# Patient Record
Sex: MALE | Race: WHITE | NOT HISPANIC OR LATINO
[De-identification: names, ages, dates, MRNs, and addresses within clinical notes are randomized per-mention and may not be internally consistent; named-entity substitution may affect disease eponyms.]

---

## 2022-09-03 ENCOUNTER — NON-APPOINTMENT (OUTPATIENT)
Age: 62
End: 2022-09-03

## 2022-09-04 ENCOUNTER — INPATIENT (INPATIENT)
Facility: HOSPITAL | Age: 62
LOS: 4 days | Discharge: HOME | End: 2022-09-09
Attending: INTERNAL MEDICINE | Admitting: INTERNAL MEDICINE

## 2022-09-04 VITALS
SYSTOLIC BLOOD PRESSURE: 168 MMHG | DIASTOLIC BLOOD PRESSURE: 112 MMHG | RESPIRATION RATE: 18 BRPM | TEMPERATURE: 100 F | OXYGEN SATURATION: 96 % | HEART RATE: 84 BPM

## 2022-09-04 DIAGNOSIS — I16.0 HYPERTENSIVE URGENCY: ICD-10-CM

## 2022-09-04 DIAGNOSIS — N17.9 ACUTE KIDNEY FAILURE, UNSPECIFIED: ICD-10-CM

## 2022-09-04 DIAGNOSIS — I10 ESSENTIAL (PRIMARY) HYPERTENSION: ICD-10-CM

## 2022-09-04 DIAGNOSIS — F41.9 ANXIETY DISORDER, UNSPECIFIED: ICD-10-CM

## 2022-09-04 DIAGNOSIS — U07.1 COVID-19: ICD-10-CM

## 2022-09-04 DIAGNOSIS — Z29.9 ENCOUNTER FOR PROPHYLACTIC MEASURES, UNSPECIFIED: ICD-10-CM

## 2022-09-04 LAB
ALBUMIN SERPL ELPH-MCNC: 4.1 G/DL — SIGNIFICANT CHANGE UP (ref 3.3–5)
ALP SERPL-CCNC: 80 U/L — SIGNIFICANT CHANGE UP (ref 40–120)
ALT FLD-CCNC: 22 U/L — SIGNIFICANT CHANGE UP (ref 4–41)
ANION GAP SERPL CALC-SCNC: 12 MMOL/L — SIGNIFICANT CHANGE UP (ref 7–14)
APTT BLD: 31.8 SEC — SIGNIFICANT CHANGE UP (ref 27–36.3)
AST SERPL-CCNC: 25 U/L — SIGNIFICANT CHANGE UP (ref 4–40)
BASE EXCESS BLDV CALC-SCNC: 1 MMOL/L — SIGNIFICANT CHANGE UP (ref -2–3)
BASOPHILS # BLD AUTO: 0.02 K/UL — SIGNIFICANT CHANGE UP (ref 0–0.2)
BASOPHILS NFR BLD AUTO: 0.4 % — SIGNIFICANT CHANGE UP (ref 0–2)
BILIRUB SERPL-MCNC: 0.5 MG/DL — SIGNIFICANT CHANGE UP (ref 0.2–1.2)
BLOOD GAS VENOUS COMPREHENSIVE RESULT: SIGNIFICANT CHANGE UP
BUN SERPL-MCNC: 15 MG/DL — SIGNIFICANT CHANGE UP (ref 7–23)
CALCIUM SERPL-MCNC: 8.7 MG/DL — SIGNIFICANT CHANGE UP (ref 8.4–10.5)
CHLORIDE BLDV-SCNC: 102 MMOL/L — SIGNIFICANT CHANGE UP (ref 96–108)
CHLORIDE SERPL-SCNC: 104 MMOL/L — SIGNIFICANT CHANGE UP (ref 98–107)
CO2 BLDV-SCNC: 28 MMOL/L — HIGH (ref 22–26)
CO2 SERPL-SCNC: 23 MMOL/L — SIGNIFICANT CHANGE UP (ref 22–31)
CREAT SERPL-MCNC: 1.43 MG/DL — HIGH (ref 0.5–1.3)
EGFR: 56 ML/MIN/1.73M2 — LOW
EOSINOPHIL # BLD AUTO: 0.01 K/UL — SIGNIFICANT CHANGE UP (ref 0–0.5)
EOSINOPHIL NFR BLD AUTO: 0.2 % — SIGNIFICANT CHANGE UP (ref 0–6)
FLUAV AG NPH QL: SIGNIFICANT CHANGE UP
FLUBV AG NPH QL: SIGNIFICANT CHANGE UP
GAS PNL BLDV: 134 MMOL/L — LOW (ref 136–145)
GLUCOSE BLDV-MCNC: 95 MG/DL — SIGNIFICANT CHANGE UP (ref 70–99)
GLUCOSE SERPL-MCNC: 94 MG/DL — SIGNIFICANT CHANGE UP (ref 70–99)
HCO3 BLDV-SCNC: 27 MMOL/L — SIGNIFICANT CHANGE UP (ref 22–29)
HCT VFR BLD CALC: 40.2 % — SIGNIFICANT CHANGE UP (ref 39–50)
HCT VFR BLDA CALC: 40 % — SIGNIFICANT CHANGE UP (ref 39–51)
HGB BLD CALC-MCNC: 13.2 G/DL — SIGNIFICANT CHANGE UP (ref 13–17)
HGB BLD-MCNC: 13.7 G/DL — SIGNIFICANT CHANGE UP (ref 13–17)
IANC: 3.84 K/UL — SIGNIFICANT CHANGE UP (ref 1.8–7.4)
IMM GRANULOCYTES NFR BLD AUTO: 0.4 % — SIGNIFICANT CHANGE UP (ref 0–1.5)
INR BLD: 1.13 RATIO — SIGNIFICANT CHANGE UP (ref 0.88–1.16)
LACTATE BLDV-MCNC: 1.4 MMOL/L — SIGNIFICANT CHANGE UP (ref 0.5–2)
LYMPHOCYTES # BLD AUTO: 0.8 K/UL — LOW (ref 1–3.3)
LYMPHOCYTES # BLD AUTO: 14.5 % — SIGNIFICANT CHANGE UP (ref 13–44)
MAGNESIUM SERPL-MCNC: 2.1 MG/DL — SIGNIFICANT CHANGE UP (ref 1.6–2.6)
MCHC RBC-ENTMCNC: 28.7 PG — SIGNIFICANT CHANGE UP (ref 27–34)
MCHC RBC-ENTMCNC: 34.1 GM/DL — SIGNIFICANT CHANGE UP (ref 32–36)
MCV RBC AUTO: 84.1 FL — SIGNIFICANT CHANGE UP (ref 80–100)
MONOCYTES # BLD AUTO: 0.81 K/UL — SIGNIFICANT CHANGE UP (ref 0–0.9)
MONOCYTES NFR BLD AUTO: 14.7 % — HIGH (ref 2–14)
NEUTROPHILS # BLD AUTO: 3.84 K/UL — SIGNIFICANT CHANGE UP (ref 1.8–7.4)
NEUTROPHILS NFR BLD AUTO: 69.8 % — SIGNIFICANT CHANGE UP (ref 43–77)
NRBC # BLD: 0 /100 WBCS — SIGNIFICANT CHANGE UP (ref 0–0)
NRBC # FLD: 0 K/UL — SIGNIFICANT CHANGE UP (ref 0–0)
NT-PROBNP SERPL-SCNC: 2540 PG/ML — HIGH
PCO2 BLDV: 45 MMHG — SIGNIFICANT CHANGE UP (ref 42–55)
PH BLDV: 7.38 — SIGNIFICANT CHANGE UP (ref 7.32–7.43)
PLATELET # BLD AUTO: 167 K/UL — SIGNIFICANT CHANGE UP (ref 150–400)
PO2 BLDV: 46 MMHG — SIGNIFICANT CHANGE UP
POTASSIUM BLDV-SCNC: 3.7 MMOL/L — SIGNIFICANT CHANGE UP (ref 3.5–5.1)
POTASSIUM SERPL-MCNC: 3.6 MMOL/L — SIGNIFICANT CHANGE UP (ref 3.5–5.3)
POTASSIUM SERPL-SCNC: 3.6 MMOL/L — SIGNIFICANT CHANGE UP (ref 3.5–5.3)
PROT SERPL-MCNC: 6.8 G/DL — SIGNIFICANT CHANGE UP (ref 6–8.3)
PROTHROM AB SERPL-ACNC: 13.1 SEC — SIGNIFICANT CHANGE UP (ref 10.5–13.4)
RBC # BLD: 4.78 M/UL — SIGNIFICANT CHANGE UP (ref 4.2–5.8)
RBC # FLD: 13.7 % — SIGNIFICANT CHANGE UP (ref 10.3–14.5)
RSV RNA NPH QL NAA+NON-PROBE: SIGNIFICANT CHANGE UP
SAO2 % BLDV: 80 % — SIGNIFICANT CHANGE UP
SARS-COV-2 RNA SPEC QL NAA+PROBE: DETECTED
SODIUM SERPL-SCNC: 139 MMOL/L — SIGNIFICANT CHANGE UP (ref 135–145)
TROPONIN T, HIGH SENSITIVITY RESULT: 20 NG/L — SIGNIFICANT CHANGE UP
TROPONIN T, HIGH SENSITIVITY RESULT: 21 NG/L — SIGNIFICANT CHANGE UP
WBC # BLD: 5.5 K/UL — SIGNIFICANT CHANGE UP (ref 3.8–10.5)
WBC # FLD AUTO: 5.5 K/UL — SIGNIFICANT CHANGE UP (ref 3.8–10.5)

## 2022-09-04 PROCEDURE — 71046 X-RAY EXAM CHEST 2 VIEWS: CPT | Mod: 26

## 2022-09-04 PROCEDURE — 71275 CT ANGIOGRAPHY CHEST: CPT | Mod: 26,MA

## 2022-09-04 PROCEDURE — 99285 EMERGENCY DEPT VISIT HI MDM: CPT

## 2022-09-04 PROCEDURE — 99223 1ST HOSP IP/OBS HIGH 75: CPT

## 2022-09-04 PROCEDURE — 74174 CTA ABD&PLVS W/CONTRAST: CPT | Mod: 26,MD

## 2022-09-04 RX ORDER — HYDRALAZINE HCL 50 MG
25 TABLET ORAL ONCE
Refills: 0 | Status: COMPLETED | OUTPATIENT
Start: 2022-09-04 | End: 2022-09-04

## 2022-09-04 RX ORDER — ACETAMINOPHEN 500 MG
650 TABLET ORAL EVERY 6 HOURS
Refills: 0 | Status: DISCONTINUED | OUTPATIENT
Start: 2022-09-04 | End: 2022-09-09

## 2022-09-04 RX ORDER — AMLODIPINE BESYLATE 2.5 MG/1
5 TABLET ORAL DAILY
Refills: 0 | Status: DISCONTINUED | OUTPATIENT
Start: 2022-09-05 | End: 2022-09-07

## 2022-09-04 RX ORDER — ALBUTEROL 90 UG/1
2 AEROSOL, METERED ORAL EVERY 4 HOURS
Refills: 0 | Status: DISCONTINUED | OUTPATIENT
Start: 2022-09-04 | End: 2022-09-09

## 2022-09-04 RX ORDER — HEPARIN SODIUM 5000 [USP'U]/ML
5000 INJECTION INTRAVENOUS; SUBCUTANEOUS EVERY 8 HOURS
Refills: 0 | Status: DISCONTINUED | OUTPATIENT
Start: 2022-09-04 | End: 2022-09-09

## 2022-09-04 RX ORDER — HYDRALAZINE HCL 50 MG
25 TABLET ORAL EVERY 8 HOURS
Refills: 0 | Status: DISCONTINUED | OUTPATIENT
Start: 2022-09-05 | End: 2022-09-06

## 2022-09-04 RX ORDER — ASPIRIN/CALCIUM CARB/MAGNESIUM 324 MG
324 TABLET ORAL ONCE
Refills: 0 | Status: COMPLETED | OUTPATIENT
Start: 2022-09-04 | End: 2022-09-04

## 2022-09-04 RX ORDER — AMLODIPINE BESYLATE 2.5 MG/1
5 TABLET ORAL ONCE
Refills: 0 | Status: COMPLETED | OUTPATIENT
Start: 2022-09-04 | End: 2022-09-04

## 2022-09-04 RX ADMIN — Medication 25 MILLIGRAM(S): at 23:31

## 2022-09-04 RX ADMIN — Medication 25 MILLIGRAM(S): at 18:51

## 2022-09-04 RX ADMIN — AMLODIPINE BESYLATE 5 MILLIGRAM(S): 2.5 TABLET ORAL at 18:51

## 2022-09-04 RX ADMIN — Medication 324 MILLIGRAM(S): at 18:51

## 2022-09-04 RX ADMIN — Medication 1 MILLIGRAM(S): at 17:21

## 2022-09-04 NOTE — ED ADULT TRIAGE NOTE - NS ED NURSE AMBULANCES
[facility]

## 2022-09-04 NOTE — ED ADULT NURSE NOTE - OBJECTIVE STATEMENT
Receive pt. in ER room 26 alert and oriented x 4, presenting to the ER with complaints of right shoulder and back of neck pain, accompanied by high blood pressure. Pt. stated " for one week the back of my neck and my right shoulder has been hurting me on and off, I went to [facility] and they told me my blood pressure is high, I have not taken my blood pressure medications in two years". No c/o chest pain, dizziness or blurry vision. Pt. verbalized feeling anxious, lonely , and depressed. Stated " my ex- wife does not allow me to see my son and it hurt me very much'. No suicidal or homicidal ideations. Place on cardiac monitor, medicated as ordered, labs sent. Call bell place within reach. Receive pt. in ER room 26 alert and oriented x 4, presenting to the ER with complaints of right shoulder and back of neck pain, accompanied by high blood pressure. Pt. stated " for one week the back of my neck and my right shoulder has been hurting me on and off, I went to [facility] and they told me my blood pressure is high, I have not taken my blood pressure medications in two years". No c/o chest pain, dizziness or blurry vision. Pt. verbalized feeling anxious, lonely , and depressed. Stated " my ex- wife does not allow me to see my son and it hurt me very much'. No suicidal or homicidal ideations. Place on cardiac monitor, medicated as ordered, labs sent. Call bell place within reach. Receive pt. in ER room 26 alert and oriented x 4, presenting to the ER with complaints of right shoulder and back of neck pain, accompanied by high blood pressure. Pt. stated " for one week the back of my neck and my right shoulder has been hurting me on and off, I went to [facility] and they told me my blood pressure is high, I have not taken my blood pressure medications in two years". No c/o chest pain, dizziness or blurry vision. Pt. verbalized feeling anxious, lonely , and depressed. Stated " my ex- wife does not allow me to see my son and it hurt me very much'. No suicidal or homicidal ideations. Place on cardiac monitor, medicated as ordered, labs sent. Call bell place within reach.

## 2022-09-04 NOTE — H&P ADULT - PROBLEM SELECTOR PLAN 1
urgency vs emergency, pt presented 190s/110s. Neck pain w/ radiation to L arm has resolved at this time. No chest pain reported  -Pt does have vague ekg tw changes, no prior to compare to, likely from longstanding untreated htn  -cards consulted, rec po hydralazine 25mg tid and norvasc 5mg daily for now, aiming for around 160s sysotlic for the next day  -hydral and amlodipine ordered with hold parameters <150 for now, will need adjustment as days go on.  -trops flat, CTA w/ no dissection. watch on tele. check echo

## 2022-09-04 NOTE — ED ADULT NURSE REASSESSMENT NOTE - NS ED NURSE REASSESS COMMENT FT1
pt received A&ox4, denies chest pain and SOB. no complaints of pain. denies headache, dizziness ,lightheadedness, radiating chest pain. breathing is spontaneous and unlabored. continuos  pulse ox monitoring in place. bed in lowest position, call bell within reach, siderails up x2. will continue to monitor.
Pt a&ox4, NSR on cardiac monitor, denies CP, SOB, or dyspnea at this time. Respirations are even and unlabored, no signs of respiratory distress.

## 2022-09-04 NOTE — ED PROVIDER NOTE - PHYSICAL EXAMINATION
LOS:     VITALS:   T(C): 37.6 (09-04-22 @ 15:43), Max: 37.6 (09-04-22 @ 15:43)  HR: 84 (09-04-22 @ 15:43) (84 - 84)  BP: 168/112 (09-04-22 @ 15:43) (168/112 - 168/112)  RR: 18 (09-04-22 @ 15:43) (18 - 18)  SpO2: 96% (09-04-22 @ 15:43) (96% - 96%)    GENERAL: NAD, lying in bed comfortably  HEAD:  Atraumatic, Normocephalic  EYES: EOMI, PERRLA, conjunctiva and sclera clear  ENT: Moist mucous membranes  NECK: Supple, No JVD  CHEST/LUNG: Clear to auscultation bilaterally; No rales, rhonchi, wheezing, or rubs. Unlabored respirations  HEART: Regular rate and rhythm; No murmurs, rubs, or gallops  ABDOMEN: BSx4; Soft, nontender, nondistended  EXTREMITIES:  2+ Peripheral Pulses, brisk capillary refill. No clubbing, cyanosis, or edema  NERVOUS SYSTEM:  A&Ox3, no focal deficits   SKIN: No rashes or lesions  Psych: Anxious

## 2022-09-04 NOTE — ED PROVIDER NOTE - CLINICAL SUMMARY MEDICAL DECISION MAKING FREE TEXT BOX
61yo non-compliant w/ BP medication p/w neck pain radiating to the R.arm found to have elevated BP. Plan to get cardiac labs to r/o ACS 63yo non-compliant w/ BP medication p/w neck pain radiating to the R.arm found to have elevated BP. Plan to get cardiac labs to r/o ACS

## 2022-09-04 NOTE — ED ADULT NURSE NOTE - NSIMPLEMENTINTERV_GEN_ALL_ED
Implemented All Universal Safety Interventions:  [place] to call system. Call bell, personal items and telephone within reach. Instruct patient to call for assistance. Room bathroom lighting operational. Non-slip footwear when patient is off stretcher. Physically safe environment: no spills, clutter or unnecessary equipment. Stretcher in lowest position, wheels locked, appropriate side rails in place. Implemented All Universal Safety Interventions:  [place] to call system. Call bell, personal items and telephone within reach. Instruct patient to call for assistance. Room bathroom lighting operational. Non-slip footwear when patient is off stretcher. Physically safe environment: no spills, clutter or unnecessary equipment. Stretcher in lowest position, wheels locked, appropriate side rails in place. Implemented All Universal Safety Interventions:  [place] to call system. Call bell, personal items and telephone within reach. Instruct patient to call for assistance. Room bathroom lighting operational. Non-slip footwear when patient is off stretcher. Physically safe environment: no spills, clutter or unnecessary equipment. Stretcher in lowest position, wheels locked, appropriate side rails in place.

## 2022-09-04 NOTE — ED ADULT NURSE NOTE - CHIEF COMPLAINT QUOTE
[facility] elevated [facility].  r shoulder,r arm pain x past wk. denies at present. denies specific injury. denies ch pains.  denies headache. reports " feels nervous ,depressed lately, I have anxiety but don't see a psychiatrist anymore because I cannot afford it ,I feel alone ,I do not want to kill myself or anyone else"

## 2022-09-04 NOTE — H&P ADULT - ASSESSMENT
61 y/o male with pmhx of HTN and anxiety (on no medications) who presents to the ER for elevated blood pressure in the setting of 5 days of feeling fatigue and vague neck pain radiating down his back and R arm. Found in the ER COVID positive and hypertensive, admitted for further workup.  63 y/o male with pmhx of HTN and anxiety (on no medications) who presents to the ER for elevated blood pressure in the setting of 5 days of feeling fatigue and vague neck pain radiating down his back and R arm. Found in the ER COVID positive and hypertensive, admitted for further workup.

## 2022-09-04 NOTE — H&P ADULT - NSHPLABSRESULTS_GEN_ALL_CORE
09-04    139  |  104  |  15  ----------------------------<  94  3.6   |  23  |  1.43<H>    Ca    8.7      04 Sep 2022 17:31  Mg     2.10     09-04    TPro  6.8  /  Alb  4.1  /  TBili  0.5  /  DBili  x   /  AST  25  /  ALT  22  /  AlkPhos  80  09-04                            13.7   5.50  )-----------( 167      ( 04 Sep 2022 17:31 )             40.2             LIVER FUNCTIONS - ( 04 Sep 2022 17:31 )  Alb: 4.1 g/dL / Pro: 6.8 g/dL / ALK PHOS: 80 U/L / ALT: 22 U/L / AST: 25 U/L / GGT: x             PT/INR - ( 04 Sep 2022 17:31 )   PT: 13.1 sec;   INR: 1.13 ratio         PTT - ( 04 Sep 2022 17:31 )  PTT:31.8 sec    EKG: sinus rhythm w/ PACs, nonspecific tw inversions T4-T6

## 2022-09-04 NOTE — H&P ADULT - HISTORY OF PRESENT ILLNESS
Pt is a 63 y/o male with pmhx of HTN and anxiety (on no medications) who presents to the ER for elevated blood pressure in the setting of 5 days of feeling fatigue and vague neck pain radiating down his back and R arm. HE reports that it might have started after he lifted a heavy trashbag. No weak or tingling sensation and no worsening on exertion. He denies any chest pain nor shortness of breath and denies any cardiac history. He does endorse mild cough and post nasal drip, otherwise no headache nor fever. No abd pain, diarrhea or dysuria. He has been having a good appetite. He does endorse feeling more anxious and slightly depressed recently due to being alone but denies any suicidal ideation. He reports he was on meds in the past but not recently. In the Er pt was found hypertensive and given amlodipine and hydralazine. Also given a dose of ativan for anxiety.

## 2022-09-04 NOTE — ED PROVIDER NOTE - PROGRESS NOTE DETAILS
[place]aney PGY-2: Pt BP stable at 160s/100s. Will plan to get CTA C/A/P to r/o aortic dissection. Will start patient on BP medication. Dissection study negative, patient clinically stable. Tele doc contacted w update - will admit to Dr. German. Cornelia Dias, ED Attending

## 2022-09-04 NOTE — H&P ADULT - PROBLEM SELECTOR PLAN 3
no SI. Dealing with issues at home and loneliness per pt. Continue ativan po prn for now. will need outpatient psych follow up vs inpatient eval if pt still admitted

## 2022-09-04 NOTE — ED ADULT TRIAGE NOTE - CHIEF COMPLAINT QUOTE
from [facility] elevated bp.  r shoulder,r arm pain x past wk. denies specific injury. denies ch pains.  denies headache. reports " feels nervous ,depressed lately, I have anxiety but don't see a psychiatrist anymore because I cannot afford it ,i feel alone ,i do not want to kill myself or anyone else" from [facility] elevated bp.  r shoulder,r arm pain x past wk. denies at present. denies specific injury. denies ch pains.  denies headache. reports " feels nervous ,depressed lately, I have anxiety but don't see a psychiatrist anymore because I cannot afford it ,I feel alone ,I do not want to kill myself or anyone else"

## 2022-09-04 NOTE — H&P ADULT - PROBLEM SELECTOR PLAN 2
possibly 2/2 long standing htn. No prior value to compare to. Check urine studies for now. monitor with blood pressure improvement. check i/o

## 2022-09-04 NOTE — ED PROVIDER NOTE - OBJECTIVE STATEMENT
63yo M w/ PMH of HTN and anxiety presents from [facility] for elevated blood pressure. Pt states that he started having neck pain that was radiating to down to the spine and to the right arm. He states that the pain was worse when he was sitting down in the car. Pt currently under a lot of stress and feeling very depressed and alone. States that he is feeling very fatigue for the past week. Pt states that he has not taken any BP medication or anxiety medication for over 2 years. Denies any chest pain, SOB, N/V, abdominal pain, headache, blurry vision, weakness, SI/HI. 63yo M w/ PMH of HTN and anxiety presents from [facility] for elevated blood pressure. Pt states that he started having neck pain that was radiating to down to the spine and to the right arm. He states that the pain was worse when he was sitting down in the car. Pt currently under a lot of stress and feeling very depressed and alone. States that he is feeling very fatigue for the past week. Pt states that he has not taken any BP medication or anxiety medication for over 2 years. Denies any chest pain, SOB, N/V, abdominal pain, headache, blurry vision, weakness, SI/HI. 63yo M w/ PMH of HTN and anxiety presents from [facility] for elevated blood pressure. Pt states that he started having neck pain that was radiating to down to the spine and to the right arm. He states that the pain was worse when he was sitting down in the car. Pt currently under a lot of stress and feeling very depressed and alone. States that he is feeling very fatigue for the past week. Pt states that he has not taken any BP medication or anxiety medication for over 2 years. Denies any chest pain, SOB, N/V, abdominal pain, headache, blurry vision, weakness, SI/HI.

## 2022-09-04 NOTE — H&P ADULT - NSHPREVIEWOFSYSTEMS_GEN_ALL_CORE
REVIEW OF SYSTEMS:    CONSTITUTIONAL: +generalieed weakness, no fevers or chills  EYES/ENT: +post nasal gtt. No visual changes;  No dysphagia  NECK: +mild neck pain, no stiffness  RESPIRATORY: No cough, wheezing, hemoptysis; No shortness of breath  CARDIOVASCULAR: No chest pain or palpitations; No lower extremity edema  GASTROINTESTINAL: No abdominal or epigastric pain. No nausea, vomiting, or hematemesis; No diarrhea or constipation. No melena or hematochezia.  GENITOURINARY: No dysuria, frequency or hematuria  NEUROLOGICAL: No numbness or weakness  PSYCH: +anxiety and depression  SKIN: No itching, burning, rashes, or lesions

## 2022-09-04 NOTE — CHART NOTE - NSCHARTNOTEFT_GEN_A_CORE
On call for [facility]/  Joey Bagley MD    case discussed w/ ED for telemetry doc of the day admission.  CC chest pain, radiating into neck and arm.    history of noncompliance / non-action with antihypertensives for ~2 yrs.  referred from urgent care for elevated bp... 190s/110s.  labs returned with borderline troponin x 1.  EKG reported as T-wave inversions in multiple leads.    Prior to accepting an admission, I've requested CTA for rule-out aortic dissection, which would be dangerous on the floor.  Also, start oral antihypertensives. i.e. Hydralazine 25mg TID, and amlodipine 5mg daily.  Goal BP for the next day or so is ~160mmHg systolic.  I've requested avoiding IV bolus antihypertensives to get his #s good enough for floor admission... if he requires a titratable infusion for BP control, he needs to go to the intensive care setting for ~12-24hrs to get medications loaded.  On admission, would get an echocardiogram, and trend troponin levels.    Will discuss outcome of studies and oral antihypertensive Rx with the ED later tonmarian, and we can discuss how to best admit the patient then.    Jean Jo M.D.  Cardiac Electrophysiology  [phone] On call for [facility]/  Joey Bagley MD    case discussed w/ ED for telemetry doc of the day admission.  CC chest pain, radiating into neck and arm.    history of noncompliance / non-action with antihypertensives for ~2 yrs.  referred from urgent care for elevated bp... 190s/110s.  labs returned with borderline troponin x 1.  EKG reported as T-wave inversions in multiple leads.    Prior to accepting an admission, I've requested CTA for rule-out aortic dissection, which would be dangerous on the floor.  Also, start oral antihypertensives. i.e. Hydralazine 25mg TID, and amlodipine 5mg daily.  Goal BP for the next day or so is ~160mmHg systolic.  I've requested avoiding IV bolus antihypertensives to get his #s good enough for floor admission... if he requires a titratable infusion for BP control, he needs to go to the intensive care setting for ~12-24hrs to get medications loaded.  On admission, would get an echocardiogram, and trend troponin levels.    Will discuss outcome of studies and oral antihypertensive Rx with the ED later tonmarian, and we can discuss how to best admit the patient then.    Jean Jo M.D.  Cardiac Electrophysiology  [phone] On call for [facility]/  Joey Bagley MD    case discussed w/ ED for telemetry doc of the day admission.  CC chest pain, radiating into neck and arm.    history of noncompliance / non-action with antihypertensives for ~2 yrs.  referred from urgent care for elevated bp... 190s/110s.  labs returned with borderline troponin x 1.  EKG reported as T-wave inversions in multiple leads.    Prior to accepting an admission, I've requested CTA for rule-out aortic dissection, which would be dangerous on the floor.  Also, start oral antihypertensives. i.e. Hydralazine 25mg TID, and amlodipine 5mg daily.  Goal BP for the next day or so is ~160mmHg systolic.  I've requested avoiding IV bolus antihypertensives to get his #s good enough for floor admission... if he requires a titratable infusion for BP control, he needs to go to the intensive care setting for ~12-24hrs to get medications loaded.  On admission, would get an echocardiogram, and trend troponin levels.    Will discuss outcome of studies and oral antihypertensive Rx with the ED later tonmarian, and we can discuss how to best admit the patient then.    Jean Jo M.D.  Cardiac Electrophysiology  [phone]

## 2022-09-04 NOTE — ED PROVIDER NOTE - ATTENDING APP SHARED VISIT CONTRIBUTION OF CARE
I have personally performed a face to face medical and diagnostic evaluation of the patient. I have discussed with and reviewed the ACP's note and agree with the History, ROS, Physical Exam and MDM unless otherwise indicated. A brief summary of my personal evaluation and impression can be found below.     63yo M w/ PMH of HTN and anxiety presents from [facility] for elevated blood pressure. Pt states that he started having neck pain that was radiating to down to the spine and to the right arm - pain is worse when he is in a seated . He states that the pain was worse when he was sitting down in the car. Pt currently under a lot of stress and feeling very depressed and alone. States that he is feeling very fatigue for the past week. Pt states that he has not taken any BP medication or anxiety medication for over 2 years. Denies any chest pain, SOB, N/V, abdominal pain, headache, blurry vision, weakness, SI/HI. I have personally performed a face to face medical and diagnostic evaluation of the patient. I have discussed with and reviewed the ACP's note and agree with the History, ROS, Physical Exam and MDM unless otherwise indicated. A brief summary of my personal evaluation and impression can be found below.     61yo M w/ PMH of HTN and anxiety presents from [facility] for elevated blood pressure. Pt states that he started having neck pain that was radiating to down to the spine and to the right arm - pain is worse when he is in a seated . He states that the pain was worse when he was sitting down in the car. Pt currently under a lot of stress and feeling very depressed and alone. States that he is feeling very fatigue for the past week. Pt states that he has not taken any BP medication or anxiety medication for over 2 years. Denies any chest pain, SOB, N/V, abdominal pain, headache, blurry vision, weakness, SI/HI. I have personally performed a face to face medical and diagnostic evaluation of the patient. I have discussed with and reviewed the ACP's note and agree with the History, ROS, Physical Exam and MDM unless otherwise indicated. A brief summary of my personal evaluation and impression can be found below.     61yo M w/ PMH of HTN and anxiety presents from [facility] for elevated blood pressure. Pt states that he started having neck pain that was radiating to down to the spine and to the right arm - pain is worse when he is in a seated . He states that the pain was worse when he was sitting down in the car. Pt currently under a lot of stress and feeling very depressed and alone. States that he is feeling very fatigue for the past week. Pt states that he has not taken any BP medication or anxiety medication for over 2 years. Denies any chest pain, SOB, N/V, abdominal pain, headache, blurry vision, weakness, SI/HI. I have personally performed a face to face medical and diagnostic evaluation of the patient. I have discussed with and reviewed the ACP's note and agree with the History, ROS, Physical Exam and MDM unless otherwise indicated. A brief summary of my personal evaluation and impression can be found below.     61yo M w/ PMH of HTN and anxiety presents from [facility] for elevated blood pressure. Pt states that he started having neck pain that was radiating to down to the spine and to the right arm - pain is worse when he is in a seated. Pt currently under a lot of stress and feeling very depressed and alone - but denies SI/HI. States that he is feeling very fatigue for the past week. Pt states that he has not taken any BP medication or anxiety medication for over 2 years. Denies any chest pain, SOB, N/V, abdominal pain, headache, blurry vision, weakness.     On exam, VSS w clear lungs, no LE edema. EKG w non specific ekg changes - t wave inversions in the precordial leads. Given fatigue, uncontrolled long standing HTN, and ekg changes, will do ACS work up. Pt endorses anxiety - will trial PO ativan to help with symptoms. Will need tele admission for completion of work. Cornelia Dias, ED Attending I have personally performed a face to face medical and diagnostic evaluation of the patient. I have discussed with and reviewed the ACP's note and agree with the History, ROS, Physical Exam and MDM unless otherwise indicated. A brief summary of my personal evaluation and impression can be found below.     61yo M w/ PMH of HTN and anxiety presents from [facility] for elevated blood pressure. Pt states that he started having neck pain that was radiating to down to the spine and to the right arm - pain is worse when he is in a seated. Pt currently under a lot of stress and feeling very depressed and alone - but denies SI/HI. States that he is feeling very fatigue for the past week. Pt states that he has not taken any BP medication or anxiety medication for over 2 years. Denies any chest pain, SOB, N/V, abdominal pain, headache, blurry vision, weakness.     On exam, VSS w clear lungs, no LE edema. EKG w non specific ekg changes - t wave inversions in the precordial leads. Given fatigue, uncontrolled long standing HTN, and ekg changes, will do ACS work up. Pt endorses anxiety - will trial PO ativan to help with symptoms. Will need tele admission for completion of work. Cornelia Dias, ED Attending I have personally performed a face to face medical and diagnostic evaluation of the patient. I have discussed with and reviewed the ACP's note and agree with the History, ROS, Physical Exam and MDM unless otherwise indicated. A brief summary of my personal evaluation and impression can be found below.     61yo M w/ PMH of HTN and anxiety presents from [facility] for elevated blood pressure. Pt states that he started having neck pain that was radiating to down to the spine and to the right arm - pain is worse when he is in a seated. Pt currently under a lot of stress and feeling very depressed and alone - but denies SI/HI. States that he is feeling very fatigue for the past week. Pt states that he has not taken any BP medication or anxiety medication for over 2 years. Denies any chest pain, SOB, N/V, abdominal pain, headache, blurry vision, weakness.     On exam, VSS w clear lungs, no LE edema. EKG w non specific ekg changes - t wave inversions in the precordial leads. Given fatigue, uncontrolled long standing HTN, and ekg changes, will do ACS work up. Pt endorses anxiety - will trial PO ativan to help with symptoms. Will need tele admission for completion of work. Cornelia Dias, ED Attending

## 2022-09-04 NOTE — H&P ADULT - NSHPPHYSICALEXAM_GEN_ALL_CORE
PHYSICAL EXAM:  GENERAL: NAD, well-developed, well-nourished  HEAD:  Atraumatic, Normocephalic  EYES: EOMI, PERRLA, conjunctiva and sclera clear  NECK: Supple, nontender to palpation, +full ROM  CHEST/LUNG: Clear to auscultation bilaterally; No wheezes, rales or rhonchi  HEART: Regular rate and rhythm; No murmurs, rubs, or gallops, (+)S1, S2  ABDOMEN: Soft, Nontender, Nondistended; Normal Bowel sounds   EXTREMITIES:  2+ Peripheral Pulses, No clubbing, cyanosis, or edema  PSYCH: normal mood and affect  NEUROLOGY: AAOx3, non-focal  SKIN: No rashes or lesions

## 2022-09-05 PROCEDURE — 93306 TTE W/DOPPLER COMPLETE: CPT | Mod: 26

## 2022-09-05 RX ORDER — HYDRALAZINE HCL 50 MG
2.5 TABLET ORAL ONCE
Refills: 0 | Status: COMPLETED | OUTPATIENT
Start: 2022-09-05 | End: 2022-09-05

## 2022-09-05 RX ORDER — GUAIFENESIN/DEXTROMETHORPHAN 600MG-30MG
5 TABLET, EXTENDED RELEASE 12 HR ORAL ONCE
Refills: 0 | Status: COMPLETED | OUTPATIENT
Start: 2022-09-05 | End: 2022-09-05

## 2022-09-05 RX ADMIN — HEPARIN SODIUM 5000 UNIT(S): 5000 INJECTION INTRAVENOUS; SUBCUTANEOUS at 21:29

## 2022-09-05 RX ADMIN — Medication 25 MILLIGRAM(S): at 21:29

## 2022-09-05 RX ADMIN — Medication 25 MILLIGRAM(S): at 14:52

## 2022-09-05 RX ADMIN — HEPARIN SODIUM 5000 UNIT(S): 5000 INJECTION INTRAVENOUS; SUBCUTANEOUS at 14:52

## 2022-09-05 RX ADMIN — Medication 2.5 MILLIGRAM(S): at 01:27

## 2022-09-05 RX ADMIN — Medication 5 MILLILITER(S): at 01:55

## 2022-09-05 RX ADMIN — HEPARIN SODIUM 5000 UNIT(S): 5000 INJECTION INTRAVENOUS; SUBCUTANEOUS at 04:08

## 2022-09-05 RX ADMIN — Medication 0.5 MILLIGRAM(S): at 04:07

## 2022-09-05 RX ADMIN — AMLODIPINE BESYLATE 5 MILLIGRAM(S): 2.5 TABLET ORAL at 04:07

## 2022-09-05 RX ADMIN — Medication 25 MILLIGRAM(S): at 04:07

## 2022-09-05 NOTE — CONSULT NOTE ADULT - SUBJECTIVE AND OBJECTIVE BOX
date of consult 9/5/22    HISTORY OF PRESENT ILLNESS: HPI:  Pt is a 63 y/o male with pmhx of HTN and anxiety (on no medications) who presents to the ER for elevated blood pressure in the setting of 5 days of feeling fatigue and neck pain radiating down his back and R arm. HE reports that it might have started after he lifted a heavy trashbag. No weak or tingling sensation and no worsening on exertion. He denies any chest pain nor shortness of breath and denies any cardiac history. He does endorse mild cough and post nasal drip, otherwise no headache nor fever. No abd pain, diarrhea or dysuria. He has been having a good appetite. He does endorse feeling more anxious and slightly depressed recently due to being alone but denies any suicidal ideation. He reports he was on meds in the past but not recently. In the Er pt was found hypertensive and given amlodipine and hydralazine. Also given a dose of ativan for anxiety.  (04 Sep 2022 22:29)      PAST MEDICAL & SURGICAL HISTORY:  Hypertension  Anxiety  No significant past surgical history      MEDICATIONS  (STANDING):  amLODIPine   Tablet 5 milliGRAM(s) Oral daily  heparin   Injectable 5000 Unit(s) SubCutaneous every 8 hours  hydrALAZINE 25 milliGRAM(s) Oral every 8 hours      Allergies  No Known Allergies      FAMILY HISTORY:  FHx: coronary artery disease  Non-contributary for premature coronary disease or sudden cardiac death    SOCIAL HISTORY:    [x ] Non-smoker  [ ] Smoker  [ ] Alcohol    FLU VACCINE THIS YEAR STARTS IN AUGUST:  [ ] Yes    [ ] No    IF OVER 65 HAVE YOU EVER HAD A PNA VACCINE:  [ ] Yes    [ ] No       [ ] N/A      REVIEW OF SYSTEMS:  [x ]chest pain  [  ]shortness of breath  [  ]palpitations  [  ]syncope  [ ]near syncope [ ]upper extremity weakness   [ ] lower extremity weakness  [  ]diplopia  [  ]altered mental status   [  ]fevers  [ ]chills [ ]nausea  [ ]vomiting  [  ]dysphagia    [ ]abdominal pain  [ ]melena  [ ]BRBPR    [  ]epistaxis  [  ]rash    [ ]lower extremity edema        [x ] All others negative	  [ ] Unable to obtain      LABS:	 	    CARDIAC MARKERS:    TROP T 21, 20                          13.7   5.50  )-----------( 167      ( 04 Sep 2022 17:31 )             40.2     139  |  104  |  15  ----------------------------<  94  3.6   |  23  |  1.43<H>    Ca    8.7      04 Sep 2022 17:31  Mg     2.10     09-04    TPro  6.8  /  Alb  4.1  /  TBili  0.5  /  DBili  x   /  AST  25  /  ALT  22  /  AlkPhos  80  09-04    proBNP: Serum Pro-Brain Natriuretic Peptide: 2540 pg/mL (09-04 @ 17:31)    PHYSICAL EXAM:  T(C): 37.1 (09-05-22 @ 09:30), Max: 37.6 (09-04-22 @ 15:43)  HR: 88 (09-05-22 @ 09:30) (70 - 88)  BP: 161/92 (09-05-22 @ 09:30) (161/92 - 195/91)  RR: 17 (09-05-22 @ 09:30) (16 - 19)  SpO2: 99% (09-05-22 @ 09:30) (96% - 100%)  Wt(kg): --   BMI (kg/m2): 31.8 (09-05-22 @ 04:09)  I&O's Summary      Gen: Appears well in NAD  HEENT:  (-)icterus (-)pallor  CV: N S1 S2 1/6 DONALDO (+)2 Pulses B/l  Resp:  Clear to ausculatation B/L, normal effort  GI: (+) BS Soft, NT, ND  Lymph:  (-)Edema, (-)obvious lymphadenopathy  Skin: Warm to touch, Normal turgor  Psych: Appropriate mood and affect      TELEMETRY: SR	      ECG:  	NSR  NS anterolateral TWI PACs    < from: CT Angio Chest Aorta w/wo IV Cont (09.04.22 @ 20:02) >    IMPRESSION:    No aortic dissection.  Hepatomegaly. Hepatic steatosis.    < end of copied text >      ASSESSMENT/PLAN: 	Pt is a 63 y/o male with pmhx of HTN and anxiety (on no medications) who presents to the ER for elevated blood pressure in the setting of 5 days of feeling fatigued, fevers/chills and neck pain radiating down his back and R arm.    --Pt is COVID +  --CTA chest ruled out aortic dissection  --upon exam and interview, pain in R shoulder is reproducible and worse when laying on that side, likely musculoskeletal  --check TTE  --HTN slowly improving, slowly uptitrate hydralazine as tolerated  --medicine consult to manage COVID symptoms- Dr Piper called  --eventual ischemic eval in the office once covid resolved

## 2022-09-05 NOTE — CONSULT NOTE ADULT - SUBJECTIVE AND OBJECTIVE BOX
EP Attending    HISTORY OF PRESENT ILLNESS: HPI:  Pt is a 63 y/o male with pmhx of HTN and anxiety (on no medications) who presents to the ER for elevated blood pressure in the setting of 5 days of feeling fatigue and neck pain radiating down his back and R arm. HE reports that it might have started after he lifted a heavy trashbag. No weak or tingling sensation and no worsening on exertion. He denies any chest pain nor shortness of breath and denies any cardiac history. He does endorse mild cough and post nasal drip, otherwise no headache nor fever. No abd pain, diarrhea or dysuria. He has been having a good appetite. He does endorse feeling more anxious and slightly depressed recently due to being alone but denies any suicidal ideation. He reports he was on meds in the past but not recently. In the Er pt was found hypertensive and given amlodipine and hydralazine. Also given a dose of ativan for anxiety.  (04 Sep 2022 22:29)    Date of service 9/5-  patient has chronic anxiety and tends to avoid medical care. has palpitations on a near-daily basis, but no fainting.  has nonspecific chest discomfort radiating into his arms and neck, but is not sure if this is angina, since it can happen at rest.  no prior known MI or stroke.  no prior known vascular disease.  he presented in hypertensive urgency vs emergency, /110s with chest/neck discomfort.  No dissection on CTA.  sinus rhythm on telemetry. He is essentially a new start to oral antihypertensives after lapsed care for a few years.    A 10 pt ROS is otherwise negative.    PAST MEDICAL & SURGICAL HISTORY:  Hypertension  Anxiety  No significant past surgical history    MEDICATIONS  (STANDING):  amLODIPine   Tablet 5 milliGRAM(s) Oral daily  heparin   Injectable 5000 Unit(s) SubCutaneous every 8 hours  hydrALAZINE 25 milliGRAM(s) Oral every 8 hours    Allergies  No Known Allergies    FAMILY HISTORY:  FHx: coronary artery disease  Non-contributary for premature coronary disease or sudden cardiac death    SOCIAL HISTORY:    [x ] Non-smoker  [ ] Smoker  [ ] Alcohol    FLU VACCINE THIS YEAR STARTS IN AUGUST:  [ ] Yes    [ ] No    IF OVER 65 HAVE YOU EVER HAD A PNA VACCINE:  [ ] Yes    [ ] No       [ ] N/A    REVIEW OF SYSTEMS:  [x ]chest pain  [  ]shortness of breath  [  ]palpitations  [  ]syncope  [ ]near syncope [ ]upper extremity weakness   [ ] lower extremity weakness  [  ]diplopia  [  ]altered mental status   [  ]fevers  [ ]chills [ ]nausea  [ ]vomiting  [  ]dysphagia    [ ]abdominal pain  [ ]melena  [ ]BRBPR    [  ]epistaxis  [  ]rash    [ ]lower extremity edema        [x ] All others negative	  [ ] Unable to obtain      LABS:	 	    CARDIAC MARKERS:    TROP T 21, 20                          13.7   5.50  )-----------( 167      ( 04 Sep 2022 17:31 )             40.2     139  |  104  |  15  ----------------------------<  94  3.6   |  23  |  1.43<H>    Ca    8.7      04 Sep 2022 17:31  Mg     2.10     09-04    TPro  6.8  /  Alb  4.1  /  TBili  0.5  /  DBili  x   /  AST  25  /  ALT  22  /  AlkPhos  80  09-04    proBNP: Serum Pro-Brain Natriuretic Peptide: 2540 pg/mL (09-04 @ 17:31)    PHYSICAL EXAM:  T(C): 37.1 (09-05-22 @ 09:30), Max: 37.6 (09-04-22 @ 15:43)  HR: 88 (09-05-22 @ 09:30) (70 - 88)  BP: 161/92 (09-05-22 @ 09:30) (161/92 - 195/91)  RR: 17 (09-05-22 @ 09:30) (16 - 19)  SpO2: 99% (09-05-22 @ 09:30) (96% - 100%)  Wt(kg): --   BMI (kg/m2): 31.8 (09-05-22 @ 04:09)  I&O's Summary    General: Well nourished, no acute distress, alert and oriented x 3  Head: normocephalic, no trauma  Neck: no JVD, no bruit, supple, not enlarged  CV: S1S2, no S3, regular rate, rhythm is SINUS, no murmurs.    Lungs: clear BL, no rales or wheezes  Abdomen: bowel sounds +, soft, nontender, nondistended  Extremities: no clubbing, cyanosis or edema  Neuro: Moves all 4 extremities, sensation intact x 4 extremities  Skin: warm and moist, normal turgor  Psych: Mood and affect are appropriate for circumstances  MSK: normal range of motion and strength x4 extremities.      TELEMETRY: NSR, no AFib.    ECG:  	NSR , anterior and lateral T wave inversions, PAc's.    < from: CT Angio Chest Aorta w/wo IV Cont (09.04.22 @ 20:02) >    IMPRESSION:    No aortic dissection.  Hepatomegaly. Hepatic steatosis.    < end of copied text >      ASSESSMENT/PLAN: 	Pt is a 63 y/o male here with shortness of breath, fatigue, atypical chest pain.  Found to have COVID.  Lapsed care for hypertension.  Palpitations.  Anxiety disorder?    --Expected pulmonary and systemic symptoms from COVID  --Echo pending  --Started oral vasodilators yesterday.  Tomorrow, has room to add more medications, realistically to target a SBP of ~130-140 before discharge, as this is a chronic issue.  --Telemetry monitoring for AFib or SVT.  Would defer invasive management of any arrhythmia until after cleared from COVID isolation.  --Maintain K 4-4.5, Mg 2 via oral route.  --If no arrhythmia identified in hospital, will recommend ambulatory event monitoring.    Jean Jo M.D.  Cardiac Electrophysiology  [phone]     EP Attending    HISTORY OF PRESENT ILLNESS: HPI:  Pt is a 61 y/o male with pmhx of HTN and anxiety (on no medications) who presents to the ER for elevated blood pressure in the setting of 5 days of feeling fatigue and neck pain radiating down his back and R arm. HE reports that it might have started after he lifted a heavy trashbag. No weak or tingling sensation and no worsening on exertion. He denies any chest pain nor shortness of breath and denies any cardiac history. He does endorse mild cough and post nasal drip, otherwise no headache nor fever. No abd pain, diarrhea or dysuria. He has been having a good appetite. He does endorse feeling more anxious and slightly depressed recently due to being alone but denies any suicidal ideation. He reports he was on meds in the past but not recently. In the Er pt was found hypertensive and given amlodipine and hydralazine. Also given a dose of ativan for anxiety.  (04 Sep 2022 22:29)    Date of service 9/5-  patient has chronic anxiety and tends to avoid medical care. has palpitations on a near-daily basis, but no fainting.  has nonspecific chest discomfort radiating into his arms and neck, but is not sure if this is angina, since it can happen at rest.  no prior known MI or stroke.  no prior known vascular disease.  he presented in hypertensive urgency vs emergency, /110s with chest/neck discomfort.  No dissection on CTA.  sinus rhythm on telemetry. He is essentially a new start to oral antihypertensives after lapsed care for a few years.    A 10 pt ROS is otherwise negative.    PAST MEDICAL & SURGICAL HISTORY:  Hypertension  Anxiety  No significant past surgical history    MEDICATIONS  (STANDING):  amLODIPine   Tablet 5 milliGRAM(s) Oral daily  heparin   Injectable 5000 Unit(s) SubCutaneous every 8 hours  hydrALAZINE 25 milliGRAM(s) Oral every 8 hours    Allergies  No Known Allergies    FAMILY HISTORY:  FHx: coronary artery disease  Non-contributary for premature coronary disease or sudden cardiac death    SOCIAL HISTORY:    [x ] Non-smoker  [ ] Smoker  [ ] Alcohol    FLU VACCINE THIS YEAR STARTS IN AUGUST:  [ ] Yes    [ ] No    IF OVER 65 HAVE YOU EVER HAD A PNA VACCINE:  [ ] Yes    [ ] No       [ ] N/A    REVIEW OF SYSTEMS:  [x ]chest pain  [  ]shortness of breath  [  ]palpitations  [  ]syncope  [ ]near syncope [ ]upper extremity weakness   [ ] lower extremity weakness  [  ]diplopia  [  ]altered mental status   [  ]fevers  [ ]chills [ ]nausea  [ ]vomiting  [  ]dysphagia    [ ]abdominal pain  [ ]melena  [ ]BRBPR    [  ]epistaxis  [  ]rash    [ ]lower extremity edema        [x ] All others negative	  [ ] Unable to obtain      LABS:	 	    CARDIAC MARKERS:    TROP T 21, 20                          13.7   5.50  )-----------( 167      ( 04 Sep 2022 17:31 )             40.2     139  |  104  |  15  ----------------------------<  94  3.6   |  23  |  1.43<H>    Ca    8.7      04 Sep 2022 17:31  Mg     2.10     09-04    TPro  6.8  /  Alb  4.1  /  TBili  0.5  /  DBili  x   /  AST  25  /  ALT  22  /  AlkPhos  80  09-04    proBNP: Serum Pro-Brain Natriuretic Peptide: 2540 pg/mL (09-04 @ 17:31)    PHYSICAL EXAM:  T(C): 37.1 (09-05-22 @ 09:30), Max: 37.6 (09-04-22 @ 15:43)  HR: 88 (09-05-22 @ 09:30) (70 - 88)  BP: 161/92 (09-05-22 @ 09:30) (161/92 - 195/91)  RR: 17 (09-05-22 @ 09:30) (16 - 19)  SpO2: 99% (09-05-22 @ 09:30) (96% - 100%)  Wt(kg): --   BMI (kg/m2): 31.8 (09-05-22 @ 04:09)  I&O's Summary    General: Well nourished, no acute distress, alert and oriented x 3  Head: normocephalic, no trauma  Neck: no JVD, no bruit, supple, not enlarged  CV: S1S2, no S3, regular rate, rhythm is SINUS, no murmurs.    Lungs: clear BL, no rales or wheezes  Abdomen: bowel sounds +, soft, nontender, nondistended  Extremities: no clubbing, cyanosis or edema  Neuro: Moves all 4 extremities, sensation intact x 4 extremities  Skin: warm and moist, normal turgor  Psych: Mood and affect are appropriate for circumstances  MSK: normal range of motion and strength x4 extremities.      TELEMETRY: NSR, no AFib.    ECG:  	NSR , anterior and lateral T wave inversions, PAc's.    < from: CT Angio Chest Aorta w/wo IV Cont (09.04.22 @ 20:02) >    IMPRESSION:    No aortic dissection.  Hepatomegaly. Hepatic steatosis.    < end of copied text >      ASSESSMENT/PLAN: 	Pt is a 61 y/o male here with shortness of breath, fatigue, atypical chest pain.  Found to have COVID.  Lapsed care for hypertension.  Palpitations.  Anxiety disorder?    --Expected pulmonary and systemic symptoms from COVID  --Echo pending  --Started oral vasodilators yesterday.  Tomorrow, has room to add more medications, realistically to target a SBP of ~130-140 before discharge, as this is a chronic issue.  --Telemetry monitoring for AFib or SVT.  Would defer invasive management of any arrhythmia until after cleared from COVID isolation.  --Maintain K 4-4.5, Mg 2 via oral route.  --If no arrhythmia identified in hospital, will recommend ambulatory event monitoring.    Jean Jo M.D.  Cardiac Electrophysiology  [phone]     EP Attending    HISTORY OF PRESENT ILLNESS: HPI:  Pt is a 61 y/o male with pmhx of HTN and anxiety (on no medications) who presents to the ER for elevated blood pressure in the setting of 5 days of feeling fatigue and neck pain radiating down his back and R arm. HE reports that it might have started after he lifted a heavy trashbag. No weak or tingling sensation and no worsening on exertion. He denies any chest pain nor shortness of breath and denies any cardiac history. He does endorse mild cough and post nasal drip, otherwise no headache nor fever. No abd pain, diarrhea or dysuria. He has been having a good appetite. He does endorse feeling more anxious and slightly depressed recently due to being alone but denies any suicidal ideation. He reports he was on meds in the past but not recently. In the Er pt was found hypertensive and given amlodipine and hydralazine. Also given a dose of ativan for anxiety.  (04 Sep 2022 22:29)    Date of service 9/5-  patient has chronic anxiety and tends to avoid medical care. has palpitations on a near-daily basis, but no fainting.  has nonspecific chest discomfort radiating into his arms and neck, but is not sure if this is angina, since it can happen at rest.  no prior known MI or stroke.  no prior known vascular disease.  he presented in hypertensive urgency vs emergency, /110s with chest/neck discomfort.  No dissection on CTA.  sinus rhythm on telemetry. He is essentially a new start to oral antihypertensives after lapsed care for a few years.    A 10 pt ROS is otherwise negative.    PAST MEDICAL & SURGICAL HISTORY:  Hypertension  Anxiety  No significant past surgical history    MEDICATIONS  (STANDING):  amLODIPine   Tablet 5 milliGRAM(s) Oral daily  heparin   Injectable 5000 Unit(s) SubCutaneous every 8 hours  hydrALAZINE 25 milliGRAM(s) Oral every 8 hours    Allergies  No Known Allergies    FAMILY HISTORY:  FHx: coronary artery disease  Non-contributary for premature coronary disease or sudden cardiac death    SOCIAL HISTORY:    [x ] Non-smoker  [ ] Smoker  [ ] Alcohol    FLU VACCINE THIS YEAR STARTS IN AUGUST:  [ ] Yes    [ ] No    IF OVER 65 HAVE YOU EVER HAD A PNA VACCINE:  [ ] Yes    [ ] No       [ ] N/A    REVIEW OF SYSTEMS:  [x ]chest pain  [  ]shortness of breath  [  ]palpitations  [  ]syncope  [ ]near syncope [ ]upper extremity weakness   [ ] lower extremity weakness  [  ]diplopia  [  ]altered mental status   [  ]fevers  [ ]chills [ ]nausea  [ ]vomiting  [  ]dysphagia    [ ]abdominal pain  [ ]melena  [ ]BRBPR    [  ]epistaxis  [  ]rash    [ ]lower extremity edema        [x ] All others negative	  [ ] Unable to obtain      LABS:	 	    CARDIAC MARKERS:    TROP T 21, 20                          13.7   5.50  )-----------( 167      ( 04 Sep 2022 17:31 )             40.2     139  |  104  |  15  ----------------------------<  94  3.6   |  23  |  1.43<H>    Ca    8.7      04 Sep 2022 17:31  Mg     2.10     09-04    TPro  6.8  /  Alb  4.1  /  TBili  0.5  /  DBili  x   /  AST  25  /  ALT  22  /  AlkPhos  80  09-04    proBNP: Serum Pro-Brain Natriuretic Peptide: 2540 pg/mL (09-04 @ 17:31)    PHYSICAL EXAM:  T(C): 37.1 (09-05-22 @ 09:30), Max: 37.6 (09-04-22 @ 15:43)  HR: 88 (09-05-22 @ 09:30) (70 - 88)  BP: 161/92 (09-05-22 @ 09:30) (161/92 - 195/91)  RR: 17 (09-05-22 @ 09:30) (16 - 19)  SpO2: 99% (09-05-22 @ 09:30) (96% - 100%)  Wt(kg): --   BMI (kg/m2): 31.8 (09-05-22 @ 04:09)  I&O's Summary    General: Well nourished, no acute distress, alert and oriented x 3  Head: normocephalic, no trauma  Neck: no JVD, no bruit, supple, not enlarged  CV: S1S2, no S3, regular rate, rhythm is SINUS, no murmurs.    Lungs: clear BL, no rales or wheezes  Abdomen: bowel sounds +, soft, nontender, nondistended  Extremities: no clubbing, cyanosis or edema  Neuro: Moves all 4 extremities, sensation intact x 4 extremities  Skin: warm and moist, normal turgor  Psych: Mood and affect are appropriate for circumstances  MSK: normal range of motion and strength x4 extremities.      TELEMETRY: NSR, no AFib.    ECG:  	NSR , anterior and lateral T wave inversions, PAc's.    < from: CT Angio Chest Aorta w/wo IV Cont (09.04.22 @ 20:02) >    IMPRESSION:    No aortic dissection.  Hepatomegaly. Hepatic steatosis.    < end of copied text >      ASSESSMENT/PLAN: 	Pt is a 61 y/o male here with shortness of breath, fatigue, atypical chest pain.  Found to have COVID.  Lapsed care for hypertension.  Palpitations.  Anxiety disorder?    --Expected pulmonary and systemic symptoms from COVID  --Echo pending  --Started oral vasodilators yesterday.  Tomorrow, has room to add more medications, realistically to target a SBP of ~130-140 before discharge, as this is a chronic issue.  --Telemetry monitoring for AFib or SVT.  Would defer invasive management of any arrhythmia until after cleared from COVID isolation.  --Maintain K 4-4.5, Mg 2 via oral route.  --If no arrhythmia identified in hospital, will recommend ambulatory event monitoring.    Jean Jo M.D.  Cardiac Electrophysiology  [phone]

## 2022-09-05 NOTE — PATIENT PROFILE ADULT - FALL HARM RISK - HARM RISK INTERVENTIONS
Assistance with ambulation/Assistance OOB with selected safe patient handling equipment/Communicate Risk of Fall with Harm to all staff/Reinforce activity limits and safety measures with patient and family/Tailored Fall Risk Interventions/Visual Cue: Yellow wristband and red socks/Bed in lowest position, wheels locked, appropriate side rails in place/Call bell, personal items and telephone in reach/Instruct patient to call for assistance before getting out of bed or chair/Non-slip footwear when patient is out of bed/[place] to call system/Physically safe environment - no spills, clutter or unnecessary equipment/Purposeful Proactive Rounding/Room/bathroom lighting operational, light cord in reach Assistance with ambulation/Assistance OOB with selected safe patient handling equipment/Communicate Risk of Fall with Harm to all staff/Reinforce activity limits and safety measures with patient and family/Tailored Fall Risk Interventions/Visual Cue: Yellow wristband and red socks/Bed in lowest position, wheels locked, appropriate side rails in place/Call bell, personal items and telephone in reach/Instruct patient to call for assistance before getting out of bed or chair/Non-slip footwear when patient is out of bed/[place] to call system/Physically safe environment - no spills, clutter or unnecessary equipment/Purposeful Proactive Rounding/Room/bathroom lighting operational, light cord in reach Assistance with ambulation/Assistance OOB with selected safe patient handling equipment/Communicate Risk of Fall with Harm to all staff/Reinforce activity limits and safety measures with patient and family/Tailored Fall Risk Interventions/Visual Cue: Yellow wristband and red socks/Bed in lowest position, wheels locked, appropriate side rails in place/Call bell, personal items and telephone in reach/Instruct patient to call for assistance before getting out of bed or chair/Non-slip footwear when patient is out of bed/[place] to call system/Physically safe environment - no spills, clutter or unnecessary equipment/Purposeful Proactive Rounding/Room/bathroom lighting operational, light cord in reach

## 2022-09-06 ENCOUNTER — TRANSCRIPTION ENCOUNTER (OUTPATIENT)
Age: 62
End: 2022-09-06

## 2022-09-06 LAB
ANION GAP SERPL CALC-SCNC: 12 MMOL/L — SIGNIFICANT CHANGE UP (ref 7–14)
BUN SERPL-MCNC: 13 MG/DL — SIGNIFICANT CHANGE UP (ref 7–23)
CALCIUM SERPL-MCNC: 9.2 MG/DL — SIGNIFICANT CHANGE UP (ref 8.4–10.5)
CHLORIDE SERPL-SCNC: 104 MMOL/L — SIGNIFICANT CHANGE UP (ref 98–107)
CK SERPL-CCNC: 119 U/L — SIGNIFICANT CHANGE UP (ref 30–200)
CO2 SERPL-SCNC: 23 MMOL/L — SIGNIFICANT CHANGE UP (ref 22–31)
CREAT SERPL-MCNC: 1.15 MG/DL — SIGNIFICANT CHANGE UP (ref 0.5–1.3)
CRP SERPL-MCNC: 39.6 MG/L — HIGH
D DIMER BLD IA.RAPID-MCNC: <150 NG/ML DDU — SIGNIFICANT CHANGE UP
EGFR: 73 ML/MIN/1.73M2 — SIGNIFICANT CHANGE UP
FERRITIN SERPL-MCNC: 251 NG/ML — SIGNIFICANT CHANGE UP (ref 30–400)
GLUCOSE SERPL-MCNC: 96 MG/DL — SIGNIFICANT CHANGE UP (ref 70–99)
HCT VFR BLD CALC: 46.9 % — SIGNIFICANT CHANGE UP (ref 39–50)
HCV AB S/CO SERPL IA: 0.05 S/CO — SIGNIFICANT CHANGE UP (ref 0–0.99)
HCV AB SERPL-IMP: SIGNIFICANT CHANGE UP
HGB BLD-MCNC: 15.3 G/DL — SIGNIFICANT CHANGE UP (ref 13–17)
LDH SERPL L TO P-CCNC: 203 U/L — SIGNIFICANT CHANGE UP (ref 135–225)
MAGNESIUM SERPL-MCNC: 2.2 MG/DL — SIGNIFICANT CHANGE UP (ref 1.6–2.6)
MCHC RBC-ENTMCNC: 27.6 PG — SIGNIFICANT CHANGE UP (ref 27–34)
MCHC RBC-ENTMCNC: 32.6 GM/DL — SIGNIFICANT CHANGE UP (ref 32–36)
MCV RBC AUTO: 84.5 FL — SIGNIFICANT CHANGE UP (ref 80–100)
NRBC # BLD: 0 /100 WBCS — SIGNIFICANT CHANGE UP (ref 0–0)
NRBC # FLD: 0 K/UL — SIGNIFICANT CHANGE UP (ref 0–0)
PHOSPHATE SERPL-MCNC: 2.3 MG/DL — LOW (ref 2.5–4.5)
PLATELET # BLD AUTO: 204 K/UL — SIGNIFICANT CHANGE UP (ref 150–400)
POTASSIUM SERPL-MCNC: 3.6 MMOL/L — SIGNIFICANT CHANGE UP (ref 3.5–5.3)
POTASSIUM SERPL-SCNC: 3.6 MMOL/L — SIGNIFICANT CHANGE UP (ref 3.5–5.3)
PROCALCITONIN SERPL-MCNC: 0.2 NG/ML — HIGH (ref 0.02–0.1)
RBC # BLD: 5.55 M/UL — SIGNIFICANT CHANGE UP (ref 4.2–5.8)
RBC # FLD: 14.4 % — SIGNIFICANT CHANGE UP (ref 10.3–14.5)
SODIUM SERPL-SCNC: 139 MMOL/L — SIGNIFICANT CHANGE UP (ref 135–145)
WBC # BLD: 5.81 K/UL — SIGNIFICANT CHANGE UP (ref 3.8–10.5)
WBC # FLD AUTO: 5.81 K/UL — SIGNIFICANT CHANGE UP (ref 3.8–10.5)

## 2022-09-06 RX ORDER — HYDRALAZINE HCL 50 MG
25 TABLET ORAL EVERY 8 HOURS
Refills: 0 | Status: DISCONTINUED | OUTPATIENT
Start: 2022-09-06 | End: 2022-09-07

## 2022-09-06 RX ORDER — POTASSIUM CHLORIDE 20 MEQ
40 PACKET (EA) ORAL ONCE
Refills: 0 | Status: COMPLETED | OUTPATIENT
Start: 2022-09-06 | End: 2022-09-06

## 2022-09-06 RX ADMIN — AMLODIPINE BESYLATE 5 MILLIGRAM(S): 2.5 TABLET ORAL at 05:26

## 2022-09-06 RX ADMIN — Medication 0.5 MILLIGRAM(S): at 05:30

## 2022-09-06 RX ADMIN — HEPARIN SODIUM 5000 UNIT(S): 5000 INJECTION INTRAVENOUS; SUBCUTANEOUS at 13:51

## 2022-09-06 RX ADMIN — Medication 25 MILLIGRAM(S): at 21:30

## 2022-09-06 RX ADMIN — Medication 25 MILLIGRAM(S): at 05:27

## 2022-09-06 RX ADMIN — HEPARIN SODIUM 5000 UNIT(S): 5000 INJECTION INTRAVENOUS; SUBCUTANEOUS at 21:32

## 2022-09-06 RX ADMIN — Medication 40 MILLIEQUIVALENT(S): at 11:22

## 2022-09-06 RX ADMIN — HEPARIN SODIUM 5000 UNIT(S): 5000 INJECTION INTRAVENOUS; SUBCUTANEOUS at 05:26

## 2022-09-06 RX ADMIN — Medication 25 MILLIGRAM(S): at 13:51

## 2022-09-06 NOTE — DISCHARGE NOTE PROVIDER - NSDCMRMEDTOKEN_GEN_ALL_CORE_FT
amLODIPine 10 mg oral tablet: 1 tab(s) orally once a day  chlorthalidone 25 mg oral tablet: 1 tab(s) orally once a day  nortriptyline 10 mg oral capsule: 1 cap(s) orally once a day  tiZANidine 2 mg oral tablet: 1 tab(s) orally every 8 hours   amLODIPine 10 mg oral tablet: 1 tab(s) orally once a day  chlorthalidone 25 mg oral tablet: 1 tab(s) orally once a day    TEST SCRIPT  Pgr [id number]  chlorthalidone 25 mg oral tablet: 1 tab(s) orally once a day  nortriptyline 10 mg oral capsule: 1 cap(s) orally once a day  tiZANidine 2 mg oral tablet: 1 tab(s) orally every 8 hours   amLODIPine 10 mg oral tablet: 1 tab(s) orally once a day  chlorthalidone 25 mg oral tablet: 1 tab(s) orally once a day    TEST SCRIPT  Pgr [id number]  chlorthalidone 25 mg oral tablet: 1 tab(s) orally once a day  nortriptyline 10 mg oral capsule: 1 cap(s) orally once a day  tiZANidine 2 mg oral tablet: 1 tab(s) orally every 8 hours   amLODIPine 10 mg oral tablet: 1 tab(s) orally once a day  chlorthalidone 25 mg oral tablet: 1 tab(s) orally once a day    TEST SCRIPT  Pgr [id number]  chlorthalidone 25 mg oral tablet: 1 tab(s) orally once a day  nortriptyline 10 mg oral capsule: 1 cap(s) orally once a day  tiZANidine 2 mg oral tablet: 1 tab(s) orally every 8 hours

## 2022-09-06 NOTE — DISCHARGE NOTE PROVIDER - PROVIDER TOKENS
FREE:[LAST:[PCP],PHONE:[(   )    -],FAX:[(   )    -]] FREE:[LAST:[PCP],PHONE:[(   )    -],FAX:[(   )    -]],PROVIDER:[TOKEN:[[id number]]] FREE:[LAST:[PCP],PHONE:[(   )    -],FAX:[(   )    -]],PROVIDER:[TOKEN:[[id number]]] FREE:[LAST:[PCP],PHONE:[(   )    -],FAX:[(   )    -]],PROVIDER:[TOKEN:[[id number]]]

## 2022-09-06 NOTE — PROVIDER CONTACT NOTE (OTHER) - ACTION/TREATMENT ORDERED:
MOSES Kramer made aware.  RN will give PM dose of hydralazine and recheck pressure after administration. OMSES Kramer made aware.  RN will give PM dose of hydralazine and recheck pressure after administration.

## 2022-09-06 NOTE — DISCHARGE NOTE PROVIDER - CARE PROVIDER_API CALL
PCP,   Phone: (   )    -  Fax: (   )    -  Follow Up Time:    PCP,   Phone: (   )    -  Fax: (   )    -  Follow Up Time:     Diana Lopez)  Cardiovascular Disease; Internal Medicine  [address] [facility], [address]  [place] [address]  Phone: [phone]  Fax: [phone]  Follow Up Time:    PCP,   Phone: (   )    -  Fax: (   )    -  Follow Up Time:     Diana Lopez)  Cardiovascular Disease; Internal Medicine  [address] [facility], [address]  [place] [address]  Phone: [phone]  Fax: [phone]  Follow Up Time:    PCP,   Phone: (   )    -  Fax: (   )    -  Follow Up Time:     Diana Lopez)  Cardiovascular Disease; Internal Medicine  [address] [facility], [address]  [place] [address]  Phone: [phone]  Fax: [phone]  Follow Up Time:

## 2022-09-06 NOTE — DISCHARGE NOTE PROVIDER - NSDCCPCAREPLAN_GEN_ALL_CORE_FT
PRINCIPAL DISCHARGE DIAGNOSIS  Diagnosis: Hypertension  Assessment and Plan of Treatment: You came into the hospital with extreme high blood pressure       PRINCIPAL DISCHARGE DIAGNOSIS  Diagnosis: Hypertension  Assessment and Plan of Treatment: You came into the hospital with extreme high blood pressure, you were started on a medication call HYDRALAZINE which helped bring down your blood pressure, please follow up with your cardiologist within 1-2 weeks after D/C for further management of your blood pressure      SECONDARY DISCHARGE DIAGNOSES  Diagnosis: YOANDY (acute kidney injury)  Assessment and Plan of Treatment: When you were admitted to the hospital your kidney function was elevated, this was most likely due to your high blood pressure, this has since resolved when your blood pressure when down.  Please follow up with your PCP within 1-2 weeks after D/C    Diagnosis: Anxiety  Assessment and Plan of Treatment: Please continue to take your home medications and follow up with your PCP    Diagnosis: 2019 novel coronavirus disease (COVID-19)  Assessment and Plan of Treatment: You have Covid-19, your breathing is normal and you do not require and supplemental oxygen, please continue to follow up with your PCP     PRINCIPAL DISCHARGE DIAGNOSIS  Diagnosis: Hypertension  Assessment and Plan of Treatment: You came into the hospital with extreme high blood pressure, you were started on a medication call Amlodipine and Chlorthalidone which helped bring down your blood pressure, please follow up with your cardiologist within 1-2 weeks after D/C for further management of your blood pressure      SECONDARY DISCHARGE DIAGNOSES  Diagnosis: YOANDY (acute kidney injury)  Assessment and Plan of Treatment: When you were admitted to the hospital your kidney function was elevated, this was most likely due to your high blood pressure, this has since resolved when your blood pressure when down.  Please follow up with your PCP within 1-2 weeks after D/C    Diagnosis: Anxiety  Assessment and Plan of Treatment: Please continue to take your home medications and follow up with your PCP    Diagnosis: 2019 novel coronavirus disease (COVID-19)  Assessment and Plan of Treatment: You have Covid-19, your breathing is normal and you do not require and supplemental oxygen, please continue to follow up with your PCP     PRINCIPAL DISCHARGE DIAGNOSIS  Diagnosis: Hypertension  Assessment and Plan of Treatment: You came into the hospital with extreme high blood pressure, you were started on a medication call Amlodipine and Chlorthalidone which helped bring down your blood pressure, please follow up with your cardiologist within 1-2 weeks after D/C for further management of your blood pressure      SECONDARY DISCHARGE DIAGNOSES  Diagnosis: YOANDY (acute kidney injury)  Assessment and Plan of Treatment: When you were admitted to the hospital your kidney function was elevated, this was most likely due to your high blood pressure, this has since resolved when your blood pressure when down.  Please follow up with the nephrologist within 1-2 weeks after D/C    Diagnosis: Anxiety  Assessment and Plan of Treatment: Please continue to take your home medications and follow up with your PCP    Diagnosis: 2019 novel coronavirus disease (COVID-19)  Assessment and Plan of Treatment: You have Covid-19, your breathing is normal and you do not require and supplemental oxygen, please continue to follow up with your PCP     PRINCIPAL DISCHARGE DIAGNOSIS  Diagnosis: Hypertension  Assessment and Plan of Treatment: You came into the hospital with extreme high blood pressure, you were started on a medication call Amlodipine and Chlorthalidone which helped bring down your blood pressure, please follow up with your cardiologist within 1-2 weeks after D/C for further management of your blood pressure      SECONDARY DISCHARGE DIAGNOSES  Diagnosis: OYANDY (acute kidney injury)  Assessment and Plan of Treatment: When you were admitted to the hospital your kidney function was elevated, this was most likely due to your high blood pressure, this has since resolved when your blood pressure when down.  Please follow up with the nephrologist within 1-2 weeks after D/C    Diagnosis: Anxiety  Assessment and Plan of Treatment: Please continue to take your home medications and follow up with your PCP    Diagnosis: 2019 novel coronavirus disease (COVID-19)  Assessment and Plan of Treatment: You have Covid-19, your breathing is normal and you do not require and supplemental oxygen, please continue to follow up with your PCP

## 2022-09-06 NOTE — PROGRESS NOTE ADULT - SUBJECTIVE AND OBJECTIVE BOX
DATE OF SERVICE: 09-06-22    Patient denies chest pain or shortness of breath.   Review of symptoms otherwise negative.    MEDICATIONS:  acetaminophen     Tablet .. 650 milliGRAM(s) Oral every 6 hours PRN  ALBUTerol    90 MICROgram(s) HFA Inhaler 2 Puff(s) Inhalation every 4 hours PRN  amLODIPine   Tablet 5 milliGRAM(s) Oral daily  heparin   Injectable 5000 Unit(s) SubCutaneous every 8 hours  hydrALAZINE 25 milliGRAM(s) Oral every 8 hours  LORazepam     Tablet 0.5 milliGRAM(s) Oral three times a day PRN      LABS:                        15.3   5.81  )-----------( 204      ( 06 Sep 2022 08:34 )             46.9       Hemoglobin: 15.3 g/dL (09-06 @ 08:34)  Hemoglobin: 13.7 g/dL (09-04 @ 17:31)      09-06    139  |  104  |  13  ----------------------------<  96  3.6   |  23  |  1.15    Ca    9.2      06 Sep 2022 08:34  Phos  2.3     09-06  Mg     2.20     09-06      Creatinine Trend: 1.15<--, 1.43<--    COAGS:     CARDIAC MARKERS ( 06 Sep 2022 08:34 )  x     / x     / 119 U/L / x     / x            PHYSICAL EXAM:  T(C): 36.8 (09-06-22 @ 16:00), Max: 37.1 (09-05-22 @ 21:45)  HR: 72 (09-06-22 @ 16:00) (67 - 85)  BP: 146/84 (09-06-22 @ 16:00) (139/93 - 167/98)  RR: 18 (09-06-22 @ 16:00) (16 - 18)  SpO2: 100% (09-06-22 @ 16:00) (97% - 100%)  Wt(kg): --    I&O's Summary    05 Sep 2022 07:01  -  06 Sep 2022 07:00  --------------------------------------------------------  IN: 400 mL / OUT: 0 mL / NET: 400 mL    General: Well nourished in no acute distress. Alert and Oriented * 3.   Head: Normocephalic and atraumatic.   Neck: No JVD. No bruits. Supple. Does not appear to be enlarged.   Cardiovascular: + S1,S2 ; RRR Soft systolic murmur at the left lower sternal border. No rubs noted.    Lungs: CTA b/l. No rhonchi, rales or wheezes.   Abdomen: + BS, soft. Non tender. Non distended. No rebound. No guarding.   Extremities: No clubbing/cyanosis/edema.   Neurologic: Moves all four extremities. Full range of motion.   Skin: Warm and moist. The patient's skin has normal elasticity and good skin turgor.   Psychiatric: Appropriate mood and affect.  Musculoskeletal: Normal range of motion, normal strength       ELEMETRY: SR	      ECG:  	NSR  LVH, PVC    < from: CT Angio Chest Aorta w/wo IV Cont (09.04.22 @ 20:02) >    IMPRESSION:    No aortic dissection.  Hepatomegaly. Hepatic steatosis.    < end of copied text >      ASSESSMENT/PLAN: 	Pt is a 61 y/o male with pmhx of HTN and anxiety (on no medications) who presents to the ER for elevated blood pressure in the setting of 5 days of feeling fatigued, fevers/chills and neck pain radiating down his back and R arm.    --Pt is COVID +  --CTA chest ruled out aortic dissection  --upon exam and interview, pain in R shoulder is reproducible and worse when laying on that side, likely musculoskeletal  -- TTE with LVH, will intensify BP control, d/c hydralazine and start Chlorthalidone to ensure better compliance Might need a 3rd anti-hypertensive. Will start in clinic follow up.  - ischemic work-up as outpatient    José Miguel Piper MD  Pager: [phone]          DATE OF SERVICE: 09-06-22    Patient denies chest pain or shortness of breath.   Review of symptoms otherwise negative.    MEDICATIONS:  acetaminophen     Tablet .. 650 milliGRAM(s) Oral every 6 hours PRN  ALBUTerol    90 MICROgram(s) HFA Inhaler 2 Puff(s) Inhalation every 4 hours PRN  amLODIPine   Tablet 5 milliGRAM(s) Oral daily  heparin   Injectable 5000 Unit(s) SubCutaneous every 8 hours  hydrALAZINE 25 milliGRAM(s) Oral every 8 hours  LORazepam     Tablet 0.5 milliGRAM(s) Oral three times a day PRN      LABS:                        15.3   5.81  )-----------( 204      ( 06 Sep 2022 08:34 )             46.9       Hemoglobin: 15.3 g/dL (09-06 @ 08:34)  Hemoglobin: 13.7 g/dL (09-04 @ 17:31)      09-06    139  |  104  |  13  ----------------------------<  96  3.6   |  23  |  1.15    Ca    9.2      06 Sep 2022 08:34  Phos  2.3     09-06  Mg     2.20     09-06      Creatinine Trend: 1.15<--, 1.43<--    COAGS:     CARDIAC MARKERS ( 06 Sep 2022 08:34 )  x     / x     / 119 U/L / x     / x            PHYSICAL EXAM:  T(C): 36.8 (09-06-22 @ 16:00), Max: 37.1 (09-05-22 @ 21:45)  HR: 72 (09-06-22 @ 16:00) (67 - 85)  BP: 146/84 (09-06-22 @ 16:00) (139/93 - 167/98)  RR: 18 (09-06-22 @ 16:00) (16 - 18)  SpO2: 100% (09-06-22 @ 16:00) (97% - 100%)  Wt(kg): --    I&O's Summary    05 Sep 2022 07:01  -  06 Sep 2022 07:00  --------------------------------------------------------  IN: 400 mL / OUT: 0 mL / NET: 400 mL    General: Well nourished in no acute distress. Alert and Oriented * 3.   Head: Normocephalic and atraumatic.   Neck: No JVD. No bruits. Supple. Does not appear to be enlarged.   Cardiovascular: + S1,S2 ; RRR Soft systolic murmur at the left lower sternal border. No rubs noted.    Lungs: CTA b/l. No rhonchi, rales or wheezes.   Abdomen: + BS, soft. Non tender. Non distended. No rebound. No guarding.   Extremities: No clubbing/cyanosis/edema.   Neurologic: Moves all four extremities. Full range of motion.   Skin: Warm and moist. The patient's skin has normal elasticity and good skin turgor.   Psychiatric: Appropriate mood and affect.  Musculoskeletal: Normal range of motion, normal strength       ELEMETRY: SR	      ECG:  	NSR  LVH, PVC    < from: CT Angio Chest Aorta w/wo IV Cont (09.04.22 @ 20:02) >    IMPRESSION:    No aortic dissection.  Hepatomegaly. Hepatic steatosis.    < end of copied text >      ASSESSMENT/PLAN: 	Pt is a 63 y/o male with pmhx of HTN and anxiety (on no medications) who presents to the ER for elevated blood pressure in the setting of 5 days of feeling fatigued, fevers/chills and neck pain radiating down his back and R arm.    --Pt is COVID +  --CTA chest ruled out aortic dissection  --upon exam and interview, pain in R shoulder is reproducible and worse when laying on that side, likely musculoskeletal  -- TTE with LVH, will intensify BP control, d/c hydralazine and start Chlorthalidone to ensure better compliance Might need a 3rd anti-hypertensive. Will start in clinic follow up.  - ischemic work-up as outpatient    José Miguel Piper MD  Pager: [phone]          DATE OF SERVICE: 09-06-22    Patient denies chest pain or shortness of breath.   Review of symptoms otherwise negative.    MEDICATIONS:  acetaminophen     Tablet .. 650 milliGRAM(s) Oral every 6 hours PRN  ALBUTerol    90 MICROgram(s) HFA Inhaler 2 Puff(s) Inhalation every 4 hours PRN  amLODIPine   Tablet 5 milliGRAM(s) Oral daily  heparin   Injectable 5000 Unit(s) SubCutaneous every 8 hours  hydrALAZINE 25 milliGRAM(s) Oral every 8 hours  LORazepam     Tablet 0.5 milliGRAM(s) Oral three times a day PRN      LABS:                        15.3   5.81  )-----------( 204      ( 06 Sep 2022 08:34 )             46.9       Hemoglobin: 15.3 g/dL (09-06 @ 08:34)  Hemoglobin: 13.7 g/dL (09-04 @ 17:31)      09-06    139  |  104  |  13  ----------------------------<  96  3.6   |  23  |  1.15    Ca    9.2      06 Sep 2022 08:34  Phos  2.3     09-06  Mg     2.20     09-06      Creatinine Trend: 1.15<--, 1.43<--    COAGS:     CARDIAC MARKERS ( 06 Sep 2022 08:34 )  x     / x     / 119 U/L / x     / x            PHYSICAL EXAM:  T(C): 36.8 (09-06-22 @ 16:00), Max: 37.1 (09-05-22 @ 21:45)  HR: 72 (09-06-22 @ 16:00) (67 - 85)  BP: 146/84 (09-06-22 @ 16:00) (139/93 - 167/98)  RR: 18 (09-06-22 @ 16:00) (16 - 18)  SpO2: 100% (09-06-22 @ 16:00) (97% - 100%)  Wt(kg): --    I&O's Summary    05 Sep 2022 07:01  -  06 Sep 2022 07:00  --------------------------------------------------------  IN: 400 mL / OUT: 0 mL / NET: 400 mL    General: Well nourished in no acute distress. Alert and Oriented * 3.   Head: Normocephalic and atraumatic.   Neck: No JVD. No bruits. Supple. Does not appear to be enlarged.   Cardiovascular: + S1,S2 ; RRR Soft systolic murmur at the left lower sternal border. No rubs noted.    Lungs: CTA b/l. No rhonchi, rales or wheezes.   Abdomen: + BS, soft. Non tender. Non distended. No rebound. No guarding.   Extremities: No clubbing/cyanosis/edema.   Neurologic: Moves all four extremities. Full range of motion.   Skin: Warm and moist. The patient's skin has normal elasticity and good skin turgor.   Psychiatric: Appropriate mood and affect.  Musculoskeletal: Normal range of motion, normal strength       ELEMETRY: SR	      ECG:  	NSR  LVH, PVC    < from: CT Angio Chest Aorta w/wo IV Cont (09.04.22 @ 20:02) >    IMPRESSION:    No aortic dissection.  Hepatomegaly. Hepatic steatosis.    < end of copied text >      ASSESSMENT/PLAN: 	Pt is a 61 y/o male with pmhx of HTN and anxiety (on no medications) who presents to the ER for elevated blood pressure in the setting of 5 days of feeling fatigued, fevers/chills and neck pain radiating down his back and R arm.    --Pt is COVID +  --CTA chest ruled out aortic dissection  --upon exam and interview, pain in R shoulder is reproducible and worse when laying on that side, likely musculoskeletal  -- TTE with LVH, will intensify BP control, d/c hydralazine and start Chlorthalidone to ensure better compliance Might need a 3rd anti-hypertensive. Will start in clinic follow up.  - ischemic work-up as outpatient    José Miguel Piper MD  Pager: [phone]

## 2022-09-06 NOTE — PROGRESS NOTE ADULT - SUBJECTIVE AND OBJECTIVE BOX
EP Attending    HISTORY OF PRESENT ILLNESS: HPI:  Pt is a 63 y/o male with pmhx of HTN and anxiety (on no medications) who presents to the ER for elevated blood pressure in the setting of 5 days of feeling fatigue and neck pain radiating down his back and R arm. HE reports that it might have started after he lifted a heavy trashbag. No weak or tingling sensation and no worsening on exertion. He denies any chest pain nor shortness of breath and denies any cardiac history. He does endorse mild cough and post nasal drip, otherwise no headache nor fever. No abd pain, diarrhea or dysuria. He has been having a good appetite. He does endorse feeling more anxious and slightly depressed recently due to being alone but denies any suicidal ideation. He reports he was on meds in the past but not recently. In the Er pt was found hypertensive and given amlodipine and hydralazine. Also given a dose of ativan for anxiety.  (04 Sep 2022 22:29)    9/5-  patient has chronic anxiety and tends to avoid medical care. has palpitations on a near-daily basis, but no fainting.  has nonspecific chest discomfort radiating into his arms and neck, but is not sure if this is angina, since it can happen at rest.  no prior known MI or stroke.  no prior known vascular disease.  he presented in hypertensive urgency vs emergency, /110s with chest/neck discomfort.  No dissection on CTA.  sinus rhythm on telemetry. He is essentially a new start to oral antihypertensives after lapsed care for a few years.    A 10 pt ROS is otherwise negative.  Date of service 9/6- feeling well, able to lie flat, no significant shortness of breath.  no angina or palpitations today.    acetaminophen     Tablet .. 650 milliGRAM(s) Oral every 6 hours PRN  ALBUTerol    90 MICROgram(s) HFA Inhaler 2 Puff(s) Inhalation every 4 hours PRN  amLODIPine   Tablet 5 milliGRAM(s) Oral daily  heparin   Injectable 5000 Unit(s) SubCutaneous every 8 hours  hydrALAZINE 25 milliGRAM(s) Oral every 8 hours  LORazepam     Tablet 0.5 milliGRAM(s) Oral three times a day PRN                            15.3   5.81  )-----------( 204      ( 06 Sep 2022 08:34 )             46.9       09-06    139  |  104  |  13  ----------------------------<  96  3.6   |  23  |  1.15    Ca    9.2      06 Sep 2022 08:34  Phos  2.3     09-06  Mg     2.20     09-06    TPro  6.8  /  Alb  4.1  /  TBili  0.5  /  DBili  x   /  AST  25  /  ALT  22  /  AlkPhos  80  09-04      CARDIAC MARKERS ( 06 Sep 2022 08:34 )  x     / x     / 119 U/L / x     / x          T(C): 36.8 (09-06-22 @ 12:00), Max: 37.2 (09-05-22 @ 18:39)  HR: 78 (09-06-22 @ 13:45) (67 - 90)  BP: 153/98 (09-06-22 @ 13:45) (139/93 - 180/100)  RR: 18 (09-06-22 @ 12:00) (16 - 19)  SpO2: 100% (09-06-22 @ 12:00) (97% - 100%)  Wt(kg): --    I&O's Summary    05 Sep 2022 07:01  -  06 Sep 2022 07:00  --------------------------------------------------------  IN: 400 mL / OUT: 0 mL / NET: 400 mL      General: Well nourished, no acute distress, alert and oriented x 3  Head: normocephalic, no trauma  Neck: no JVD, no bruit, supple, not enlarged  CV: S1S2, no S3, regular rate, rhythm is SINUS, no murmurs.    Lungs: clear BL, no rales or wheezes  Abdomen: bowel sounds +, soft, nontender, nondistended  Extremities: no clubbing, cyanosis or edema  Neuro: Moves all 4 extremities, sensation intact x 4 extremities  Skin: warm and moist, normal turgor  Psych: Mood and affect are appropriate for circumstances  MSK: normal range of motion and strength x4 extremities.      TELEMETRY: NSR, no AFib.    ECG:  	NSR , anterior and lateral T wave inversions, PAc's.    < from: CT Angio Chest Aorta w/wo IV Cont (09.04.22 @ 20:02) >    IMPRESSION:    No aortic dissection.  Hepatomegaly. Hepatic steatosis.    < end of copied text >      ASSESSMENT/PLAN: 	Pt is a 63 y/o male here with shortness of breath, fatigue, atypical chest pain.  Found to have COVID.  Lapsed care for hypertension.  Palpitations.  Anxiety disorder?    --Expected pulmonary and systemic symptoms from COVID  --Echo reassuring with normal LVEF.  --Antihypertensives per general cardiology. Could go home on a 2 or 3 drug regimen and have doses adjusted in office when he is recovered.  --Telemetry monitoring for AFib or SVT.  Would defer invasive management of any arrhythmia until after cleared from COVID isolation.  --Maintain K 4-4.5, Mg 2 via oral route.  --If no arrhythmia identified in hospital, will recommend ambulatory event monitoring.    Jean Jo M.D.  Cardiac Electrophysiology  [phone]     EP Attending    HISTORY OF PRESENT ILLNESS: HPI:  Pt is a 61 y/o male with pmhx of HTN and anxiety (on no medications) who presents to the ER for elevated blood pressure in the setting of 5 days of feeling fatigue and neck pain radiating down his back and R arm. HE reports that it might have started after he lifted a heavy trashbag. No weak or tingling sensation and no worsening on exertion. He denies any chest pain nor shortness of breath and denies any cardiac history. He does endorse mild cough and post nasal drip, otherwise no headache nor fever. No abd pain, diarrhea or dysuria. He has been having a good appetite. He does endorse feeling more anxious and slightly depressed recently due to being alone but denies any suicidal ideation. He reports he was on meds in the past but not recently. In the Er pt was found hypertensive and given amlodipine and hydralazine. Also given a dose of ativan for anxiety.  (04 Sep 2022 22:29)    9/5-  patient has chronic anxiety and tends to avoid medical care. has palpitations on a near-daily basis, but no fainting.  has nonspecific chest discomfort radiating into his arms and neck, but is not sure if this is angina, since it can happen at rest.  no prior known MI or stroke.  no prior known vascular disease.  he presented in hypertensive urgency vs emergency, /110s with chest/neck discomfort.  No dissection on CTA.  sinus rhythm on telemetry. He is essentially a new start to oral antihypertensives after lapsed care for a few years.    A 10 pt ROS is otherwise negative.  Date of service 9/6- feeling well, able to lie flat, no significant shortness of breath.  no angina or palpitations today.    acetaminophen     Tablet .. 650 milliGRAM(s) Oral every 6 hours PRN  ALBUTerol    90 MICROgram(s) HFA Inhaler 2 Puff(s) Inhalation every 4 hours PRN  amLODIPine   Tablet 5 milliGRAM(s) Oral daily  heparin   Injectable 5000 Unit(s) SubCutaneous every 8 hours  hydrALAZINE 25 milliGRAM(s) Oral every 8 hours  LORazepam     Tablet 0.5 milliGRAM(s) Oral three times a day PRN                            15.3   5.81  )-----------( 204      ( 06 Sep 2022 08:34 )             46.9       09-06    139  |  104  |  13  ----------------------------<  96  3.6   |  23  |  1.15    Ca    9.2      06 Sep 2022 08:34  Phos  2.3     09-06  Mg     2.20     09-06    TPro  6.8  /  Alb  4.1  /  TBili  0.5  /  DBili  x   /  AST  25  /  ALT  22  /  AlkPhos  80  09-04      CARDIAC MARKERS ( 06 Sep 2022 08:34 )  x     / x     / 119 U/L / x     / x          T(C): 36.8 (09-06-22 @ 12:00), Max: 37.2 (09-05-22 @ 18:39)  HR: 78 (09-06-22 @ 13:45) (67 - 90)  BP: 153/98 (09-06-22 @ 13:45) (139/93 - 180/100)  RR: 18 (09-06-22 @ 12:00) (16 - 19)  SpO2: 100% (09-06-22 @ 12:00) (97% - 100%)  Wt(kg): --    I&O's Summary    05 Sep 2022 07:01  -  06 Sep 2022 07:00  --------------------------------------------------------  IN: 400 mL / OUT: 0 mL / NET: 400 mL      General: Well nourished, no acute distress, alert and oriented x 3  Head: normocephalic, no trauma  Neck: no JVD, no bruit, supple, not enlarged  CV: S1S2, no S3, regular rate, rhythm is SINUS, no murmurs.    Lungs: clear BL, no rales or wheezes  Abdomen: bowel sounds +, soft, nontender, nondistended  Extremities: no clubbing, cyanosis or edema  Neuro: Moves all 4 extremities, sensation intact x 4 extremities  Skin: warm and moist, normal turgor  Psych: Mood and affect are appropriate for circumstances  MSK: normal range of motion and strength x4 extremities.      TELEMETRY: NSR, no AFib.    ECG:  	NSR , anterior and lateral T wave inversions, PAc's.    < from: CT Angio Chest Aorta w/wo IV Cont (09.04.22 @ 20:02) >    IMPRESSION:    No aortic dissection.  Hepatomegaly. Hepatic steatosis.    < end of copied text >      ASSESSMENT/PLAN: 	Pt is a 61 y/o male here with shortness of breath, fatigue, atypical chest pain.  Found to have COVID.  Lapsed care for hypertension.  Palpitations.  Anxiety disorder?    --Expected pulmonary and systemic symptoms from COVID  --Echo reassuring with normal LVEF.  --Antihypertensives per general cardiology. Could go home on a 2 or 3 drug regimen and have doses adjusted in office when he is recovered.  --Telemetry monitoring for AFib or SVT.  Would defer invasive management of any arrhythmia until after cleared from COVID isolation.  --Maintain K 4-4.5, Mg 2 via oral route.  --If no arrhythmia identified in hospital, will recommend ambulatory event monitoring.    Jean Jo M.D.  Cardiac Electrophysiology  [phone]     EP Attending    HISTORY OF PRESENT ILLNESS: HPI:  Pt is a 61 y/o male with pmhx of HTN and anxiety (on no medications) who presents to the ER for elevated blood pressure in the setting of 5 days of feeling fatigue and neck pain radiating down his back and R arm. HE reports that it might have started after he lifted a heavy trashbag. No weak or tingling sensation and no worsening on exertion. He denies any chest pain nor shortness of breath and denies any cardiac history. He does endorse mild cough and post nasal drip, otherwise no headache nor fever. No abd pain, diarrhea or dysuria. He has been having a good appetite. He does endorse feeling more anxious and slightly depressed recently due to being alone but denies any suicidal ideation. He reports he was on meds in the past but not recently. In the Er pt was found hypertensive and given amlodipine and hydralazine. Also given a dose of ativan for anxiety.  (04 Sep 2022 22:29)    9/5-  patient has chronic anxiety and tends to avoid medical care. has palpitations on a near-daily basis, but no fainting.  has nonspecific chest discomfort radiating into his arms and neck, but is not sure if this is angina, since it can happen at rest.  no prior known MI or stroke.  no prior known vascular disease.  he presented in hypertensive urgency vs emergency, /110s with chest/neck discomfort.  No dissection on CTA.  sinus rhythm on telemetry. He is essentially a new start to oral antihypertensives after lapsed care for a few years.    A 10 pt ROS is otherwise negative.  Date of service 9/6- feeling well, able to lie flat, no significant shortness of breath.  no angina or palpitations today.    acetaminophen     Tablet .. 650 milliGRAM(s) Oral every 6 hours PRN  ALBUTerol    90 MICROgram(s) HFA Inhaler 2 Puff(s) Inhalation every 4 hours PRN  amLODIPine   Tablet 5 milliGRAM(s) Oral daily  heparin   Injectable 5000 Unit(s) SubCutaneous every 8 hours  hydrALAZINE 25 milliGRAM(s) Oral every 8 hours  LORazepam     Tablet 0.5 milliGRAM(s) Oral three times a day PRN                            15.3   5.81  )-----------( 204      ( 06 Sep 2022 08:34 )             46.9       09-06    139  |  104  |  13  ----------------------------<  96  3.6   |  23  |  1.15    Ca    9.2      06 Sep 2022 08:34  Phos  2.3     09-06  Mg     2.20     09-06    TPro  6.8  /  Alb  4.1  /  TBili  0.5  /  DBili  x   /  AST  25  /  ALT  22  /  AlkPhos  80  09-04      CARDIAC MARKERS ( 06 Sep 2022 08:34 )  x     / x     / 119 U/L / x     / x          T(C): 36.8 (09-06-22 @ 12:00), Max: 37.2 (09-05-22 @ 18:39)  HR: 78 (09-06-22 @ 13:45) (67 - 90)  BP: 153/98 (09-06-22 @ 13:45) (139/93 - 180/100)  RR: 18 (09-06-22 @ 12:00) (16 - 19)  SpO2: 100% (09-06-22 @ 12:00) (97% - 100%)  Wt(kg): --    I&O's Summary    05 Sep 2022 07:01  -  06 Sep 2022 07:00  --------------------------------------------------------  IN: 400 mL / OUT: 0 mL / NET: 400 mL      General: Well nourished, no acute distress, alert and oriented x 3  Head: normocephalic, no trauma  Neck: no JVD, no bruit, supple, not enlarged  CV: S1S2, no S3, regular rate, rhythm is SINUS, no murmurs.    Lungs: clear BL, no rales or wheezes  Abdomen: bowel sounds +, soft, nontender, nondistended  Extremities: no clubbing, cyanosis or edema  Neuro: Moves all 4 extremities, sensation intact x 4 extremities  Skin: warm and moist, normal turgor  Psych: Mood and affect are appropriate for circumstances  MSK: normal range of motion and strength x4 extremities.      TELEMETRY: NSR, no AFib.    ECG:  	NSR , anterior and lateral T wave inversions, PAc's.    < from: CT Angio Chest Aorta w/wo IV Cont (09.04.22 @ 20:02) >    IMPRESSION:    No aortic dissection.  Hepatomegaly. Hepatic steatosis.    < end of copied text >      ASSESSMENT/PLAN: 	Pt is a 61 y/o male here with shortness of breath, fatigue, atypical chest pain.  Found to have COVID.  Lapsed care for hypertension.  Palpitations.  Anxiety disorder?    --Expected pulmonary and systemic symptoms from COVID  --Echo reassuring with normal LVEF.  --Antihypertensives per general cardiology. Could go home on a 2 or 3 drug regimen and have doses adjusted in office when he is recovered.  --Telemetry monitoring for AFib or SVT.  Would defer invasive management of any arrhythmia until after cleared from COVID isolation.  --Maintain K 4-4.5, Mg 2 via oral route.  --If no arrhythmia identified in hospital, will recommend ambulatory event monitoring.    Jean Jo M.D.  Cardiac Electrophysiology  [phone]

## 2022-09-06 NOTE — DISCHARGE NOTE PROVIDER - NSFOLLOWUPCLINICS_GEN_ALL_ED_FT
[facility] - Primary Care  Primary Care  [address] [place] [address]  Phone: [phone]  Fax:      [facility] - Primary Care  Primary Care  [address] [place] [address]  Phone: [phone]  Fax:      [facility] - Primary Care  Primary Care  [address] [place] [address]  Phone: [phone]  Fax:

## 2022-09-06 NOTE — DISCHARGE NOTE PROVIDER - HOSPITAL COURSE
Pt is a 61 y/o male with pmhx of HTN and anxiety (on no medications) who presents to the ER for elevated blood pressure in the setting of 5 days of feeling fatigued, fevers/chills and neck pain radiating down his back and R arm.      HTN Urgency  - CTA chest ruled out aortic dissection  - upon exam and interview, pain in R shoulder is reproducible and worse when laying on that side, likely musculoskeletal  - HTN slowly improving Pt started on Hydralazine, titrated -------------------  - TTE: EF: 63%, Normal left ventricular systolic function. No segmental wall motion abnormalities.  -  eventual ischemic eval in the office once covid resolved    YOANDY  - resolved     Covid-19  - Pt on RA will hold off remdesivir and steroids  - trend IM  - Dimer NEG    Patient seen and evaluated. Reviewed discharge medications with patient and attending. All new medications requiring new prescriptions were sent to the pharmacy of patient's choice. Reviewed need for prescription for previous home medications and new prescriptions sent if requested. Medically cleared/stable for discharge as per   with appropriate follow up. Patient understands and agrees with plan of care.        Pt is a 63 y/o male with pmhx of HTN and anxiety (on no medications) who presents to the ER for elevated blood pressure in the setting of 5 days of feeling fatigued, fevers/chills and neck pain radiating down his back and R arm.      HTN Urgency  - CTA chest ruled out aortic dissection  - upon exam and interview, pain in R shoulder is reproducible and worse when laying on that side, likely musculoskeletal  - HTN slowly improving Pt started on Hydralazine, titrated -------------------  - TTE: EF: 63%, Normal left ventricular systolic function. No segmental wall motion abnormalities.  -  eventual ischemic eval in the office once covid resolved    YOANDY  - resolved     Covid-19  - Pt on RA will hold off remdesivir and steroids  - trend IM  - Dimer NEG    Patient seen and evaluated. Reviewed discharge medications with patient and attending. All new medications requiring new prescriptions were sent to the pharmacy of patient's choice. Reviewed need for prescription for previous home medications and new prescriptions sent if requested. Medically cleared/stable for discharge as per   with appropriate follow up. Patient understands and agrees with plan of care.        Pt is a 63 y/o male with pmhx of HTN and anxiety (on no medications) who presents to the ER for elevated blood pressure in the setting of 5 days of feeling fatigued, fevers/chills and neck pain radiating down his back and R arm.    HTN Urgency  - CTA chest ruled out aortic dissection  - upon exam and interview, pain in R shoulder is reproducible and worse when laying on that side, likely musculoskeletal  - HTN slowly improving Pt started on Hydralazine, titrated -------------------  - TTE: EF: 63%, Normal left ventricular systolic function. No segmental wall motion abnormalities.  -  eventual ischemic eval in the office once covid resolved    YOANDY  - resolved     Covid-19  - Pt on RA will hold off remdesivir and steroids  - trend IM  - Dimer NEG    Patient seen and evaluated. Reviewed discharge medications with patient and attending. All new medications requiring new prescriptions were sent to the pharmacy of patient's choice. Reviewed need for prescription for previous home medications and new prescriptions sent if requested. Medically cleared/stable for discharge as per   with appropriate follow up. Patient understands and agrees with plan of care.        Pt is a 63 y/o male with pmhx of HTN and anxiety (on no medications) who presents to the ER for elevated blood pressure in the setting of 5 days of feeling fatigued, fevers/chills and neck pain radiating down his back and R arm.    HTN Urgency  - CTA chest ruled out aortic dissection  - upon exam and interview, pain in R shoulder is reproducible and worse when laying on that side, likely musculoskeletal  - HTN slowly improving Pt started on Hydralazine, titrated -------------------  - TTE: EF: 63%, Normal left ventricular systolic function. No segmental wall motion abnormalities.  -  eventual ischemic eval in the office once covid resolved    YOANDY  - resolved     Covid-19  - Pt on RA will hold off remdesivir and steroids  - trend IM  - Dimer NEG    Patient seen and evaluated. Reviewed discharge medications with patient and attending. All new medications requiring new prescriptions were sent to the pharmacy of patient's choice. Reviewed need for prescription for previous home medications and new prescriptions sent if requested. Medically cleared/stable for discharge as per Dr. Piper on 9/9/22  with appropriate follow up. Patient understands and agrees with plan of care.        Pt is a 61 y/o male with pmhx of HTN and anxiety (on no medications) who presents to the ER for elevated blood pressure in the setting of 5 days of feeling fatigued, fevers/chills and neck pain radiating down his back and R arm.    HTN Urgency  - CTA chest ruled out aortic dissection  - upon exam and interview, pain in R shoulder is reproducible and worse when laying on that side, likely musculoskeletal  - HTN slowly improving Pt started on Hydralazine, titrated -------------------  - TTE: EF: 63%, Normal left ventricular systolic function. No segmental wall motion abnormalities.  -  eventual ischemic eval in the office once covid resolved    YOANDY  - resolved     Covid-19  - Pt on RA will hold off remdesivir and steroids  - trend IM  - Dimer NEG    Patient seen and evaluated. Reviewed discharge medications with patient and attending. All new medications requiring new prescriptions were sent to the pharmacy of patient's choice. Reviewed need for prescription for previous home medications and new prescriptions sent if requested. Medically cleared/stable for discharge as per Dr. Piper on 9/9/22  with appropriate follow up. Patient understands and agrees with plan of care.        Pt is a 61 y/o male with pmhx of HTN and anxiety (on no medications) who presents to the ER for elevated blood pressure in the setting of 5 days of feeling fatigued, fevers/chills and neck pain radiating down his back and R arm.    HTN Urgency  - CTA chest ruled out aortic dissection  - upon exam and interview, pain in R shoulder is reproducible and worse when laying on that side, likely musculoskeletal  - HTN slowly improving Pt started on Hydralazine, switched to Chlorthalidone and Amlodipine 10 mg  - TTE: EF: 63%, Normal left ventricular systolic function. No segmental wall motion abnormalities.  - eventual ischemic eval in the office once covid resolved    YOANDY  - resolved     Covid-19  - Pt on RA will hold off remdesivir and steroids  - trend IM  - Dimer NEG    Patient seen and evaluated. Reviewed discharge medications with patient and attending. All new medications requiring new prescriptions were sent to the pharmacy of patient's choice. Reviewed need for prescription for previous home medications and new prescriptions sent if requested. Medically cleared/stable for discharge as per Dr. Piper on 9/9/22  with appropriate follow up. Patient understands and agrees with plan of care.        Pt is a 63 y/o male with pmhx of HTN and anxiety (on no medications) who presents to the ER for elevated blood pressure in the setting of 5 days of feeling fatigued, fevers/chills and neck pain radiating down his back and R arm.    HTN Urgency  - CTA chest ruled out aortic dissection  - upon exam and interview, pain in R shoulder is reproducible and worse when laying on that side, likely musculoskeletal  - HTN slowly improving Pt started on Hydralazine, switched to Chlorthalidone and Amlodipine 10 mg  - TTE: EF: 63%, Normal left ventricular systolic function. No segmental wall motion abnormalities.  - eventual ischemic eval in the office once covid resolved    YOANDY  - resolved     Covid-19  - Pt on RA will hold off remdesivir and steroids  - trend IM  - Dimer NEG    Patient seen and evaluated. Reviewed discharge medications with patient and attending. All new medications requiring new prescriptions were sent to the pharmacy of patient's choice. Reviewed need for prescription for previous home medications and new prescriptions sent if requested. Medically cleared/stable for discharge as per Dr. Piper on 9/9/22  with appropriate follow up. Patient understands and agrees with plan of care.

## 2022-09-06 NOTE — DISCHARGE NOTE PROVIDER - NSDCFUADDAPPT_GEN_ALL_CORE_FT
Please follow up with your PCP within 1-2 weeks after D/C Please follow up with your PCP within 1-2 weeks after D/C  Please follow up with Dr Cadena 9/20 at 1:00PM,  [phone] Please follow up with your PCP within 1-2 weeks after D/C  Please follow up with Dr Cadena 9/20 at 1:00PM,  [phone] Please follow up with your PCP within 1-2 weeks after D/C  Please follow up with Dr Cadena 9/20 at 1:00PM,  [phone] Please follow up with your PCP within 1-2 weeks after D/C    Please follow up with Dr Cadena 9/20 at 1:00PM,  [phone]    Please follow up with the nephrologist Dr. Faisal Hernandez MD [facility]  Office: [phone], please call office for appointment regarding your kidney function  Please follow up with your PCP within 1-2 weeks after D/C    Please follow up with Dr Cadena 9/20 at 1:00PM,  [phone]    Please follow up with the nephrologist Dr. Faisal Hernandez MD [facility]  Office: [phone], please call office for appointment regarding your kidney function  Please follow up with your PCP within 1-2 weeks after D/C    Please follow up with Dr Cadena 9/20 at 1:00PM,  [phone]    Please follow up with the nephrologist Dr. Faisal Hernandez MD [facility]  Office: [phone], please call office for appointment regarding your kidney function  Please follow up with your PCP within 1-2 weeks after D/C, there is the number to the [facility]    Please follow up with Dr Cadena 9/20 at 1:00PM,  [phone]    Please follow up with the nephrologist Dr. Faisal Hernandez MD [facility]  Office: [phone], please call office for appointment regarding your kidney function  Please follow up with your PCP within 1-2 weeks after D/C, there is the number to the [facility]    Please follow up with Dr Cadena 9/20 at 1:00PM,  [phone]    Please follow up with the nephrologist Dr. Faisal Hernandez MD [facility]  Office: [phone], please call office for appointment regarding your kidney function  Please follow up with your PCP within 1-2 weeks after D/C, there is the number to the [facility]    Please follow up with Dr Cadena 9/20 at 1:00PM,  [phone]    Please follow up with the nephrologist Dr. Faisal Hernandez MD [facility]  Office: [phone], please call office for appointment regarding your kidney function

## 2022-09-07 LAB
ANION GAP SERPL CALC-SCNC: 15 MMOL/L — HIGH (ref 7–14)
BASOPHILS # BLD AUTO: 0.03 K/UL — SIGNIFICANT CHANGE UP (ref 0–0.2)
BASOPHILS NFR BLD AUTO: 0.6 % — SIGNIFICANT CHANGE UP (ref 0–2)
BUN SERPL-MCNC: 20 MG/DL — SIGNIFICANT CHANGE UP (ref 7–23)
CALCIUM SERPL-MCNC: 9.3 MG/DL — SIGNIFICANT CHANGE UP (ref 8.4–10.5)
CHLORIDE SERPL-SCNC: 106 MMOL/L — SIGNIFICANT CHANGE UP (ref 98–107)
CO2 SERPL-SCNC: 21 MMOL/L — LOW (ref 22–31)
CREAT ?TM UR-MCNC: 146 MG/DL — SIGNIFICANT CHANGE UP
CREAT SERPL-MCNC: 1.28 MG/DL — SIGNIFICANT CHANGE UP (ref 0.5–1.3)
EGFR: 64 ML/MIN/1.73M2 — SIGNIFICANT CHANGE UP
EOSINOPHIL # BLD AUTO: 0.1 K/UL — SIGNIFICANT CHANGE UP (ref 0–0.5)
EOSINOPHIL NFR BLD AUTO: 2 % — SIGNIFICANT CHANGE UP (ref 0–6)
GLUCOSE SERPL-MCNC: 97 MG/DL — SIGNIFICANT CHANGE UP (ref 70–99)
HCT VFR BLD CALC: 47.8 % — SIGNIFICANT CHANGE UP (ref 39–50)
HGB BLD-MCNC: 15.6 G/DL — SIGNIFICANT CHANGE UP (ref 13–17)
IANC: 2.9 K/UL — SIGNIFICANT CHANGE UP (ref 1.8–7.4)
IMM GRANULOCYTES NFR BLD AUTO: 0.4 % — SIGNIFICANT CHANGE UP (ref 0–1.5)
LYMPHOCYTES # BLD AUTO: 1.56 K/UL — SIGNIFICANT CHANGE UP (ref 1–3.3)
LYMPHOCYTES # BLD AUTO: 30.8 % — SIGNIFICANT CHANGE UP (ref 13–44)
MAGNESIUM SERPL-MCNC: 2.2 MG/DL — SIGNIFICANT CHANGE UP (ref 1.6–2.6)
MCHC RBC-ENTMCNC: 27.9 PG — SIGNIFICANT CHANGE UP (ref 27–34)
MCHC RBC-ENTMCNC: 32.6 GM/DL — SIGNIFICANT CHANGE UP (ref 32–36)
MCV RBC AUTO: 85.5 FL — SIGNIFICANT CHANGE UP (ref 80–100)
MONOCYTES # BLD AUTO: 0.46 K/UL — SIGNIFICANT CHANGE UP (ref 0–0.9)
MONOCYTES NFR BLD AUTO: 9.1 % — SIGNIFICANT CHANGE UP (ref 2–14)
NEUTROPHILS # BLD AUTO: 2.9 K/UL — SIGNIFICANT CHANGE UP (ref 1.8–7.4)
NEUTROPHILS NFR BLD AUTO: 57.1 % — SIGNIFICANT CHANGE UP (ref 43–77)
NRBC # BLD: 0 /100 WBCS — SIGNIFICANT CHANGE UP (ref 0–0)
NRBC # FLD: 0 K/UL — SIGNIFICANT CHANGE UP (ref 0–0)
PHOSPHATE SERPL-MCNC: 2.4 MG/DL — LOW (ref 2.5–4.5)
PLATELET # BLD AUTO: 221 K/UL — SIGNIFICANT CHANGE UP (ref 150–400)
POTASSIUM SERPL-MCNC: 3.8 MMOL/L — SIGNIFICANT CHANGE UP (ref 3.5–5.3)
POTASSIUM SERPL-SCNC: 3.8 MMOL/L — SIGNIFICANT CHANGE UP (ref 3.5–5.3)
PROT ?TM UR-MCNC: 18 MG/DL — SIGNIFICANT CHANGE UP
PROT/CREAT UR-RTO: 0.1 RATIO — SIGNIFICANT CHANGE UP (ref 0–0.2)
RBC # BLD: 5.59 M/UL — SIGNIFICANT CHANGE UP (ref 4.2–5.8)
RBC # FLD: 14.1 % — SIGNIFICANT CHANGE UP (ref 10.3–14.5)
SODIUM SERPL-SCNC: 142 MMOL/L — SIGNIFICANT CHANGE UP (ref 135–145)
WBC # BLD: 5.07 K/UL — SIGNIFICANT CHANGE UP (ref 3.8–10.5)
WBC # FLD AUTO: 5.07 K/UL — SIGNIFICANT CHANGE UP (ref 3.8–10.5)

## 2022-09-07 PROCEDURE — 99223 1ST HOSP IP/OBS HIGH 75: CPT

## 2022-09-07 RX ORDER — AMLODIPINE BESYLATE 2.5 MG/1
10 TABLET ORAL DAILY
Refills: 0 | Status: DISCONTINUED | OUTPATIENT
Start: 2022-09-07 | End: 2022-09-09

## 2022-09-07 RX ORDER — CHLORTHALIDONE 50 MG
25 TABLET ORAL DAILY
Refills: 0 | Status: DISCONTINUED | OUTPATIENT
Start: 2022-09-07 | End: 2022-09-09

## 2022-09-07 RX ORDER — GUAIFENESIN/DEXTROMETHORPHAN 600MG-30MG
5 TABLET, EXTENDED RELEASE 12 HR ORAL ONCE
Refills: 0 | Status: COMPLETED | OUTPATIENT
Start: 2022-09-07 | End: 2022-09-07

## 2022-09-07 RX ADMIN — Medication 25 MILLIGRAM(S): at 17:33

## 2022-09-07 RX ADMIN — AMLODIPINE BESYLATE 5 MILLIGRAM(S): 2.5 TABLET ORAL at 05:30

## 2022-09-07 RX ADMIN — HEPARIN SODIUM 5000 UNIT(S): 5000 INJECTION INTRAVENOUS; SUBCUTANEOUS at 21:27

## 2022-09-07 RX ADMIN — HEPARIN SODIUM 5000 UNIT(S): 5000 INJECTION INTRAVENOUS; SUBCUTANEOUS at 13:54

## 2022-09-07 RX ADMIN — HEPARIN SODIUM 5000 UNIT(S): 5000 INJECTION INTRAVENOUS; SUBCUTANEOUS at 05:31

## 2022-09-07 RX ADMIN — AMLODIPINE BESYLATE 10 MILLIGRAM(S): 2.5 TABLET ORAL at 13:53

## 2022-09-07 RX ADMIN — Medication 25 MILLIGRAM(S): at 05:30

## 2022-09-07 NOTE — PROVIDER CONTACT NOTE (OTHER) - DATE AND TIME:
06-Sep-2022 05:43
07-Sep-2022 00:00
04-Sep-2022 23:34
05-Sep-2022 01:10
06-Sep-2022 20:00
07-Sep-2022 04:00
06-Sep-2022 08:22

## 2022-09-07 NOTE — PROGRESS NOTE ADULT - SUBJECTIVE AND OBJECTIVE BOX
DATE OF SERVICE: 09-07-22    Patient denies chest pain or shortness of breath.   Review of symptoms otherwise negative.    MEDICATIONS:  acetaminophen     Tablet .. 650 milliGRAM(s) Oral every 6 hours PRN  ALBUTerol    90 MICROgram(s) HFA Inhaler 2 Puff(s) Inhalation every 4 hours PRN  amLODIPine   Tablet 10 milliGRAM(s) Oral daily  chlorthalidone 25 milliGRAM(s) Oral daily  heparin   Injectable 5000 Unit(s) SubCutaneous every 8 hours  LORazepam     Tablet 0.5 milliGRAM(s) Oral three times a day PRN      LABS:                        15.6   5.07  )-----------( 221      ( 07 Sep 2022 07:10 )             47.8       Hemoglobin: 15.6 g/dL (09-07 @ 07:10)  Hemoglobin: 15.3 g/dL (09-06 @ 08:34)  Hemoglobin: 13.7 g/dL (09-04 @ 17:31)      09-07    142  |  106  |  20  ----------------------------<  97  3.8   |  21<L>  |  1.28    Ca    9.3      07 Sep 2022 07:10  Phos  2.4     09-07  Mg     2.20     09-07      Creatinine Trend: 1.28<--, 1.15<--, 1.43<--    COAGS:     CARDIAC MARKERS ( 06 Sep 2022 08:34 )  x     / x     / 119 U/L / x     / x            PHYSICAL EXAM:  T(C): 37 (09-07-22 @ 09:23), Max: 37 (09-07-22 @ 09:23)  HR: 89 (09-07-22 @ 09:23) (67 - 92)  BP: 158/97 (09-07-22 @ 09:23) (139/93 - 164/92)  RR: 19 (09-07-22 @ 09:23) (17 - 19)  SpO2: 98% (09-07-22 @ 09:23) (98% - 100%)  Wt(kg): --    I&O's Summary    06 Sep 2022 07:01  -  07 Sep 2022 07:00  --------------------------------------------------------  IN: 300 mL / OUT: 575 mL / NET: -275 mL      General: Well nourished in no acute distress. Alert and Oriented * 3.   Head: Normocephalic and atraumatic.   Neck: No JVD. No bruits. Supple. Does not appear to be enlarged.   Cardiovascular: + S1,S2 ; RRR Soft systolic murmur at the left lower sternal border. No rubs noted.    Lungs: CTA b/l. No rhonchi, rales or wheezes.   Abdomen: + BS, soft. Non tender. Non distended. No rebound. No guarding.   Extremities: No clubbing/cyanosis/edema.   Neurologic: Moves all four extremities. Full range of motion.   Skin: Warm and moist. The patient's skin has normal elasticity and good skin turgor.   Psychiatric: Appropriate mood and affect.  Musculoskeletal: Normal range of motion, normal strength       ELEMETRY: SR	      ECG:  	NSR  LVH, PVC    < from: CT Angio Chest Aorta w/wo IV Cont (09.04.22 @ 20:02) >    IMPRESSION:    No aortic dissection.  Hepatomegaly. Hepatic steatosis.    < end of copied text >      ASSESSMENT/PLAN: 	Pt is a 61 y/o male with pmhx of HTN and anxiety (on no medications) who presents to the ER for elevated blood pressure in the setting of 5 days of feeling fatigued, fevers/chills and neck pain radiating down his back and R arm.    --Pt is COVID +  --CTA chest ruled out aortic dissection  -- consult neuro for numbness and tingling in right  - BP still elevated Chlorthalidone switched this AM, uptitrate Amlodipine to 10 mg, if blood pressure continues to be uncontrolled will check urine for proten and consider starting ACE  - ischemic work-up as outpatient as well as outpatient sleep study    José Miguel Piper MD  Pager: [phone]          DATE OF SERVICE: 09-07-22    Patient denies chest pain or shortness of breath.   Review of symptoms otherwise negative.    MEDICATIONS:  acetaminophen     Tablet .. 650 milliGRAM(s) Oral every 6 hours PRN  ALBUTerol    90 MICROgram(s) HFA Inhaler 2 Puff(s) Inhalation every 4 hours PRN  amLODIPine   Tablet 10 milliGRAM(s) Oral daily  chlorthalidone 25 milliGRAM(s) Oral daily  heparin   Injectable 5000 Unit(s) SubCutaneous every 8 hours  LORazepam     Tablet 0.5 milliGRAM(s) Oral three times a day PRN      LABS:                        15.6   5.07  )-----------( 221      ( 07 Sep 2022 07:10 )             47.8       Hemoglobin: 15.6 g/dL (09-07 @ 07:10)  Hemoglobin: 15.3 g/dL (09-06 @ 08:34)  Hemoglobin: 13.7 g/dL (09-04 @ 17:31)      09-07    142  |  106  |  20  ----------------------------<  97  3.8   |  21<L>  |  1.28    Ca    9.3      07 Sep 2022 07:10  Phos  2.4     09-07  Mg     2.20     09-07      Creatinine Trend: 1.28<--, 1.15<--, 1.43<--    COAGS:     CARDIAC MARKERS ( 06 Sep 2022 08:34 )  x     / x     / 119 U/L / x     / x            PHYSICAL EXAM:  T(C): 37 (09-07-22 @ 09:23), Max: 37 (09-07-22 @ 09:23)  HR: 89 (09-07-22 @ 09:23) (67 - 92)  BP: 158/97 (09-07-22 @ 09:23) (139/93 - 164/92)  RR: 19 (09-07-22 @ 09:23) (17 - 19)  SpO2: 98% (09-07-22 @ 09:23) (98% - 100%)  Wt(kg): --    I&O's Summary    06 Sep 2022 07:01  -  07 Sep 2022 07:00  --------------------------------------------------------  IN: 300 mL / OUT: 575 mL / NET: -275 mL      General: Well nourished in no acute distress. Alert and Oriented * 3.   Head: Normocephalic and atraumatic.   Neck: No JVD. No bruits. Supple. Does not appear to be enlarged.   Cardiovascular: + S1,S2 ; RRR Soft systolic murmur at the left lower sternal border. No rubs noted.    Lungs: CTA b/l. No rhonchi, rales or wheezes.   Abdomen: + BS, soft. Non tender. Non distended. No rebound. No guarding.   Extremities: No clubbing/cyanosis/edema.   Neurologic: Moves all four extremities. Full range of motion.   Skin: Warm and moist. The patient's skin has normal elasticity and good skin turgor.   Psychiatric: Appropriate mood and affect.  Musculoskeletal: Normal range of motion, normal strength       ELEMETRY: SR	      ECG:  	NSR  LVH, PVC    < from: CT Angio Chest Aorta w/wo IV Cont (09.04.22 @ 20:02) >    IMPRESSION:    No aortic dissection.  Hepatomegaly. Hepatic steatosis.    < end of copied text >      ASSESSMENT/PLAN: 	Pt is a 61 y/o male with pmhx of HTN and anxiety (on no medications) who presents to the ER for elevated blood pressure in the setting of 5 days of feeling fatigued, fevers/chills and neck pain radiating down his back and R arm.    --Pt is COVID +  --CTA chest ruled out aortic dissection  -- consult neuro for numbness and tingling in right  - BP still elevated Chlorthalidone switched this AM, uptitrate Amlodipine to 10 mg, if blood pressure continues to be uncontrolled will check urine for proten and consider starting ACE  - ischemic work-up as outpatient as well as outpatient sleep study    José Miguel Piper MD  Pager: [phone]          DATE OF SERVICE: 09-07-22    Patient denies chest pain or shortness of breath.   Review of symptoms otherwise negative.    MEDICATIONS:  acetaminophen     Tablet .. 650 milliGRAM(s) Oral every 6 hours PRN  ALBUTerol    90 MICROgram(s) HFA Inhaler 2 Puff(s) Inhalation every 4 hours PRN  amLODIPine   Tablet 10 milliGRAM(s) Oral daily  chlorthalidone 25 milliGRAM(s) Oral daily  heparin   Injectable 5000 Unit(s) SubCutaneous every 8 hours  LORazepam     Tablet 0.5 milliGRAM(s) Oral three times a day PRN      LABS:                        15.6   5.07  )-----------( 221      ( 07 Sep 2022 07:10 )             47.8       Hemoglobin: 15.6 g/dL (09-07 @ 07:10)  Hemoglobin: 15.3 g/dL (09-06 @ 08:34)  Hemoglobin: 13.7 g/dL (09-04 @ 17:31)      09-07    142  |  106  |  20  ----------------------------<  97  3.8   |  21<L>  |  1.28    Ca    9.3      07 Sep 2022 07:10  Phos  2.4     09-07  Mg     2.20     09-07      Creatinine Trend: 1.28<--, 1.15<--, 1.43<--    COAGS:     CARDIAC MARKERS ( 06 Sep 2022 08:34 )  x     / x     / 119 U/L / x     / x            PHYSICAL EXAM:  T(C): 37 (09-07-22 @ 09:23), Max: 37 (09-07-22 @ 09:23)  HR: 89 (09-07-22 @ 09:23) (67 - 92)  BP: 158/97 (09-07-22 @ 09:23) (139/93 - 164/92)  RR: 19 (09-07-22 @ 09:23) (17 - 19)  SpO2: 98% (09-07-22 @ 09:23) (98% - 100%)  Wt(kg): --    I&O's Summary    06 Sep 2022 07:01  -  07 Sep 2022 07:00  --------------------------------------------------------  IN: 300 mL / OUT: 575 mL / NET: -275 mL      General: Well nourished in no acute distress. Alert and Oriented * 3.   Head: Normocephalic and atraumatic.   Neck: No JVD. No bruits. Supple. Does not appear to be enlarged.   Cardiovascular: + S1,S2 ; RRR Soft systolic murmur at the left lower sternal border. No rubs noted.    Lungs: CTA b/l. No rhonchi, rales or wheezes.   Abdomen: + BS, soft. Non tender. Non distended. No rebound. No guarding.   Extremities: No clubbing/cyanosis/edema.   Neurologic: Moves all four extremities. Full range of motion.   Skin: Warm and moist. The patient's skin has normal elasticity and good skin turgor.   Psychiatric: Appropriate mood and affect.  Musculoskeletal: Normal range of motion, normal strength       ELEMETRY: SR	      ECG:  	NSR  LVH, PVC    < from: CT Angio Chest Aorta w/wo IV Cont (09.04.22 @ 20:02) >    IMPRESSION:    No aortic dissection.  Hepatomegaly. Hepatic steatosis.    < end of copied text >      ASSESSMENT/PLAN: 	Pt is a 61 y/o male with pmhx of HTN and anxiety (on no medications) who presents to the ER for elevated blood pressure in the setting of 5 days of feeling fatigued, fevers/chills and neck pain radiating down his back and R arm.    --Pt is COVID +  --CTA chest ruled out aortic dissection  -- consult neuro for numbness and tingling in right  - BP still elevated Chlorthalidone switched this AM, uptitrate Amlodipine to 10 mg, if blood pressure continues to be uncontrolled will check urine for proten and consider starting ACE  - ischemic work-up as outpatient as well as outpatient sleep study    José Miguel Piper MD  Pager: [phone]

## 2022-09-07 NOTE — PROVIDER CONTACT NOTE (OTHER) - ACTION/TREATMENT ORDERED:
ACP [facility] made aware.  No interventions ordered at this time.  Will continue to monitor. ACP [facility] made aware.  No interventions ordered at this time.  Will continue to monitor. ACP [facility] made aware.  No interventions ordered at this time.  Will continue to monitor.

## 2022-09-07 NOTE — PROVIDER CONTACT NOTE (OTHER) - NAME OF MD/NP/PA/DO NOTIFIED:
Theodore Urias
ACP Nia Meier
ACP Nia Meier
ACP Lisseth Walter
Ashish Chaney
Theodore Urias
ACP Nia Meier

## 2022-09-07 NOTE — PROVIDER CONTACT NOTE (OTHER) - ACTION/TREATMENT ORDERED:
[facility] made aware.  Will continue to monitor. [facility] made aware.  Will continue to monitor. [facility] made aware.  Will continue to monitor.

## 2022-09-07 NOTE — PROGRESS NOTE ADULT - SUBJECTIVE AND OBJECTIVE BOX
EP Attending    HISTORY OF PRESENT ILLNESS: HPI:  Pt is a 63 y/o male with pmhx of HTN and anxiety (on no medications) who presents to the ER for elevated blood pressure in the setting of 5 days of feeling fatigue and neck pain radiating down his back and R arm. HE reports that it might have started after he lifted a heavy trashbag. No weak or tingling sensation and no worsening on exertion. He denies any chest pain nor shortness of breath and denies any cardiac history. He does endorse mild cough and post nasal drip, otherwise no headache nor fever. No abd pain, diarrhea or dysuria. He has been having a good appetite. He does endorse feeling more anxious and slightly depressed recently due to being alone but denies any suicidal ideation. He reports he was on meds in the past but not recently. In the Er pt was found hypertensive and given amlodipine and hydralazine. Also given a dose of ativan for anxiety.  (04 Sep 2022 22:29)    9/5-  patient has chronic anxiety and tends to avoid medical care. has palpitations on a near-daily basis, but no fainting.  has nonspecific chest discomfort radiating into his arms and neck, but is not sure if this is angina, since it can happen at rest.  no prior known MI or stroke.  no prior known vascular disease.  he presented in hypertensive urgency vs emergency, /110s with chest/neck discomfort.  No dissection on CTA.  sinus rhythm on telemetry. He is essentially a new start to oral antihypertensives after lapsed care for a few years.    A 10 pt ROS is otherwise negative.  9/6- feeling well, able to lie flat, no significant shortness of breath.  no angina or palpitations today.  Date of service 9/7- resting comfortably. no new complaints.    acetaminophen     Tablet .. 650 milliGRAM(s) Oral every 6 hours PRN  ALBUTerol    90 MICROgram(s) HFA Inhaler 2 Puff(s) Inhalation every 4 hours PRN  amLODIPine   Tablet 10 milliGRAM(s) Oral daily  chlorthalidone 25 milliGRAM(s) Oral daily  heparin   Injectable 5000 Unit(s) SubCutaneous every 8 hours  LORazepam     Tablet 0.5 milliGRAM(s) Oral three times a day PRN                        15.6   5.07  )-----------( 221      ( 07 Sep 2022 07:10 )             47.8       09-07    142  |  106  |  20  ----------------------------<  97  3.8   |  21<L>  |  1.28    Ca    9.3      07 Sep 2022 07:10  Phos  2.4     09-07  Mg     2.20     09-07        CARDIAC MARKERS ( 06 Sep 2022 08:34 )  x     / x     / 119 U/L / x     / x        T(C): 36.6 (09-07-22 @ 13:50), Max: 37 (09-07-22 @ 09:23)  HR: 88 (09-07-22 @ 13:50) (72 - 92)  BP: 155/89 (09-07-22 @ 13:50) (146/84 - 164/92)  RR: 18 (09-07-22 @ 13:50) (17 - 19)  SpO2: 98% (09-07-22 @ 13:50) (98% - 100%)  Wt(kg): --    I&O's Summary    06 Sep 2022 07:01  -  07 Sep 2022 07:00  --------------------------------------------------------  IN: 300 mL / OUT: 575 mL / NET: -275 mL        General: Well nourished, no acute distress, alert and oriented x 3  Head: normocephalic, no trauma  Neck: no JVD, no bruit, supple, not enlarged  CV: S1S2, no S3, regular rate, rhythm is SINUS, no murmurs.    Lungs: clear BL, no rales or wheezes  Abdomen: bowel sounds +, soft, nontender, nondistended  Extremities: no clubbing, cyanosis or edema  Neuro: Moves all 4 extremities, sensation intact x 4 extremities  Skin: warm and moist, normal turgor  Psych: Mood and affect are appropriate for circumstances  MSK: normal range of motion and strength x4 extremities.      TELEMETRY: NSR, no AFib.    ECG:  	NSR , anterior and lateral T wave inversions, PAc's.    < from: CT Angio Chest Aorta w/wo IV Cont (09.04.22 @ 20:02) >    IMPRESSION:    No aortic dissection.  Hepatomegaly. Hepatic steatosis.    < end of copied text >      ASSESSMENT/PLAN: 	Pt is a 63 y/o male here with shortness of breath, fatigue, atypical chest pain.  Found to have COVID.  Lapsed care for hypertension.  Palpitations.  Anxiety disorder?    --Expected pulmonary and systemic symptoms from COVID  --Echo reassuring with normal LVEF.  --Antihypertensives per general cardiology.   --Telemetry monitoring for AFib or SVT.  Would defer invasive management of any arrhythmia until after cleared from COVID isolation.  --Maintain K 4-4.5, Mg 2 via oral route.  --If no arrhythmia identified in hospital, will recommend ambulatory event monitoring.    Jean Jo M.D.  Cardiac Electrophysiology  [phone]     EP Attending    HISTORY OF PRESENT ILLNESS: HPI:  Pt is a 63 y/o male with pmhx of HTN and anxiety (on no medications) who presents to the ER for elevated blood pressure in the setting of 5 days of feeling fatigue and neck pain radiating down his back and R arm. HE reports that it might have started after he lifted a heavy trashbag. No weak or tingling sensation and no worsening on exertion. He denies any chest pain nor shortness of breath and denies any cardiac history. He does endorse mild cough and post nasal drip, otherwise no headache nor fever. No abd pain, diarrhea or dysuria. He has been having a good appetite. He does endorse feeling more anxious and slightly depressed recently due to being alone but denies any suicidal ideation. He reports he was on meds in the past but not recently. In the Er pt was found hypertensive and given amlodipine and hydralazine. Also given a dose of ativan for anxiety.  (04 Sep 2022 22:29)    9/5-  patient has chronic anxiety and tends to avoid medical care. has palpitations on a near-daily basis, but no fainting.  has nonspecific chest discomfort radiating into his arms and neck, but is not sure if this is angina, since it can happen at rest.  no prior known MI or stroke.  no prior known vascular disease.  he presented in hypertensive urgency vs emergency, /110s with chest/neck discomfort.  No dissection on CTA.  sinus rhythm on telemetry. He is essentially a new start to oral antihypertensives after lapsed care for a few years.    A 10 pt ROS is otherwise negative.  9/6- feeling well, able to lie flat, no significant shortness of breath.  no angina or palpitations today.  Date of service 9/7- resting comfortably. no new complaints.    acetaminophen     Tablet .. 650 milliGRAM(s) Oral every 6 hours PRN  ALBUTerol    90 MICROgram(s) HFA Inhaler 2 Puff(s) Inhalation every 4 hours PRN  amLODIPine   Tablet 10 milliGRAM(s) Oral daily  chlorthalidone 25 milliGRAM(s) Oral daily  heparin   Injectable 5000 Unit(s) SubCutaneous every 8 hours  LORazepam     Tablet 0.5 milliGRAM(s) Oral three times a day PRN                        15.6   5.07  )-----------( 221      ( 07 Sep 2022 07:10 )             47.8       09-07    142  |  106  |  20  ----------------------------<  97  3.8   |  21<L>  |  1.28    Ca    9.3      07 Sep 2022 07:10  Phos  2.4     09-07  Mg     2.20     09-07        CARDIAC MARKERS ( 06 Sep 2022 08:34 )  x     / x     / 119 U/L / x     / x        T(C): 36.6 (09-07-22 @ 13:50), Max: 37 (09-07-22 @ 09:23)  HR: 88 (09-07-22 @ 13:50) (72 - 92)  BP: 155/89 (09-07-22 @ 13:50) (146/84 - 164/92)  RR: 18 (09-07-22 @ 13:50) (17 - 19)  SpO2: 98% (09-07-22 @ 13:50) (98% - 100%)  Wt(kg): --    I&O's Summary    06 Sep 2022 07:01  -  07 Sep 2022 07:00  --------------------------------------------------------  IN: 300 mL / OUT: 575 mL / NET: -275 mL        General: Well nourished, no acute distress, alert and oriented x 3  Head: normocephalic, no trauma  Neck: no JVD, no bruit, supple, not enlarged  CV: S1S2, no S3, regular rate, rhythm is SINUS, no murmurs.    Lungs: clear BL, no rales or wheezes  Abdomen: bowel sounds +, soft, nontender, nondistended  Extremities: no clubbing, cyanosis or edema  Neuro: Moves all 4 extremities, sensation intact x 4 extremities  Skin: warm and moist, normal turgor  Psych: Mood and affect are appropriate for circumstances  MSK: normal range of motion and strength x4 extremities.      TELEMETRY: NSR, no AFib.    ECG:  	NSR , anterior and lateral T wave inversions, PAc's.    < from: CT Angio Chest Aorta w/wo IV Cont (09.04.22 @ 20:02) >    IMPRESSION:    No aortic dissection.  Hepatomegaly. Hepatic steatosis.    < end of copied text >      ASSESSMENT/PLAN: 	Pt is a 63 y/o male here with shortness of breath, fatigue, atypical chest pain.  Found to have COVID.  Lapsed care for hypertension.  Palpitations.  Anxiety disorder?    --Expected pulmonary and systemic symptoms from COVID  --Echo reassuring with normal LVEF.  --Antihypertensives per general cardiology.   --Telemetry monitoring for AFib or SVT.  Would defer invasive management of any arrhythmia until after cleared from COVID isolation.  --Maintain K 4-4.5, Mg 2 via oral route.  --If no arrhythmia identified in hospital, will recommend ambulatory event monitoring.    Jean Jo M.D.  Cardiac Electrophysiology  [phone]     EP Attending    HISTORY OF PRESENT ILLNESS: HPI:  Pt is a 63 y/o male with pmhx of HTN and anxiety (on no medications) who presents to the ER for elevated blood pressure in the setting of 5 days of feeling fatigue and neck pain radiating down his back and R arm. HE reports that it might have started after he lifted a heavy trashbag. No weak or tingling sensation and no worsening on exertion. He denies any chest pain nor shortness of breath and denies any cardiac history. He does endorse mild cough and post nasal drip, otherwise no headache nor fever. No abd pain, diarrhea or dysuria. He has been having a good appetite. He does endorse feeling more anxious and slightly depressed recently due to being alone but denies any suicidal ideation. He reports he was on meds in the past but not recently. In the Er pt was found hypertensive and given amlodipine and hydralazine. Also given a dose of ativan for anxiety.  (04 Sep 2022 22:29)    9/5-  patient has chronic anxiety and tends to avoid medical care. has palpitations on a near-daily basis, but no fainting.  has nonspecific chest discomfort radiating into his arms and neck, but is not sure if this is angina, since it can happen at rest.  no prior known MI or stroke.  no prior known vascular disease.  he presented in hypertensive urgency vs emergency, /110s with chest/neck discomfort.  No dissection on CTA.  sinus rhythm on telemetry. He is essentially a new start to oral antihypertensives after lapsed care for a few years.    A 10 pt ROS is otherwise negative.  9/6- feeling well, able to lie flat, no significant shortness of breath.  no angina or palpitations today.  Date of service 9/7- resting comfortably. no new complaints.    acetaminophen     Tablet .. 650 milliGRAM(s) Oral every 6 hours PRN  ALBUTerol    90 MICROgram(s) HFA Inhaler 2 Puff(s) Inhalation every 4 hours PRN  amLODIPine   Tablet 10 milliGRAM(s) Oral daily  chlorthalidone 25 milliGRAM(s) Oral daily  heparin   Injectable 5000 Unit(s) SubCutaneous every 8 hours  LORazepam     Tablet 0.5 milliGRAM(s) Oral three times a day PRN                        15.6   5.07  )-----------( 221      ( 07 Sep 2022 07:10 )             47.8       09-07    142  |  106  |  20  ----------------------------<  97  3.8   |  21<L>  |  1.28    Ca    9.3      07 Sep 2022 07:10  Phos  2.4     09-07  Mg     2.20     09-07        CARDIAC MARKERS ( 06 Sep 2022 08:34 )  x     / x     / 119 U/L / x     / x        T(C): 36.6 (09-07-22 @ 13:50), Max: 37 (09-07-22 @ 09:23)  HR: 88 (09-07-22 @ 13:50) (72 - 92)  BP: 155/89 (09-07-22 @ 13:50) (146/84 - 164/92)  RR: 18 (09-07-22 @ 13:50) (17 - 19)  SpO2: 98% (09-07-22 @ 13:50) (98% - 100%)  Wt(kg): --    I&O's Summary    06 Sep 2022 07:01  -  07 Sep 2022 07:00  --------------------------------------------------------  IN: 300 mL / OUT: 575 mL / NET: -275 mL        General: Well nourished, no acute distress, alert and oriented x 3  Head: normocephalic, no trauma  Neck: no JVD, no bruit, supple, not enlarged  CV: S1S2, no S3, regular rate, rhythm is SINUS, no murmurs.    Lungs: clear BL, no rales or wheezes  Abdomen: bowel sounds +, soft, nontender, nondistended  Extremities: no clubbing, cyanosis or edema  Neuro: Moves all 4 extremities, sensation intact x 4 extremities  Skin: warm and moist, normal turgor  Psych: Mood and affect are appropriate for circumstances  MSK: normal range of motion and strength x4 extremities.      TELEMETRY: NSR, no AFib.    ECG:  	NSR , anterior and lateral T wave inversions, PAc's.    < from: CT Angio Chest Aorta w/wo IV Cont (09.04.22 @ 20:02) >    IMPRESSION:    No aortic dissection.  Hepatomegaly. Hepatic steatosis.    < end of copied text >      ASSESSMENT/PLAN: 	Pt is a 63 y/o male here with shortness of breath, fatigue, atypical chest pain.  Found to have COVID.  Lapsed care for hypertension.  Palpitations.  Anxiety disorder?    --Expected pulmonary and systemic symptoms from COVID  --Echo reassuring with normal LVEF.  --Antihypertensives per general cardiology.   --Telemetry monitoring for AFib or SVT.  Would defer invasive management of any arrhythmia until after cleared from COVID isolation.  --Maintain K 4-4.5, Mg 2 via oral route.  --If no arrhythmia identified in hospital, will recommend ambulatory event monitoring.    Jean Jo M.D.  Cardiac Electrophysiology  [phone]

## 2022-09-07 NOTE — PROVIDER CONTACT NOTE (OTHER) - BACKGROUND
Admit Diagnosis) Primary hypertension  (PMH) Hypertension  (PMH) Anxiety
pt admitted for high BP and positive COVID
Admit Diagnosis) Primary hypertension  (PMH) Hypertension  (PMH) Anxiety  (Principal DC/DX) Hypertension
Patient has a history of hypertension
pt admitted for high BP and positive COVID
Admit Diagnosis) Primary hypertension  (PMH) Hypertension  (PMH) Anxiety

## 2022-09-07 NOTE — CONSULT NOTE ADULT - SUBJECTIVE AND OBJECTIVE BOX
HPI:  62M HTN and anxiety.   Here 9/4/22 for elevated blood pressure and 5 days of fatigue and vague neck pain radiating down his back and R arm.   Onset after lifting a heavy trashbag.   No weak or tingling sensation and no worsening on exertion. He denies any chest pain nor shortness of breath and denies any cardiac history. He does endorse mild cough and post nasal drip, otherwise no headache nor fever. No abd pain, diarrhea or dysuria. He has been having a good appetite. He does endorse feeling more anxious and slightly depressed recently due to being alone but denies any suicidal ideation. He reports he was on meds in the past but not recently. In the Er pt was found hypertensive and given amlodipine and hydralazine. Also given a dose of ativan for anxiety.  (04 Sep 2022 22:29)    CTA without aortic dissection.   COVID positive but afebrile with clear lungs and no hypoxia.       PAST MEDICAL & SURGICAL HISTORY:  Hypertension      Anxiety      No significant past surgical history          Allergies    No Known Allergies    Intolerances        ANTIMICROBIALS:      OTHER MEDS:  acetaminophen     Tablet .. 650 milliGRAM(s) Oral every 6 hours PRN  ALBUTerol    90 MICROgram(s) HFA Inhaler 2 Puff(s) Inhalation every 4 hours PRN  amLODIPine   Tablet 10 milliGRAM(s) Oral daily  chlorthalidone 25 milliGRAM(s) Oral daily  heparin   Injectable 5000 Unit(s) SubCutaneous every 8 hours  LORazepam     Tablet 0.5 milliGRAM(s) Oral three times a day PRN      SOCIAL HISTORY:    FAMILY HISTORY:  FHx: coronary artery disease        ROS:  Unobtainable because:   All other systems negative   Constitutional: no fever, no chills  Eye: no vision changes  ENT: no sore throat, no rhinorrhea  Cardiovascular: no chest pain, no palpitation  Respiratory: no SOB, no cough  GI:  no abd pain, no vomiting, no diarrhea  urinary: no dysuria, no hematuria, no flank pain  musculoskeletal: no joint pain, no joint swelling  skin: no rash  neurology: no headache, no change in mental status  psych: no anxiety    Physical Exam:  General: awake, alert, non toxic  Head: atraumatic, normocephalic  Eyes: normal sclera and conjunctiva  ENT: no oropharyngeal lesions, no LAD, neck supple  Cardio: regular rate and rhythm   Respiratory: nonlabored on room air, clear bilaterally, no wheezing  abd: soft, bowel sounds present, not tender  : no suprapubic tenderness, no espinoza  Musculoskeletal: no joint swelling, no edema  Skin: no rash  vascular: no phlebitis  Neurologic: no focal deficits  psych: normal affect       Drug Dosing Weight  Height (cm): 167.6 (05 Sep 2022 04:09)  Weight (kg): 89.4 (05 Sep 2022 04:09)  BMI (kg/m2): 31.8 (05 Sep 2022 04:09)  BSA (m2): 1.99 (05 Sep 2022 04:09)    Vital Signs Last 24 Hrs  T(F): 98.6 (09-07-22 @ 09:23), Max: 99.6 (09-04-22 @ 15:43)    Vital Signs Last 24 Hrs  HR: 89 (09-07-22 @ 09:23) (67 - 92)  BP: 158/97 (09-07-22 @ 09:23) (139/93 - 164/92)  RR: 19 (09-07-22 @ 09:23)  SpO2: 98% (09-07-22 @ 09:23) (98% - 100%)  Wt(kg): --                          15.6   5.07  )-----------( 221      ( 07 Sep 2022 07:10 )             47.8       09-07    142  |  106  |  20  ----------------------------<  97  3.8   |  21<L>  |  1.28    Ca    9.3      07 Sep 2022 07:10  Phos  2.4     09-07  Mg     2.20     09-07            MICROBIOLOGY:  Flu With COVID-19 By RAÚL (09.04.22 @ 16:20)   SARS-CoV-2 Result: Detected  Influenza A Result: [facility]   Influenza B Result: [facility]   Resp Syn Virus Result: [facility]       RADIOLOGY:  Images below reviewed personally  CT Angio Chest Aorta w/wo IV Cont (09.04.22 @ 20:02)   No aortic dissection.  Hepatomegaly. Hepatic steatosis.   HPI:  62M HTN and anxiety.   Here 9/4/22 for elevated blood pressure and 5 days of fatigue and vague neck pain radiating down his back and R arm.   Onset after lifting a heavy trashbag.   No weak or tingling sensation and no worsening on exertion. He denies any chest pain nor shortness of breath and denies any cardiac history. He does endorse mild cough and post nasal drip, otherwise no headache nor fever. No abd pain, diarrhea or dysuria. He has been having a good appetite. He does endorse feeling more anxious and slightly depressed recently due to being alone but denies any suicidal ideation. He reports he was on meds in the past but not recently. In the Er pt was found hypertensive and given amlodipine and hydralazine. Also given a dose of ativan for anxiety.  (04 Sep 2022 22:29)    CTA without aortic dissection.   COVID positive but afebrile with clear lungs and no hypoxia.       PAST MEDICAL & SURGICAL HISTORY:  Hypertension      Anxiety      No significant past surgical history          Allergies    No Known Allergies    Intolerances        ANTIMICROBIALS:      OTHER MEDS:  acetaminophen     Tablet .. 650 milliGRAM(s) Oral every 6 hours PRN  ALBUTerol    90 MICROgram(s) HFA Inhaler 2 Puff(s) Inhalation every 4 hours PRN  amLODIPine   Tablet 10 milliGRAM(s) Oral daily  chlorthalidone 25 milliGRAM(s) Oral daily  heparin   Injectable 5000 Unit(s) SubCutaneous every 8 hours  LORazepam     Tablet 0.5 milliGRAM(s) Oral three times a day PRN      SOCIAL HISTORY:    FAMILY HISTORY:  FHx: coronary artery disease        ROS:  Unobtainable because:   All other systems negative   Constitutional: no fever, no chills  Eye: no vision changes  ENT: no sore throat, no rhinorrhea  Cardiovascular: no chest pain, no palpitation  Respiratory: no SOB, no cough  GI:  no abd pain, no vomiting, no diarrhea  urinary: no dysuria, no hematuria, no flank pain  musculoskeletal: no joint pain, no joint swelling  skin: no rash  neurology: no headache, no change in mental status  psych: no anxiety    Physical Exam:  General: awake, alert, non toxic  Head: atraumatic, normocephalic  Eyes: normal sclera and conjunctiva  ENT: no oropharyngeal lesions, no LAD, neck supple  Cardio: regular rate and rhythm   Respiratory: nonlabored on room air, clear bilaterally, no wheezing  abd: soft, bowel sounds present, not tender  : no suprapubic tenderness, no espinoza  Musculoskeletal: no joint swelling, no edema  Skin: no rash  vascular: no phlebitis  Neurologic: no focal deficits  psych: normal affect       Drug Dosing Weight  Height (cm): 167.6 (05 Sep 2022 04:09)  Weight (kg): 89.4 (05 Sep 2022 04:09)  BMI (kg/m2): 31.8 (05 Sep 2022 04:09)  BSA (m2): 1.99 (05 Sep 2022 04:09)    Vital Signs Last 24 Hrs  T(F): 98.6 (09-07-22 @ 09:23), Max: 99.6 (09-04-22 @ 15:43)    Vital Signs Last 24 Hrs  HR: 89 (09-07-22 @ 09:23) (67 - 92)  BP: 158/97 (09-07-22 @ 09:23) (139/93 - 164/92)  RR: 19 (09-07-22 @ 09:23)  SpO2: 98% (09-07-22 @ 09:23) (98% - 100%)  Wt(kg): --                          15.6   5.07  )-----------( 221      ( 07 Sep 2022 07:10 )             47.8       09-07    142  |  106  |  20  ----------------------------<  97  3.8   |  21<L>  |  1.28    Ca    9.3      07 Sep 2022 07:10  Phos  2.4     09-07  Mg     2.20     09-07            MICROBIOLOGY:  Flu With COVID-19 By RAÚL (09.04.22 @ 16:20)   SARS-CoV-2 Result: Detected  Influenza A Result: [facility]   Influenza B Result: [facility]   Resp Syn Virus Result: [facility]       RADIOLOGY:  Images below reviewed personally  CT Angio Chest Aorta w/wo IV Cont (09.04.22 @ 20:02)   No aortic dissection.  Hepatomegaly. Hepatic steatosis.   HPI:  62M HTN and anxiety.   Here 9/4/22 for elevated blood pressure and 5 days of fatigue and vague neck pain radiating down his back and R arm.   Onset after lifting a heavy trashbag.   No weak or tingling sensation and no worsening on exertion. He denies any chest pain nor shortness of breath and denies any cardiac history. He does endorse mild cough and post nasal drip, otherwise no headache nor fever. No abd pain, diarrhea or dysuria. He has been having a good appetite. He does endorse feeling more anxious and slightly depressed recently due to being alone but denies any suicidal ideation. He reports he was on meds in the past but not recently. In the Er pt was found hypertensive and given amlodipine and hydralazine. Also given a dose of ativan for anxiety.  (04 Sep 2022 22:29)    CTA without aortic dissection.   COVID positive but afebrile with clear lungs and no hypoxia.       PAST MEDICAL & SURGICAL HISTORY:  Hypertension      Anxiety      No significant past surgical history          Allergies    No Known Allergies    Intolerances        ANTIMICROBIALS:      OTHER MEDS:  acetaminophen     Tablet .. 650 milliGRAM(s) Oral every 6 hours PRN  ALBUTerol    90 MICROgram(s) HFA Inhaler 2 Puff(s) Inhalation every 4 hours PRN  amLODIPine   Tablet 10 milliGRAM(s) Oral daily  chlorthalidone 25 milliGRAM(s) Oral daily  heparin   Injectable 5000 Unit(s) SubCutaneous every 8 hours  LORazepam     Tablet 0.5 milliGRAM(s) Oral three times a day PRN      SOCIAL HISTORY:    FAMILY HISTORY:  FHx: coronary artery disease        ROS:  Unobtainable because:   All other systems negative   Constitutional: no fever, no chills  Eye: no vision changes  ENT: no sore throat, no rhinorrhea  Cardiovascular: no chest pain, no palpitation  Respiratory: no SOB, no cough  GI:  no abd pain, no vomiting, no diarrhea  urinary: no dysuria, no hematuria, no flank pain  musculoskeletal: no joint pain, no joint swelling  skin: no rash  neurology: no headache, no change in mental status  psych: no anxiety    Physical Exam:  General: awake, alert, non toxic  Head: atraumatic, normocephalic  Eyes: normal sclera and conjunctiva  ENT: no oropharyngeal lesions, no LAD, neck supple  Cardio: regular rate and rhythm   Respiratory: nonlabored on room air, clear bilaterally, no wheezing  abd: soft, bowel sounds present, not tender  : no suprapubic tenderness, no espinoza  Musculoskeletal: no joint swelling, no edema  Skin: no rash  vascular: no phlebitis  Neurologic: no focal deficits  psych: normal affect       Drug Dosing Weight  Height (cm): 167.6 (05 Sep 2022 04:09)  Weight (kg): 89.4 (05 Sep 2022 04:09)  BMI (kg/m2): 31.8 (05 Sep 2022 04:09)  BSA (m2): 1.99 (05 Sep 2022 04:09)    Vital Signs Last 24 Hrs  T(F): 98.6 (09-07-22 @ 09:23), Max: 99.6 (09-04-22 @ 15:43)    Vital Signs Last 24 Hrs  HR: 89 (09-07-22 @ 09:23) (67 - 92)  BP: 158/97 (09-07-22 @ 09:23) (139/93 - 164/92)  RR: 19 (09-07-22 @ 09:23)  SpO2: 98% (09-07-22 @ 09:23) (98% - 100%)  Wt(kg): --                          15.6   5.07  )-----------( 221      ( 07 Sep 2022 07:10 )             47.8       09-07    142  |  106  |  20  ----------------------------<  97  3.8   |  21<L>  |  1.28    Ca    9.3      07 Sep 2022 07:10  Phos  2.4     09-07  Mg     2.20     09-07            MICROBIOLOGY:  Flu With COVID-19 By RAÚL (09.04.22 @ 16:20)   SARS-CoV-2 Result: Detected  Influenza A Result: [facility]   Influenza B Result: [facility]   Resp Syn Virus Result: [facility]       RADIOLOGY:  Images below reviewed personally  CT Angio Chest Aorta w/wo IV Cont (09.04.22 @ 20:02)   No aortic dissection.  Hepatomegaly. Hepatic steatosis.   HPI:  62M HTN and anxiety.   Here 9/4/22 five days of neck pain radiating down his back and right arm.   Onset after lifting a heavy trashbag.   Worse with movement, especially turning his head to the right.   Noted elevated BP. CTA here negative for aortic dissection.   Also noted to be COVID positive. Had some cough but now it's minimal. No dyspnea, fevers or chills, sore throat or rhinorrhea.   Vaccinated - two doses in 2021.       PAST MEDICAL & SURGICAL HISTORY:  Hypertension      Anxiety      No significant past surgical history          Allergies    No Known Allergies    Intolerances        ANTIMICROBIALS:      OTHER MEDS:  acetaminophen     Tablet .. 650 milliGRAM(s) Oral every 6 hours PRN  ALBUTerol    90 MICROgram(s) HFA Inhaler 2 Puff(s) Inhalation every 4 hours PRN  amLODIPine   Tablet 10 milliGRAM(s) Oral daily  chlorthalidone 25 milliGRAM(s) Oral daily  heparin   Injectable 5000 Unit(s) SubCutaneous every 8 hours  LORazepam     Tablet 0.5 milliGRAM(s) Oral three times a day PRN      SOCIAL HISTORY: Nonsmoker     FAMILY HISTORY:  FHx: coronary artery disease    ROS:  All other systems negative   Constitutional: no fever, no chills  Eye: no vision changes  ENT: no sore throat, no rhinorrhea  Cardiovascular: no chest pain, no palpitation  Respiratory: per HPI   GI:  no abd pain, no vomiting, no diarrhea  urinary: no dysuria, no hematuria, no flank pain  musculoskeletal: per HPI   skin: no rash  neurology: no headache, no change in mental status  psych: no anxiety    Physical Exam:  General: awake, alert, non toxic  Head: atraumatic, normocephalic  Eyes: normal sclera and conjunctiva  ENT: no LAD, neck supple  Cardio: regular rate   Respiratory: nonlabored on room air  abd: soft, not tender  : no espinoza  Musculoskeletal: no joint swelling, no edema  Skin: no rash  vascular: no phlebitis  Neurologic: no focal deficits  psych: normal affect       Drug Dosing Weight  Height (cm): 167.6 (05 Sep 2022 04:09)  Weight (kg): 89.4 (05 Sep 2022 04:09)  BMI (kg/m2): 31.8 (05 Sep 2022 04:09)  BSA (m2): 1.99 (05 Sep 2022 04:09)    Vital Signs Last 24 Hrs  T(F): 98.6 (09-07-22 @ 09:23), Max: 99.6 (09-04-22 @ 15:43)    Vital Signs Last 24 Hrs  HR: 89 (09-07-22 @ 09:23) (67 - 92)  BP: 158/97 (09-07-22 @ 09:23) (139/93 - 164/92)  RR: 19 (09-07-22 @ 09:23)  SpO2: 98% (09-07-22 @ 09:23) (98% - 100%)  Wt(kg): --                          15.6   5.07  )-----------( 221      ( 07 Sep 2022 07:10 )             47.8       09-07    142  |  106  |  20  ----------------------------<  97  3.8   |  21<L>  |  1.28    Ca    9.3      07 Sep 2022 07:10  Phos  2.4     09-07  Mg     2.20     09-07            MICROBIOLOGY:  Flu With COVID-19 By RÚAL (09.04.22 @ 16:20)   SARS-CoV-2 Result: Detected  [facility]   [facility]   [facility]       RADIOLOGY:  Images below reviewed personally  CT Angio Chest Aorta w/wo IV Cont (09.04.22 @ 20:02)   No aortic dissection.  Hepatomegaly. Hepatic steatosis.   HPI:  62M HTN and anxiety.   Here 9/4/22 five days of neck pain radiating down his back and right arm.   Onset after lifting a heavy trashbag.   Worse with movement, especially turning his head to the right.   Noted elevated BP. CTA here negative for aortic dissection.   Also noted to be COVID positive. Had some cough but now it's minimal. No dyspnea, fevers or chills, sore throat or rhinorrhea.   Vaccinated - two doses in 2021.       PAST MEDICAL & SURGICAL HISTORY:  Hypertension      Anxiety      No significant past surgical history          Allergies    No Known Allergies    Intolerances        ANTIMICROBIALS:      OTHER MEDS:  acetaminophen     Tablet .. 650 milliGRAM(s) Oral every 6 hours PRN  ALBUTerol    90 MICROgram(s) HFA Inhaler 2 Puff(s) Inhalation every 4 hours PRN  amLODIPine   Tablet 10 milliGRAM(s) Oral daily  chlorthalidone 25 milliGRAM(s) Oral daily  heparin   Injectable 5000 Unit(s) SubCutaneous every 8 hours  LORazepam     Tablet 0.5 milliGRAM(s) Oral three times a day PRN      SOCIAL HISTORY: Nonsmoker     FAMILY HISTORY:  FHx: coronary artery disease    ROS:  All other systems negative   Constitutional: no fever, no chills  Eye: no vision changes  ENT: no sore throat, no rhinorrhea  Cardiovascular: no chest pain, no palpitation  Respiratory: per HPI   GI:  no abd pain, no vomiting, no diarrhea  urinary: no dysuria, no hematuria, no flank pain  musculoskeletal: per HPI   skin: no rash  neurology: no headache, no change in mental status  psych: no anxiety    Physical Exam:  General: awake, alert, non toxic  Head: atraumatic, normocephalic  Eyes: normal sclera and conjunctiva  ENT: no LAD, neck supple  Cardio: regular rate   Respiratory: nonlabored on room air  abd: soft, not tender  : no espinoza  Musculoskeletal: no joint swelling, no edema  Skin: no rash  vascular: no phlebitis  Neurologic: no focal deficits  psych: normal affect       Drug Dosing Weight  Height (cm): 167.6 (05 Sep 2022 04:09)  Weight (kg): 89.4 (05 Sep 2022 04:09)  BMI (kg/m2): 31.8 (05 Sep 2022 04:09)  BSA (m2): 1.99 (05 Sep 2022 04:09)    Vital Signs Last 24 Hrs  T(F): 98.6 (09-07-22 @ 09:23), Max: 99.6 (09-04-22 @ 15:43)    Vital Signs Last 24 Hrs  HR: 89 (09-07-22 @ 09:23) (67 - 92)  BP: 158/97 (09-07-22 @ 09:23) (139/93 - 164/92)  RR: 19 (09-07-22 @ 09:23)  SpO2: 98% (09-07-22 @ 09:23) (98% - 100%)  Wt(kg): --                          15.6   5.07  )-----------( 221      ( 07 Sep 2022 07:10 )             47.8       09-07    142  |  106  |  20  ----------------------------<  97  3.8   |  21<L>  |  1.28    Ca    9.3      07 Sep 2022 07:10  Phos  2.4     09-07  Mg     2.20     09-07            MICROBIOLOGY:  Flu With COVID-19 By RAÚL (09.04.22 @ 16:20)   SARS-CoV-2 Result: Detected  [facility]   [facility]   [facility]       RADIOLOGY:  Images below reviewed personally  CT Angio Chest Aorta w/wo IV Cont (09.04.22 @ 20:02)   No aortic dissection.  Hepatomegaly. Hepatic steatosis.   HPI:  62M HTN and anxiety.   Here 9/4/22 five days of neck pain radiating down his back and right arm.   Onset after lifting a heavy trashbag.   Worse with movement, especially turning his head to the right.   Noted elevated BP. CTA here negative for aortic dissection.   Also noted to be COVID positive. Had some cough but now it's minimal. No dyspnea, fevers or chills, sore throat or rhinorrhea.   Vaccinated - two doses in 2021.       PAST MEDICAL & SURGICAL HISTORY:  Hypertension      Anxiety      No significant past surgical history          Allergies    No Known Allergies    Intolerances        ANTIMICROBIALS:      OTHER MEDS:  acetaminophen     Tablet .. 650 milliGRAM(s) Oral every 6 hours PRN  ALBUTerol    90 MICROgram(s) HFA Inhaler 2 Puff(s) Inhalation every 4 hours PRN  amLODIPine   Tablet 10 milliGRAM(s) Oral daily  chlorthalidone 25 milliGRAM(s) Oral daily  heparin   Injectable 5000 Unit(s) SubCutaneous every 8 hours  LORazepam     Tablet 0.5 milliGRAM(s) Oral three times a day PRN      SOCIAL HISTORY: Nonsmoker     FAMILY HISTORY:  FHx: coronary artery disease    ROS:  All other systems negative   Constitutional: no fever, no chills  Eye: no vision changes  ENT: no sore throat, no rhinorrhea  Cardiovascular: no chest pain, no palpitation  Respiratory: per HPI   GI:  no abd pain, no vomiting, no diarrhea  urinary: no dysuria, no hematuria, no flank pain  musculoskeletal: per HPI   skin: no rash  neurology: no headache, no change in mental status  psych: no anxiety    Physical Exam:  General: awake, alert, non toxic  Head: atraumatic, normocephalic  Eyes: normal sclera and conjunctiva  ENT: no LAD, neck supple  Cardio: regular rate   Respiratory: nonlabored on room air  abd: soft, not tender  : no espinoza  Musculoskeletal: no joint swelling, no edema  Skin: no rash  vascular: no phlebitis  Neurologic: no focal deficits  psych: normal affect       Drug Dosing Weight  Height (cm): 167.6 (05 Sep 2022 04:09)  Weight (kg): 89.4 (05 Sep 2022 04:09)  BMI (kg/m2): 31.8 (05 Sep 2022 04:09)  BSA (m2): 1.99 (05 Sep 2022 04:09)    Vital Signs Last 24 Hrs  T(F): 98.6 (09-07-22 @ 09:23), Max: 99.6 (09-04-22 @ 15:43)    Vital Signs Last 24 Hrs  HR: 89 (09-07-22 @ 09:23) (67 - 92)  BP: 158/97 (09-07-22 @ 09:23) (139/93 - 164/92)  RR: 19 (09-07-22 @ 09:23)  SpO2: 98% (09-07-22 @ 09:23) (98% - 100%)  Wt(kg): --                          15.6   5.07  )-----------( 221      ( 07 Sep 2022 07:10 )             47.8       09-07    142  |  106  |  20  ----------------------------<  97  3.8   |  21<L>  |  1.28    Ca    9.3      07 Sep 2022 07:10  Phos  2.4     09-07  Mg     2.20     09-07            MICROBIOLOGY:  Flu With COVID-19 By RAÚL (09.04.22 @ 16:20)   SARS-CoV-2 Result: Detected  Influ[facility]: [facility]   [facility]   [facility]       RADIOLOGY:  Images below reviewed personally  CT Angio Chest Aorta w/wo IV Cont (09.04.22 @ 20:02)   No aortic dissection.  Hepatomegaly. Hepatic steatosis.

## 2022-09-07 NOTE — CONSULT NOTE ADULT - ASSESSMENT
62M here 9/4 for radiating neck pain after lifting a heavy trashbag.   Seems musculoskeletal.   CTA was checked due to elevated BP but no dissection noted.   COVID positive but aside from a cough that's now minimal, no symptoms.     Suggest  -no clear indication for antivirals   -workup of pain per primary team     Will sign off, call back if needed  Discussed with medicine     Gen Mcintyre MD   Infectious Disease   Available on TEAMS. After 5PM and on weekends please page fellow on call or call [phone] 62M here 9/4 for radiating neck pain after lifting a heavy trashbag.   Seems musculoskeletal.   CTA was checked due to elevated BP but no dissection noted.   COVID positive but aside from a cough that's now minimal, no symptoms.     Suggest  -no clear indication for antivirals   -workup of pain per primary team     Will sign off, call back if needed  Discussed with medicine     Gen Mcintyre MD   Infectious Disease   Available on TEAMS. After 5PM and on weekends please page fellow on call or call [phone] 62M here 9/4 for radiating neck pain after lifting a heavy trashbag.   Seems musculoskeletal.   CTA was checked due to elevated BP but no dissection noted.   COVID positive but aside from a cough that's now minimal, no symptoms.     Suggest  -no clear indication for antivirals   -workup of pain per primary team     Will sign off, call back if needed  Discussed with medicine     Gen Mcintyre MD   Infectious Disease   Available on TEAMS. After 5PM and on weekends please page fellow on call or call [phone]

## 2022-09-07 NOTE — PROVIDER CONTACT NOTE (OTHER) - RECOMMENDATIONS
Will continue to monitor.
Monitor
Will continue to monitor.
Give PM dose of hydralazine and recheck BP

## 2022-09-07 NOTE — CONSULT NOTE ADULT - SUBJECTIVE AND OBJECTIVE BOX
[facility]        Patient is a 62y old  Male who presents with a chief complaint of hypertensive urgency (07 Sep 2022 14:21)    Excerpt from H&P,"     Pt is a 61 y/o male with pmhx of HTN and anxiety (on no medications) who presents to the ER for elevated blood pressure in the setting of 5 days of feeling fatigue and vague neck pain radiating down his back and R arm. HE reports that it might have started after he lifted a heavy trashbag. No weak or tingling sensation and no worsening on exertion. He denies any chest pain nor shortness of breath and denies any cardiac history. He does endorse mild cough and post nasal drip, otherwise no headache nor fever. No abd pain, diarrhea or dysuria. He has been having a good appetite. He does endorse feeling more anxious and slightly depressed recently due to being alone but denies any suicidal ideation. He reports he was on meds in the past but not recently. In the Er pt was found hypertensive and given amlodipine and hydralazine. Also given a dose of ativan for anxiety.  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   MEDICATIONS  (STANDING):  amLODIPine   Tablet 10 milliGRAM(s) Oral daily  chlorthalidone 25 milliGRAM(s) Oral daily  heparin   Injectable 5000 Unit(s) SubCutaneous every 8 hours    MEDICATIONS  (PRN):  acetaminophen     Tablet .. 650 milliGRAM(s) Oral every 6 hours PRN Temp greater or equal to 38C (100.4F), Mild Pain (1 - 3)  ALBUTerol    90 MICROgram(s) HFA Inhaler 2 Puff(s) Inhalation every 4 hours PRN Shortness of Breath and/or Wheezing  LORazepam     Tablet 0.5 milliGRAM(s) Oral three times a day PRN Anxiety           *****REVIEW OF SYSTEM:  GEN: no fever, no chills, no pain  RESP: no SOB, no cough, no sputum  CVS: no chest pain, no palpitations, no edema  GI: no abdominal pain, no nausea, no vomiting, no constipation, no diarrhea  : no dysurea, no frequency, no hematurea  Neuro: no headache, no dizziness  PSYCH: no anxiety, no depression  Derm : no itching, no rash         *****VITAL SIGNS:  T(C): 36.9 (09-08-22 @ 05:25), Max: 37.1 (09-07-22 @ 17:30)  HR: 82 (09-08-22 @ 05:25) (82 - 96)  BP: 159/82 (09-08-22 @ 05:25) (152/88 - 162/92)  RR: 17 (09-08-22 @ 05:25) (17 - 19)  SpO2: 98% (09-08-22 @ 05:25) (98% - 99%)  Wt(kg): --    09-06 @ 07:01  -  09-07 @ 07:00  --------------------------------------------------------  IN: 300 mL / OUT: 575 mL / NET: -275 mL    09-07 @ 07:01  -  09-08 @ 06:51  --------------------------------------------------------  IN: 1700 mL / OUT: 600 mL / NET: 1100 mL             *****PHYSICAL EXAM:   Alert oriented x 3  affect is restricted   Attention comprehension are fair. Able to name, repeat  without any difficulty.   Able to follow1  step commands.     EOMI fundi not visualized,  blinks to threat     No facial asymmetry   Tongue is midline      Moving all 4 ext symmetrically    Reflexes are symmetric throughout   sensation is grossly symmetric  Gait : not assessed.  B/L down going toes      no moore            *****LAB AND IMAGING:                          15.6   5.07  )-----------( 221      ( 07 Sep 2022 07:10 )             47.8               09-07    142  |  106  |  20  ----------------------------<  97  3.8   |  21<L>  |  1.28    Ca    9.3      07 Sep 2022 07:10  Phos  2.4     09-07  Mg     2.20     09-07                  CARDIAC MARKERS ( 06 Sep 2022 08:34 )  x     / x     / 119 U/L / x     / x                      [All pertinent recent Imaging reports reviewed]         *****A S S E S S M E N T   A N D   P L A N :     Excerpt from H&P,"     Pt is a 61 y/o male with pmhx of HTN and anxiety (on no medications) who presents to the ER for elevated blood pressure in the setting of 5 days of feeling fatigue and vague neck pain radiating down his back and R arm. HE reports that it might have started after he lifted a heavy trashbag. No weak or tingling sensation and no worsening on exertion. He denies any chest pain nor shortness of breath and denies any cardiac history. He does endorse mild cough and post nasal drip, otherwise no headache nor fever. No abd pain, diarrhea or dysuria. He has been having a good appetite. He does endorse feeling more anxious and slightly depressed recently due to being alone but denies any suicidal ideation. He reports he was on meds in the past but not recently. In the Er pt was found hypertensive and given amlodipine and hydralazine. Also given a dose of ativan for anxiety.  (04 Sep 2022 22:29)         Problem/Recommendations 1:  neck pain   likely musculoskeletal   warm compress tizanidine  denies any incontinence or saddle anesthesia  exam without any weakness   justin obtain non emergent mri  cspine emg if symptoms persist             Problem/Recommendations 2:    htn   currently started on meds   adjust as necessary      pt at risk for developing delirium, therefore please institute the following preventative measures if possible          - initiating early mobilization          -minimizing "tethers" - IV, oxygen, catheters, etc          -avoiding   sedatives          -maintaining hydration/nutrition          -avoid anticholinergics - diphenhydramine, etc          -pain control          -sleep wake cycle regulation; avoid day time somnolence           -supportive environment    ___________________________  Will follow with you.  Thank you,  Teresa Castillo MD  Diplomate of the American Board of Neurology and Psychiatry.  Diplomate of the American Board of Vascular Neurology.   [facility] ([facility]), [facility]   Ph: [phone]    Differential diagnosis and plan of care discussed with patient after the evaluation.   Advanced care planning options discussed.   Pain assessed and judicious use of narcotics when appropriate was discussed.  Importance of Fall prevention discussed.  Counseling on Smoking and Alcohol cessation was offered when appropriate.  Counseling on Diet, exercise, and medication compliance was done.   83 minutes spent on the total encounter;  more than 50 % of the visit was spent on counseling  and or coordinating care by the attending physician.    Thank you for allowing me to participate in the care of this [facility] patient. Please do not hesitate to call me if you have any questions.     This and subsequent notes  will  inherently be subject to errors including those of syntax and substitutions which may escape proofreading. In such instances original meaning may be extrapolated by contextual derivation.    [facility]        Patient is a 62y old  Male who presents with a chief complaint of hypertensive urgency (07 Sep 2022 14:21)    Excerpt from H&P,"     Pt is a 61 y/o male with pmhx of HTN and anxiety (on no medications) who presents to the ER for elevated blood pressure in the setting of 5 days of feeling fatigue and vague neck pain radiating down his back and R arm. HE reports that it might have started after he lifted a heavy trashbag. No weak or tingling sensation and no worsening on exertion. He denies any chest pain nor shortness of breath and denies any cardiac history. He does endorse mild cough and post nasal drip, otherwise no headache nor fever. No abd pain, diarrhea or dysuria. He has been having a good appetite. He does endorse feeling more anxious and slightly depressed recently due to being alone but denies any suicidal ideation. He reports he was on meds in the past but not recently. In the Er pt was found hypertensive and given amlodipine and hydralazine. Also given a dose of ativan for anxiety.  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   MEDICATIONS  (STANDING):  amLODIPine   Tablet 10 milliGRAM(s) Oral daily  chlorthalidone 25 milliGRAM(s) Oral daily  heparin   Injectable 5000 Unit(s) SubCutaneous every 8 hours    MEDICATIONS  (PRN):  acetaminophen     Tablet .. 650 milliGRAM(s) Oral every 6 hours PRN Temp greater or equal to 38C (100.4F), Mild Pain (1 - 3)  ALBUTerol    90 MICROgram(s) HFA Inhaler 2 Puff(s) Inhalation every 4 hours PRN Shortness of Breath and/or Wheezing  LORazepam     Tablet 0.5 milliGRAM(s) Oral three times a day PRN Anxiety           *****REVIEW OF SYSTEM:  GEN: no fever, no chills, no pain  RESP: no SOB, no cough, no sputum  CVS: no chest pain, no palpitations, no edema  GI: no abdominal pain, no nausea, no vomiting, no constipation, no diarrhea  : no dysurea, no frequency, no hematurea  Neuro: no headache, no dizziness  PSYCH: no anxiety, no depression  Derm : no itching, no rash         *****VITAL SIGNS:  T(C): 36.9 (09-08-22 @ 05:25), Max: 37.1 (09-07-22 @ 17:30)  HR: 82 (09-08-22 @ 05:25) (82 - 96)  BP: 159/82 (09-08-22 @ 05:25) (152/88 - 162/92)  RR: 17 (09-08-22 @ 05:25) (17 - 19)  SpO2: 98% (09-08-22 @ 05:25) (98% - 99%)  Wt(kg): --    09-06 @ 07:01  -  09-07 @ 07:00  --------------------------------------------------------  IN: 300 mL / OUT: 575 mL / NET: -275 mL    09-07 @ 07:01  -  09-08 @ 06:51  --------------------------------------------------------  IN: 1700 mL / OUT: 600 mL / NET: 1100 mL             *****PHYSICAL EXAM:   Alert oriented x 3  affect is restricted   Attention comprehension are fair. Able to name, repeat  without any difficulty.   Able to follow1  step commands.     EOMI fundi not visualized,  blinks to threat     No facial asymmetry   Tongue is midline      Moving all 4 ext symmetrically    Reflexes are symmetric throughout   sensation is grossly symmetric  Gait : not assessed.  B/L down going toes      no moore            *****LAB AND IMAGING:                          15.6   5.07  )-----------( 221      ( 07 Sep 2022 07:10 )             47.8               09-07    142  |  106  |  20  ----------------------------<  97  3.8   |  21<L>  |  1.28    Ca    9.3      07 Sep 2022 07:10  Phos  2.4     09-07  Mg     2.20     09-07                  CARDIAC MARKERS ( 06 Sep 2022 08:34 )  x     / x     / 119 U/L / x     / x                      [All pertinent recent Imaging reports reviewed]         *****A S S E S S M E N T   A N D   P L A N :     Excerpt from H&P,"     Pt is a 61 y/o male with pmhx of HTN and anxiety (on no medications) who presents to the ER for elevated blood pressure in the setting of 5 days of feeling fatigue and vague neck pain radiating down his back and R arm. HE reports that it might have started after he lifted a heavy trashbag. No weak or tingling sensation and no worsening on exertion. He denies any chest pain nor shortness of breath and denies any cardiac history. He does endorse mild cough and post nasal drip, otherwise no headache nor fever. No abd pain, diarrhea or dysuria. He has been having a good appetite. He does endorse feeling more anxious and slightly depressed recently due to being alone but denies any suicidal ideation. He reports he was on meds in the past but not recently. In the Er pt was found hypertensive and given amlodipine and hydralazine. Also given a dose of ativan for anxiety.  (04 Sep 2022 22:29)         Problem/Recommendations 1:  neck pain   likely musculoskeletal   warm compress tizanidine  denies any incontinence or saddle anesthesia  exam without any weakness   justin obtain non emergent mri  cspine emg if symptoms persist             Problem/Recommendations 2:    htn   currently started on meds   adjust as necessary      pt at risk for developing delirium, therefore please institute the following preventative measures if possible          - initiating early mobilization          -minimizing "tethers" - IV, oxygen, catheters, etc          -avoiding   sedatives          -maintaining hydration/nutrition          -avoid anticholinergics - diphenhydramine, etc          -pain control          -sleep wake cycle regulation; avoid day time somnolence           -supportive environment    ___________________________  Will follow with you.  Thank you,  Teresa Castillo MD  Diplomate of the American Board of Neurology and Psychiatry.  Diplomate of the American Board of Vascular Neurology.   [facility] ([facility]), [facility]   Ph: [phone]    Differential diagnosis and plan of care discussed with patient after the evaluation.   Advanced care planning options discussed.   Pain assessed and judicious use of narcotics when appropriate was discussed.  Importance of Fall prevention discussed.  Counseling on Smoking and Alcohol cessation was offered when appropriate.  Counseling on Diet, exercise, and medication compliance was done.   83 minutes spent on the total encounter;  more than 50 % of the visit was spent on counseling  and or coordinating care by the attending physician.    Thank you for allowing me to participate in the care of this [facility] patient. Please do not hesitate to call me if you have any questions.     This and subsequent notes  will  inherently be subject to errors including those of syntax and substitutions which may escape proofreading. In such instances original meaning may be extrapolated by contextual derivation.    [facility]        Patient is a 62y old  Male who presents with a chief complaint of hypertensive urgency (07 Sep 2022 14:21)    Excerpt from H&P,"     Pt is a 61 y/o male with pmhx of HTN and anxiety (on no medications) who presents to the ER for elevated blood pressure in the setting of 5 days of feeling fatigue and vague neck pain radiating down his back and R arm. HE reports that it might have started after he lifted a heavy trashbag. No weak or tingling sensation and no worsening on exertion. He denies any chest pain nor shortness of breath and denies any cardiac history. He does endorse mild cough and post nasal drip, otherwise no headache nor fever. No abd pain, diarrhea or dysuria. He has been having a good appetite. He does endorse feeling more anxious and slightly depressed recently due to being alone but denies any suicidal ideation. He reports he was on meds in the past but not recently. In the Er pt was found hypertensive and given amlodipine and hydralazine. Also given a dose of ativan for anxiety.  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   MEDICATIONS  (STANDING):  amLODIPine   Tablet 10 milliGRAM(s) Oral daily  chlorthalidone 25 milliGRAM(s) Oral daily  heparin   Injectable 5000 Unit(s) SubCutaneous every 8 hours    MEDICATIONS  (PRN):  acetaminophen     Tablet .. 650 milliGRAM(s) Oral every 6 hours PRN Temp greater or equal to 38C (100.4F), Mild Pain (1 - 3)  ALBUTerol    90 MICROgram(s) HFA Inhaler 2 Puff(s) Inhalation every 4 hours PRN Shortness of Breath and/or Wheezing  LORazepam     Tablet 0.5 milliGRAM(s) Oral three times a day PRN Anxiety           *****REVIEW OF SYSTEM:  GEN: no fever, no chills, no pain  RESP: no SOB, no cough, no sputum  CVS: no chest pain, no palpitations, no edema  GI: no abdominal pain, no nausea, no vomiting, no constipation, no diarrhea  : no dysurea, no frequency, no hematurea  Neuro: no headache, no dizziness  PSYCH: no anxiety, no depression  Derm : no itching, no rash         *****VITAL SIGNS:  T(C): 36.9 (09-08-22 @ 05:25), Max: 37.1 (09-07-22 @ 17:30)  HR: 82 (09-08-22 @ 05:25) (82 - 96)  BP: 159/82 (09-08-22 @ 05:25) (152/88 - 162/92)  RR: 17 (09-08-22 @ 05:25) (17 - 19)  SpO2: 98% (09-08-22 @ 05:25) (98% - 99%)  Wt(kg): --    09-06 @ 07:01  -  09-07 @ 07:00  --------------------------------------------------------  IN: 300 mL / OUT: 575 mL / NET: -275 mL    09-07 @ 07:01  -  09-08 @ 06:51  --------------------------------------------------------  IN: 1700 mL / OUT: 600 mL / NET: 1100 mL             *****PHYSICAL EXAM:   Alert oriented x 3  affect is restricted   Attention comprehension are fair. Able to name, repeat  without any difficulty.   Able to follow1  step commands.     EOMI fundi not visualized,  blinks to threat     No facial asymmetry   Tongue is midline      Moving all 4 ext symmetrically    Reflexes are symmetric throughout   sensation is grossly symmetric  Gait : not assessed.  B/L down going toes      no moore            *****LAB AND IMAGING:                          15.6   5.07  )-----------( 221      ( 07 Sep 2022 07:10 )             47.8               09-07    142  |  106  |  20  ----------------------------<  97  3.8   |  21<L>  |  1.28    Ca    9.3      07 Sep 2022 07:10  Phos  2.4     09-07  Mg     2.20     09-07                  CARDIAC MARKERS ( 06 Sep 2022 08:34 )  x     / x     / 119 U/L / x     / x                      [All pertinent recent Imaging reports reviewed]         *****A S S E S S M E N T   A N D   P L A N :     Excerpt from H&P,"     Pt is a 61 y/o male with pmhx of HTN and anxiety (on no medications) who presents to the ER for elevated blood pressure in the setting of 5 days of feeling fatigue and vague neck pain radiating down his back and R arm. HE reports that it might have started after he lifted a heavy trashbag. No weak or tingling sensation and no worsening on exertion. He denies any chest pain nor shortness of breath and denies any cardiac history. He does endorse mild cough and post nasal drip, otherwise no headache nor fever. No abd pain, diarrhea or dysuria. He has been having a good appetite. He does endorse feeling more anxious and slightly depressed recently due to being alone but denies any suicidal ideation. He reports he was on meds in the past but not recently. In the Er pt was found hypertensive and given amlodipine and hydralazine. Also given a dose of ativan for anxiety.  (04 Sep 2022 22:29)         Problem/Recommendations 1:  neck pain   likely musculoskeletal   warm compress tizanidine  denies any incontinence or saddle anesthesia  exam without any weakness   justin obtain non emergent mri  cspine emg if symptoms persist             Problem/Recommendations 2:    htn   currently started on meds   adjust as necessary      pt at risk for developing delirium, therefore please institute the following preventative measures if possible          - initiating early mobilization          -minimizing "tethers" - IV, oxygen, catheters, etc          -avoiding   sedatives          -maintaining hydration/nutrition          -avoid anticholinergics - diphenhydramine, etc          -pain control          -sleep wake cycle regulation; avoid day time somnolence           -supportive environment    ___________________________  Will follow with you.  Thank you,  Teresa Castillo MD  Diplomate of the American Board of Neurology and Psychiatry.  Diplomate of the American Board of Vascular Neurology.   [facility] ([facility]), [facility]   Ph: [phone]    Differential diagnosis and plan of care discussed with patient after the evaluation.   Advanced care planning options discussed.   Pain assessed and judicious use of narcotics when appropriate was discussed.  Importance of Fall prevention discussed.  Counseling on Smoking and Alcohol cessation was offered when appropriate.  Counseling on Diet, exercise, and medication compliance was done.   83 minutes spent on the total encounter;  more than 50 % of the visit was spent on counseling  and or coordinating care by the attending physician.    Thank you for allowing me to participate in the care of this [facility] patient. Please do not hesitate to call me if you have any questions.     This and subsequent notes  will  inherently be subject to errors including those of syntax and substitutions which may escape proofreading. In such instances original meaning may be extrapolated by contextual derivation.

## 2022-09-07 NOTE — PROVIDER CONTACT NOTE (OTHER) - ASSESSMENT
pt denies headache, dizziness, chest pain, blurry vision, light headedness
pt denies chest pain, sob, palpitations, headache, dizziness, blurry vision
Patient is asymptomatic, resting comfortably in bed at this time.  Patient denies chest pain, shortness of breath, dizziness.
Patient asymptomatic, resting comfortably in bed at this time, in no acute distress.  /92.
Pt asymptomatic
Patient asymptomatic, resting comfortably in bed.  Patient denies chest pain, shortness of breath, discomfort at this time.
Patient asymptomatic laying comfortably in bed

## 2022-09-08 LAB
ANION GAP SERPL CALC-SCNC: 14 MMOL/L — SIGNIFICANT CHANGE UP (ref 7–14)
BASOPHILS # BLD AUTO: 0.03 K/UL — SIGNIFICANT CHANGE UP (ref 0–0.2)
BASOPHILS NFR BLD AUTO: 0.5 % — SIGNIFICANT CHANGE UP (ref 0–2)
BUN SERPL-MCNC: 22 MG/DL — SIGNIFICANT CHANGE UP (ref 7–23)
CALCIUM SERPL-MCNC: 9.5 MG/DL — SIGNIFICANT CHANGE UP (ref 8.4–10.5)
CHLORIDE SERPL-SCNC: 104 MMOL/L — SIGNIFICANT CHANGE UP (ref 98–107)
CO2 SERPL-SCNC: 22 MMOL/L — SIGNIFICANT CHANGE UP (ref 22–31)
CREAT SERPL-MCNC: 1.41 MG/DL — HIGH (ref 0.5–1.3)
EGFR: 57 ML/MIN/1.73M2 — LOW
EOSINOPHIL # BLD AUTO: 0.14 K/UL — SIGNIFICANT CHANGE UP (ref 0–0.5)
EOSINOPHIL NFR BLD AUTO: 2.3 % — SIGNIFICANT CHANGE UP (ref 0–6)
GLUCOSE SERPL-MCNC: 106 MG/DL — HIGH (ref 70–99)
HCT VFR BLD CALC: 48.8 % — SIGNIFICANT CHANGE UP (ref 39–50)
HGB BLD-MCNC: 15.8 G/DL — SIGNIFICANT CHANGE UP (ref 13–17)
IANC: 3.81 K/UL — SIGNIFICANT CHANGE UP (ref 1.8–7.4)
IMM GRANULOCYTES NFR BLD AUTO: 0.5 % — SIGNIFICANT CHANGE UP (ref 0–1.5)
LYMPHOCYTES # BLD AUTO: 1.67 K/UL — SIGNIFICANT CHANGE UP (ref 1–3.3)
LYMPHOCYTES # BLD AUTO: 26.9 % — SIGNIFICANT CHANGE UP (ref 13–44)
MAGNESIUM SERPL-MCNC: 2.3 MG/DL — SIGNIFICANT CHANGE UP (ref 1.6–2.6)
MCHC RBC-ENTMCNC: 27.7 PG — SIGNIFICANT CHANGE UP (ref 27–34)
MCHC RBC-ENTMCNC: 32.4 GM/DL — SIGNIFICANT CHANGE UP (ref 32–36)
MCV RBC AUTO: 85.6 FL — SIGNIFICANT CHANGE UP (ref 80–100)
MONOCYTES # BLD AUTO: 0.52 K/UL — SIGNIFICANT CHANGE UP (ref 0–0.9)
MONOCYTES NFR BLD AUTO: 8.4 % — SIGNIFICANT CHANGE UP (ref 2–14)
NEUTROPHILS # BLD AUTO: 3.81 K/UL — SIGNIFICANT CHANGE UP (ref 1.8–7.4)
NEUTROPHILS NFR BLD AUTO: 61.4 % — SIGNIFICANT CHANGE UP (ref 43–77)
NRBC # BLD: 0 /100 WBCS — SIGNIFICANT CHANGE UP (ref 0–0)
NRBC # FLD: 0 K/UL — SIGNIFICANT CHANGE UP (ref 0–0)
PHOSPHATE SERPL-MCNC: 3.1 MG/DL — SIGNIFICANT CHANGE UP (ref 2.5–4.5)
PLATELET # BLD AUTO: 240 K/UL — SIGNIFICANT CHANGE UP (ref 150–400)
POTASSIUM SERPL-MCNC: 4.2 MMOL/L — SIGNIFICANT CHANGE UP (ref 3.5–5.3)
POTASSIUM SERPL-SCNC: 4.2 MMOL/L — SIGNIFICANT CHANGE UP (ref 3.5–5.3)
RBC # BLD: 5.7 M/UL — SIGNIFICANT CHANGE UP (ref 4.2–5.8)
RBC # FLD: 13.9 % — SIGNIFICANT CHANGE UP (ref 10.3–14.5)
SODIUM SERPL-SCNC: 140 MMOL/L — SIGNIFICANT CHANGE UP (ref 135–145)
WBC # BLD: 6.2 K/UL — SIGNIFICANT CHANGE UP (ref 3.8–10.5)
WBC # FLD AUTO: 6.2 K/UL — SIGNIFICANT CHANGE UP (ref 3.8–10.5)

## 2022-09-08 RX ORDER — NORTRIPTYLINE HYDROCHLORIDE 10 MG/1
10 CAPSULE ORAL DAILY
Refills: 0 | Status: DISCONTINUED | OUTPATIENT
Start: 2022-09-08 | End: 2022-09-09

## 2022-09-08 RX ORDER — TIZANIDINE 4 MG/1
2 TABLET ORAL EVERY 8 HOURS
Refills: 0 | Status: DISCONTINUED | OUTPATIENT
Start: 2022-09-08 | End: 2022-09-09

## 2022-09-08 RX ADMIN — Medication 0.5 MILLIGRAM(S): at 09:42

## 2022-09-08 RX ADMIN — Medication 5 MILLILITER(S): at 00:48

## 2022-09-08 RX ADMIN — TIZANIDINE 2 MILLIGRAM(S): 4 TABLET ORAL at 17:26

## 2022-09-08 RX ADMIN — NORTRIPTYLINE HYDROCHLORIDE 10 MILLIGRAM(S): 10 CAPSULE ORAL at 12:42

## 2022-09-08 RX ADMIN — HEPARIN SODIUM 5000 UNIT(S): 5000 INJECTION INTRAVENOUS; SUBCUTANEOUS at 21:25

## 2022-09-08 RX ADMIN — AMLODIPINE BESYLATE 10 MILLIGRAM(S): 2.5 TABLET ORAL at 05:34

## 2022-09-08 RX ADMIN — HEPARIN SODIUM 5000 UNIT(S): 5000 INJECTION INTRAVENOUS; SUBCUTANEOUS at 12:42

## 2022-09-08 RX ADMIN — Medication 25 MILLIGRAM(S): at 05:36

## 2022-09-08 RX ADMIN — HEPARIN SODIUM 5000 UNIT(S): 5000 INJECTION INTRAVENOUS; SUBCUTANEOUS at 05:34

## 2022-09-08 NOTE — CONSULT NOTE ADULT - SUBJECTIVE AND OBJECTIVE BOX
HPI: Mr. Isaacs is a 62 year-old man with medical history prior to admission only significant for hypertension. He presented 9/4/22 to the [facility] ER with fatigue and vage neck pain radiating down his back and right arm for 5 days. He was noted to be severely hypertensive in the ER (170s-190s/80s-100s); he was given Amlodipine and Hydralazine, as well as Ativan for anxiety. He was noted to be COVID positive on admission. Antihypertensives have been getting adjusted since admission; now off Hydralazine and on Amlodipine 10qd and Chlorthalidone 25mg po daily; SBP down to 140s-150s systolic. Creatinine today noted to rise from 1.1 to 1.4mg/dL; therefore a renal consultation was requested.        PAST MEDICAL & SURGICAL HISTORY:  Hypertension  Anxiety  No significant past surgical history    Allergies  No Known Allergies    SOCIAL HISTORY:  Denies ETOh,Smoking,     FAMILY HISTORY:  FHx: coronary artery disease    REVIEW OF SYSTEMS:  CONSTITUTIONAL: (+)fatigue  EYES/ENT: No visual changes;  No vertigo or throat pain   NECK: No pain or stiffness  RESPIRATORY: No cough, wheezing, hemoptysis; No shortness of breath  CARDIOVASCULAR: No chest pain or palpitations  GASTROINTESTINAL: No abdominal or epigastric pain. No nausea, vomiting, or hematemesis; No diarrhea or constipation. No melena or hematochezia.  GENITOURINARY: No dysuria, frequency or hematuria  NEUROLOGICAL: (+)back pain, (+)RUE pain, (+)anxiety  SKIN: No itching, burning, rashes, or lesions   All other review of systems is negative unless indicated above.    VITAL:  T(C): , Max: 37.1 (09-07-22 @ 17:30)  T(F): , Max: 98.8 (09-07-22 @ 17:30)  HR: 90 (09-08-22 @ 13:25)  BP: 149/98 (09-08-22 @ 13:25)  RR: 18 (09-08-22 @ 13:25)  SpO2: 100% (09-08-22 @ 13:25)    PHYSICAL EXAM:  Constitutional: NAD, Alert  HEENT: NCAT, MMM  Neck: Supple, No JVD  Respiratory: CTA-b/l  Cardiovascular: RRR s1s2, no m/r/g  Gastrointestinal: BS+, soft, NT/ND  Extremities: No peripheral edema b/l  Neurological: no focal deficits; strength grossly intact  Back: no CVAT b/l  Skin: No rashes, no nevi    LABS:                        15.8   6.20  )-----------( 240      ( 08 Sep 2022 07:04 )             48.8     Na(140)/K(4.2)/Cl(104)/HCO3(22)/BUN(22)/Cr(1.41)Glu(106)/Ca(9.5)/Mg(2.30)/PO4(3.1)    09-08 @ 07:04  Na(142)/K(3.8)/Cl(106)/HCO3(21)/BUN(20)/Cr(1.28)Glu(97)/Ca(9.3)/Mg(2.20)/PO4(2.4)    09-07 @ 07:10  Na(139)/K(3.6)/Cl(104)/HCO3(23)/BUN(13)/Cr(1.15)Glu(96)/Ca(9.2)/Mg(2.20)/PO4(2.3)    09-06 @ 08:34    Creatinine, Random Urine: 146 mg/dL (09-07 @ 11:10)  Protein/Creatinine Ratio Calculation: 0.1 Ratio (09-07 @ 11:10)        IMAGING:  < from: CT Angio Chest Aorta w/wo IV Cont (09.04.22 @ 20:02) >  KIDNEYS/URETERS: Symmetric enhancement. No hydronephrosis. The left lower   pole cysts.  No aortic dissection.  Hepatomegaly. Hepatic steatosis.    < from: Transthoracic Echocardiogram (09.05.22 @ 09:25) >  1. Mitral annular calcification, otherwise normal mitral  valve. Minimal mitral regurgitation.  2. Mild concentric left ventricular hypertrophy.  3. Normal left ventricular systolic function. No segmental  wall motion abnormalities.  4. Mild diastolic dysfunction (Stage I).  5. Normal right ventricular size and function.      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  (2)CV - BP mildly high at present but significantly improved relative to admission and acceptable for now    RECOMMEND:  (1)Antihypertensives as ordered  (2)Would not object to discharge in a.m. provided creatinine relatively stable and provided no further complications ensue. Could f/u at my office in 1-2 months as outpatient    Thank you for involving [facility] in this patient's care.    With warm regards,    Faisal Hernandez MD   [facility]  Office: [phone]  Cell: [phone]             HPI: Mr. Isaacs is a 62 year-old man with medical history prior to admission only significant for hypertension. He presented 9/4/22 to the [facility] ER with fatigue and vage neck pain radiating down his back and right arm for 5 days. He was noted to be severely hypertensive in the ER (170s-190s/80s-100s); he was given Amlodipine and Hydralazine, as well as Ativan for anxiety. He was noted to be COVID positive on admission. Antihypertensives have been getting adjusted since admission; now off Hydralazine and on Amlodipine 10qd and Chlorthalidone 25mg po daily; SBP down to 140s-150s systolic. Creatinine today noted to rise from 1.1 to 1.4mg/dL; therefore a renal consultation was requested.        PAST MEDICAL & SURGICAL HISTORY:  Hypertension  Anxiety  No significant past surgical history    Allergies  No Known Allergies    SOCIAL HISTORY:  Denies ETOh,Smoking,     FAMILY HISTORY:  FHx: coronary artery disease    REVIEW OF SYSTEMS:  CONSTITUTIONAL: (+)fatigue  EYES/ENT: No visual changes;  No vertigo or throat pain   NECK: No pain or stiffness  RESPIRATORY: No cough, wheezing, hemoptysis; No shortness of breath  CARDIOVASCULAR: No chest pain or palpitations  GASTROINTESTINAL: No abdominal or epigastric pain. No nausea, vomiting, or hematemesis; No diarrhea or constipation. No melena or hematochezia.  GENITOURINARY: No dysuria, frequency or hematuria  NEUROLOGICAL: (+)back pain, (+)RUE pain, (+)anxiety  SKIN: No itching, burning, rashes, or lesions   All other review of systems is negative unless indicated above.    VITAL:  T(C): , Max: 37.1 (09-07-22 @ 17:30)  T(F): , Max: 98.8 (09-07-22 @ 17:30)  HR: 90 (09-08-22 @ 13:25)  BP: 149/98 (09-08-22 @ 13:25)  RR: 18 (09-08-22 @ 13:25)  SpO2: 100% (09-08-22 @ 13:25)    PHYSICAL EXAM:  Constitutional: NAD, Alert  HEENT: NCAT, MMM  Neck: Supple, No JVD  Respiratory: CTA-b/l  Cardiovascular: RRR s1s2, no m/r/g  Gastrointestinal: BS+, soft, NT/ND  Extremities: No peripheral edema b/l  Neurological: no focal deficits; strength grossly intact  Back: no CVAT b/l  Skin: No rashes, no nevi    LABS:                        15.8   6.20  )-----------( 240      ( 08 Sep 2022 07:04 )             48.8     Na(140)/K(4.2)/Cl(104)/HCO3(22)/BUN(22)/Cr(1.41)Glu(106)/Ca(9.5)/Mg(2.30)/PO4(3.1)    09-08 @ 07:04  Na(142)/K(3.8)/Cl(106)/HCO3(21)/BUN(20)/Cr(1.28)Glu(97)/Ca(9.3)/Mg(2.20)/PO4(2.4)    09-07 @ 07:10  Na(139)/K(3.6)/Cl(104)/HCO3(23)/BUN(13)/Cr(1.15)Glu(96)/Ca(9.2)/Mg(2.20)/PO4(2.3)    09-06 @ 08:34    Creatinine, Random Urine: 146 mg/dL (09-07 @ 11:10)  Protein/Creatinine Ratio Calculation: 0.1 Ratio (09-07 @ 11:10)        IMAGING:  < from: CT Angio Chest Aorta w/wo IV Cont (09.04.22 @ 20:02) >  KIDNEYS/URETERS: Symmetric enhancement. No hydronephrosis. The left lower   pole cysts.  No aortic dissection.  Hepatomegaly. Hepatic steatosis.    < from: Transthoracic Echocardiogram (09.05.22 @ 09:25) >  1. Mitral annular calcification, otherwise normal mitral  valve. Minimal mitral regurgitation.  2. Mild concentric left ventricular hypertrophy.  3. Normal left ventricular systolic function. No segmental  wall motion abnormalities.  4. Mild diastolic dysfunction (Stage I).  5. Normal right ventricular size and function.      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  (2)CV - BP mildly high at present but significantly improved relative to admission and acceptable for now    RECOMMEND:  (1)Antihypertensives as ordered  (2)Would not object to discharge in a.m. provided creatinine relatively stable and provided no further complications ensue. Could f/u at my office in 1-2 months as outpatient    Thank you for involving [facility] in this patient's care.    With warm regards,    Faisal Hernandez MD   [facility]  Office: [phone]  Cell: [phone]             HPI: Mr. Isaacs is a 62 year-old man with medical history prior to admission only significant for hypertension. He presented 9/4/22 to the [facility] ER with fatigue and vage neck pain radiating down his back and right arm for 5 days. He was noted to be severely hypertensive in the ER (170s-190s/80s-100s); he was given Amlodipine and Hydralazine, as well as Ativan for anxiety. He was noted to be COVID positive on admission. Antihypertensives have been getting adjusted since admission; now off Hydralazine and on Amlodipine 10qd and Chlorthalidone 25mg po daily; SBP down to 140s-150s systolic. Creatinine today noted to rise from 1.1 to 1.4mg/dL; therefore a renal consultation was requested.        PAST MEDICAL & SURGICAL HISTORY:  Hypertension  Anxiety  No significant past surgical history    Allergies  No Known Allergies    SOCIAL HISTORY:  Denies ETOh,Smoking,     FAMILY HISTORY:  FHx: coronary artery disease    REVIEW OF SYSTEMS:  CONSTITUTIONAL: (+)fatigue  EYES/ENT: No visual changes;  No vertigo or throat pain   NECK: No pain or stiffness  RESPIRATORY: No cough, wheezing, hemoptysis; No shortness of breath  CARDIOVASCULAR: No chest pain or palpitations  GASTROINTESTINAL: No abdominal or epigastric pain. No nausea, vomiting, or hematemesis; No diarrhea or constipation. No melena or hematochezia.  GENITOURINARY: No dysuria, frequency or hematuria  NEUROLOGICAL: (+)back pain, (+)RUE pain, (+)anxiety  SKIN: No itching, burning, rashes, or lesions   All other review of systems is negative unless indicated above.    VITAL:  T(C): , Max: 37.1 (09-07-22 @ 17:30)  T(F): , Max: 98.8 (09-07-22 @ 17:30)  HR: 90 (09-08-22 @ 13:25)  BP: 149/98 (09-08-22 @ 13:25)  RR: 18 (09-08-22 @ 13:25)  SpO2: 100% (09-08-22 @ 13:25)    PHYSICAL EXAM:  Constitutional: NAD, Alert  HEENT: NCAT, MMM  Neck: Supple, No JVD  Respiratory: CTA-b/l  Cardiovascular: RRR s1s2, no m/r/g  Gastrointestinal: BS+, soft, NT/ND  Extremities: No peripheral edema b/l  Neurological: no focal deficits; strength grossly intact  Back: no CVAT b/l  Skin: No rashes, no nevi    LABS:                        15.8   6.20  )-----------( 240      ( 08 Sep 2022 07:04 )             48.8     Na(140)/K(4.2)/Cl(104)/HCO3(22)/BUN(22)/Cr(1.41)Glu(106)/Ca(9.5)/Mg(2.30)/PO4(3.1)    09-08 @ 07:04  Na(142)/K(3.8)/Cl(106)/HCO3(21)/BUN(20)/Cr(1.28)Glu(97)/Ca(9.3)/Mg(2.20)/PO4(2.4)    09-07 @ 07:10  Na(139)/K(3.6)/Cl(104)/HCO3(23)/BUN(13)/Cr(1.15)Glu(96)/Ca(9.2)/Mg(2.20)/PO4(2.3)    09-06 @ 08:34    Creatinine, Random Urine: 146 mg/dL (09-07 @ 11:10)  Protein/Creatinine Ratio Calculation: 0.1 Ratio (09-07 @ 11:10)        IMAGING:  < from: CT Angio Chest Aorta w/wo IV Cont (09.04.22 @ 20:02) >  KIDNEYS/URETERS: Symmetric enhancement. No hydronephrosis. The left lower   pole cysts.  No aortic dissection.  Hepatomegaly. Hepatic steatosis.    < from: Transthoracic Echocardiogram (09.05.22 @ 09:25) >  1. Mitral annular calcification, otherwise normal mitral  valve. Minimal mitral regurgitation.  2. Mild concentric left ventricular hypertrophy.  3. Normal left ventricular systolic function. No segmental  wall motion abnormalities.  4. Mild diastolic dysfunction (Stage I).  5. Normal right ventricular size and function.      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  (2)CV - BP mildly high at present but significantly improved relative to admission and acceptable for now    RECOMMEND:  (1)Antihypertensives as ordered  (2)Would not object to discharge in a.m. provided creatinine relatively stable and provided no further complications ensue. Could f/u at my office in 1-2 months as outpatient    Thank you for involving [facility] in this patient's care.    With warm regards,    Faisal Hernandez MD   [facility]  Office: [phone]  Cell: [phone]             HPI: Mr. Isaacs is a 62 year-old man with medical history prior to admission only significant for hypertension. He presented 9/4/22 to the [facility] ER with fatigue and vage neck pain radiating down his back and right arm for 5 days. He was noted to be severely hypertensive in the ER (170s-190s/80s-100s); he was given Amlodipine and Hydralazine, as well as Ativan for anxiety. He was noted to be COVID positive on admission. Antihypertensives have been getting adjusted since admission; now off Hydralazine and on Amlodipine 10qd and Chlorthalidone 25mg po daily; SBP down to 140s-150s systolic. Creatinine today noted to rise from 1.1 to 1.4mg/dL; therefore a renal consultation was requested.    Mr. Isaacs denies known prior history of CKD or YOANDY. He attests to urinary frequency, and he denies any other urinary issues. He occasionally uses NSAIDs. He does not follow closely with physicians as outpatient    PAST MEDICAL & SURGICAL HISTORY:  Hypertension  Anxiety  No significant past surgical history    Allergies  No Known Allergies    SOCIAL HISTORY:  Denies ETOh,Smoking,     FAMILY HISTORY:  FHx: coronary artery disease    REVIEW OF SYSTEMS:  CONSTITUTIONAL: (+)fatigue  EYES/ENT: No visual changes;  No vertigo or throat pain   NECK: No pain or stiffness  RESPIRATORY: No cough, wheezing, hemoptysis; No shortness of breath  CARDIOVASCULAR: No chest pain or palpitations  GASTROINTESTINAL: No abdominal or epigastric pain. No nausea, vomiting, or hematemesis; No diarrhea or constipation. No melena or hematochezia.  GENITOURINARY: No dysuria, frequency or hematuria  NEUROLOGICAL: (+)back pain, (+)RUE pain, (+)anxiety  SKIN: No itching, burning, rashes, or lesions   All other review of systems is negative unless indicated above.    VITAL:  T(C): , Max: 37.1 (09-07-22 @ 17:30)  T(F): , Max: 98.8 (09-07-22 @ 17:30)  HR: 90 (09-08-22 @ 13:25)  BP: 149/98 (09-08-22 @ 13:25)  RR: 18 (09-08-22 @ 13:25)  SpO2: 100% (09-08-22 @ 13:25)    PHYSICAL EXAM:  Constitutional: NAD, Alert  HEENT: NCAT, MMM  Neck: Supple, No JVD  Respiratory: CTA-b/l  Cardiovascular: RRR s1s2, no m/r/g  Gastrointestinal: BS+, soft, NT/ND  Extremities: No peripheral edema b/l  Neurological: no focal deficits; strength grossly intact  Back: no CVAT b/l  Skin: No rashes, no nevi    LABS:                        15.8   6.20  )-----------( 240      ( 08 Sep 2022 07:04 )             48.8     Na(140)/K(4.2)/Cl(104)/HCO3(22)/BUN(22)/Cr(1.41)Glu(106)/Ca(9.5)/Mg(2.30)/PO4(3.1)    09-08 @ 07:04  Na(142)/K(3.8)/Cl(106)/HCO3(21)/BUN(20)/Cr(1.28)Glu(97)/Ca(9.3)/Mg(2.20)/PO4(2.4)    09-07 @ 07:10  Na(139)/K(3.6)/Cl(104)/HCO3(23)/BUN(13)/Cr(1.15)Glu(96)/Ca(9.2)/Mg(2.20)/PO4(2.3)    09-06 @ 08:34    Creatinine, Random Urine: 146 mg/dL (09-07 @ 11:10)  Protein/Creatinine Ratio Calculation: 0.1 Ratio (09-07 @ 11:10)        IMAGING:  < from: CT Angio Chest Aorta w/wo IV Cont (09.04.22 @ 20:02) >  KIDNEYS/URETERS: Symmetric enhancement. No hydronephrosis. The left lower   pole cysts.  No aortic dissection.  Hepatomegaly. Hepatic steatosis.    < from: Transthoracic Echocardiogram (09.05.22 @ 09:25) >  1. Mitral annular calcification, otherwise normal mitral  valve. Minimal mitral regurgitation.  2. Mild concentric left ventricular hypertrophy.  3. Normal left ventricular systolic function. No segmental  wall motion abnormalities.  4. Mild diastolic dysfunction (Stage I).  5. Normal right ventricular size and function.      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  (2)CV - BP mildly high at present but significantly improved relative to admission and acceptable for now    RECOMMEND:  (1)Antihypertensives as ordered  (2)Would not object to discharge in a.m. provided creatinine relatively stable and provided no further complications ensue. Could f/u at my office in 1-2 months as outpatient    Thank you for involving [facility] in this patient's care.    With warm regards,    Faisal Hernandez MD   [facility]  Office: [phone]  Cell: [phone]             HPI: Mr. Isaacs is a 62 year-old man with medical history prior to admission only significant for hypertension. He presented 9/4/22 to the [facility] ER with fatigue and vage neck pain radiating down his back and right arm for 5 days. He was noted to be severely hypertensive in the ER (170s-190s/80s-100s); he was given Amlodipine and Hydralazine, as well as Ativan for anxiety. He was noted to be COVID positive on admission. Antihypertensives have been getting adjusted since admission; now off Hydralazine and on Amlodipine 10qd and Chlorthalidone 25mg po daily; SBP down to 140s-150s systolic. Creatinine today noted to rise from 1.1 to 1.4mg/dL; therefore a renal consultation was requested.    Mr. Isaacs denies known prior history of CKD or YOANDY. He attests to urinary frequency, and he denies any other urinary issues. He occasionally uses NSAIDs. He does not follow closely with physicians as outpatient    PAST MEDICAL & SURGICAL HISTORY:  Hypertension  Anxiety  No significant past surgical history    Allergies  No Known Allergies    SOCIAL HISTORY:  Denies ETOh,Smoking,     FAMILY HISTORY:  FHx: coronary artery disease    REVIEW OF SYSTEMS:  CONSTITUTIONAL: (+)fatigue  EYES/ENT: No visual changes;  No vertigo or throat pain   NECK: No pain or stiffness  RESPIRATORY: No cough, wheezing, hemoptysis; No shortness of breath  CARDIOVASCULAR: No chest pain or palpitations  GASTROINTESTINAL: No abdominal or epigastric pain. No nausea, vomiting, or hematemesis; No diarrhea or constipation. No melena or hematochezia.  GENITOURINARY: No dysuria, frequency or hematuria  NEUROLOGICAL: (+)back pain, (+)RUE pain, (+)anxiety  SKIN: No itching, burning, rashes, or lesions   All other review of systems is negative unless indicated above.    VITAL:  T(C): , Max: 37.1 (09-07-22 @ 17:30)  T(F): , Max: 98.8 (09-07-22 @ 17:30)  HR: 90 (09-08-22 @ 13:25)  BP: 149/98 (09-08-22 @ 13:25)  RR: 18 (09-08-22 @ 13:25)  SpO2: 100% (09-08-22 @ 13:25)    PHYSICAL EXAM:  Constitutional: NAD, Alert  HEENT: NCAT, MMM  Neck: Supple, No JVD  Respiratory: CTA-b/l  Cardiovascular: RRR s1s2, no m/r/g  Gastrointestinal: BS+, soft, NT/ND  Extremities: No peripheral edema b/l  Neurological: no focal deficits; strength grossly intact  Back: no CVAT b/l  Skin: No rashes, no nevi    LABS:                        15.8   6.20  )-----------( 240      ( 08 Sep 2022 07:04 )             48.8     Na(140)/K(4.2)/Cl(104)/HCO3(22)/BUN(22)/Cr(1.41)Glu(106)/Ca(9.5)/Mg(2.30)/PO4(3.1)    09-08 @ 07:04  Na(142)/K(3.8)/Cl(106)/HCO3(21)/BUN(20)/Cr(1.28)Glu(97)/Ca(9.3)/Mg(2.20)/PO4(2.4)    09-07 @ 07:10  Na(139)/K(3.6)/Cl(104)/HCO3(23)/BUN(13)/Cr(1.15)Glu(96)/Ca(9.2)/Mg(2.20)/PO4(2.3)    09-06 @ 08:34    Creatinine, Random Urine: 146 mg/dL (09-07 @ 11:10)  Protein/Creatinine Ratio Calculation: 0.1 Ratio (09-07 @ 11:10)        IMAGING:  < from: CT Angio Chest Aorta w/wo IV Cont (09.04.22 @ 20:02) >  KIDNEYS/URETERS: Symmetric enhancement. No hydronephrosis. The left lower   pole cysts.  No aortic dissection.  Hepatomegaly. Hepatic steatosis.    < from: Transthoracic Echocardiogram (09.05.22 @ 09:25) >  1. Mitral annular calcification, otherwise normal mitral  valve. Minimal mitral regurgitation.  2. Mild concentric left ventricular hypertrophy.  3. Normal left ventricular systolic function. No segmental  wall motion abnormalities.  4. Mild diastolic dysfunction (Stage I).  5. Normal right ventricular size and function.      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  (2)CV - BP mildly high at present but significantly improved relative to admission and acceptable for now    RECOMMEND:  (1)Antihypertensives as ordered  (2)Would not object to discharge in a.m. provided creatinine relatively stable and provided no further complications ensue. Could f/u at my office in 1-2 months as outpatient    Thank you for involving [facility] in this patient's care.    With warm regards,    Faisal Hernandez MD   [facility]  Office: [phone]  Cell: [phone]             HPI: Mr. Isaacs is a 62 year-old man with medical history prior to admission only significant for hypertension. He presented 9/4/22 to the [facility] ER with fatigue and vage neck pain radiating down his back and right arm for 5 days. He was noted to be severely hypertensive in the ER (170s-190s/80s-100s); he was given Amlodipine and Hydralazine, as well as Ativan for anxiety. He was noted to be COVID positive on admission. Antihypertensives have been getting adjusted since admission; now off Hydralazine and on Amlodipine 10qd and Chlorthalidone 25mg po daily; SBP down to 140s-150s systolic. Creatinine today noted to rise from 1.1 to 1.4mg/dL; therefore a renal consultation was requested.    Mr. Isaacs denies known prior history of CKD or YOANDY. He attests to urinary frequency, and he denies any other urinary issues. He occasionally uses NSAIDs. He does not follow closely with physicians as outpatient    PAST MEDICAL & SURGICAL HISTORY:  Hypertension  Anxiety  No significant past surgical history    Allergies  No Known Allergies    SOCIAL HISTORY:  Denies ETOh,Smoking,     FAMILY HISTORY:  FHx: coronary artery disease    REVIEW OF SYSTEMS:  CONSTITUTIONAL: (+)fatigue  EYES/ENT: No visual changes;  No vertigo or throat pain   NECK: No pain or stiffness  RESPIRATORY: No cough, wheezing, hemoptysis; No shortness of breath  CARDIOVASCULAR: No chest pain or palpitations  GASTROINTESTINAL: No abdominal or epigastric pain. No nausea, vomiting, or hematemesis; No diarrhea or constipation. No melena or hematochezia.  GENITOURINARY: No dysuria, frequency or hematuria  NEUROLOGICAL: (+)back pain, (+)RUE pain, (+)anxiety  SKIN: No itching, burning, rashes, or lesions   All other review of systems is negative unless indicated above.    VITAL:  T(C): , Max: 37.1 (09-07-22 @ 17:30)  T(F): , Max: 98.8 (09-07-22 @ 17:30)  HR: 90 (09-08-22 @ 13:25)  BP: 149/98 (09-08-22 @ 13:25)  RR: 18 (09-08-22 @ 13:25)  SpO2: 100% (09-08-22 @ 13:25)    PHYSICAL EXAM:  Constitutional: NAD, Alert  HEENT: NCAT, MMM  Neck: Supple, No JVD  Respiratory: CTA-b/l  Cardiovascular: RRR s1s2, no m/r/g  Gastrointestinal: BS+, soft, NT/ND  Extremities: No peripheral edema b/l  Neurological: no focal deficits; strength grossly intact  Back: no CVAT b/l  Skin: No rashes, no nevi    LABS:                        15.8   6.20  )-----------( 240      ( 08 Sep 2022 07:04 )             48.8     Na(140)/K(4.2)/Cl(104)/HCO3(22)/BUN(22)/Cr(1.41)Glu(106)/Ca(9.5)/Mg(2.30)/PO4(3.1)    09-08 @ 07:04  Na(142)/K(3.8)/Cl(106)/HCO3(21)/BUN(20)/Cr(1.28)Glu(97)/Ca(9.3)/Mg(2.20)/PO4(2.4)    09-07 @ 07:10  Na(139)/K(3.6)/Cl(104)/HCO3(23)/BUN(13)/Cr(1.15)Glu(96)/Ca(9.2)/Mg(2.20)/PO4(2.3)    09-06 @ 08:34    Creatinine, Random Urine: 146 mg/dL (09-07 @ 11:10)  Protein/Creatinine Ratio Calculation: 0.1 Ratio (09-07 @ 11:10)        IMAGING:  < from: CT Angio Chest Aorta w/wo IV Cont (09.04.22 @ 20:02) >  KIDNEYS/URETERS: Symmetric enhancement. No hydronephrosis. The left lower   pole cysts.  No aortic dissection.  Hepatomegaly. Hepatic steatosis.    < from: Transthoracic Echocardiogram (09.05.22 @ 09:25) >  1. Mitral annular calcification, otherwise normal mitral  valve. Minimal mitral regurgitation.  2. Mild concentric left ventricular hypertrophy.  3. Normal left ventricular systolic function. No segmental  wall motion abnormalities.  4. Mild diastolic dysfunction (Stage I).  5. Normal right ventricular size and function.      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  (2)CV - BP mildly high at present but significantly improved relative to admission and acceptable for now    RECOMMEND:  (1)Antihypertensives as ordered  (2)Would not object to discharge in a.m. provided creatinine relatively stable and provided no further complications ensue. Could f/u at my office in 1-2 months as outpatient    Thank you for involving [facility] in this patient's care.    With warm regards,    Faisal Hernandez MD   [facility]  Office: [phone]  Cell: [phone]

## 2022-09-08 NOTE — PROGRESS NOTE ADULT - SUBJECTIVE AND OBJECTIVE BOX
EP Attending    HISTORY OF PRESENT ILLNESS: HPI:  Pt is a 63 y/o male with pmhx of HTN and anxiety (on no medications) who presents to the ER for elevated blood pressure in the setting of 5 days of feeling fatigue and neck pain radiating down his back and R arm. HE reports that it might have started after he lifted a heavy trashbag. No weak or tingling sensation and no worsening on exertion. He denies any chest pain nor shortness of breath and denies any cardiac history. He does endorse mild cough and post nasal drip, otherwise no headache nor fever. No abd pain, diarrhea or dysuria. He has been having a good appetite. He does endorse feeling more anxious and slightly depressed recently due to being alone but denies any suicidal ideation. He reports he was on meds in the past but not recently. In the Er pt was found hypertensive and given amlodipine and hydralazine. Also given a dose of ativan for anxiety.  (04 Sep 2022 22:29)    9/5-  patient has chronic anxiety and tends to avoid medical care. has palpitations on a near-daily basis, but no fainting.  has nonspecific chest discomfort radiating into his arms and neck, but is not sure if this is angina, since it can happen at rest.  no prior known MI or stroke.  no prior known vascular disease.  he presented in hypertensive urgency vs emergency, /110s with chest/neck discomfort.  No dissection on CTA.  sinus rhythm on telemetry. He is essentially a new start to oral antihypertensives after lapsed care for a few years.    A 10 pt ROS is otherwise negative.  9/6- feeling well, able to lie flat, no significant shortness of breath.  no angina or palpitations today.  9/7- resting comfortably. no new complaints.  Date of service 9/8- no new complaints.  resting comfortably, ready to go home.    acetaminophen     Tablet .. 650 milliGRAM(s) Oral every 6 hours PRN  ALBUTerol    90 MICROgram(s) HFA Inhaler 2 Puff(s) Inhalation every 4 hours PRN  amLODIPine   Tablet 10 milliGRAM(s) Oral daily  chlorthalidone 25 milliGRAM(s) Oral daily  heparin   Injectable 5000 Unit(s) SubCutaneous every 8 hours  LORazepam     Tablet 0.5 milliGRAM(s) Oral three times a day PRN  nortriptyline 10 milliGRAM(s) Oral daily  tiZANidine 2 milliGRAM(s) Oral every 8 hours                            15.8   6.20  )-----------( 240      ( 08 Sep 2022 07:04 )             48.8       09-08    140  |  104  |  22  ----------------------------<  106<H>  4.2   |  22  |  1.41<H>    Ca    9.5      08 Sep 2022 07:04  Phos  3.1     09-08  Mg     2.30     09-08    CARDIAC MARKERS ( 06 Sep 2022 08:34 )  x     / x     / 119 U/L / x     / x          T(C): 36.7 (09-08-22 @ 13:25), Max: 37.1 (09-07-22 @ 17:30)  HR: 90 (09-08-22 @ 13:25) (82 - 96)  BP: 149/98 (09-08-22 @ 13:25) (149/98 - 162/92)  RR: 18 (09-08-22 @ 13:25) (17 - 19)  SpO2: 100% (09-08-22 @ 13:25) (98% - 100%)  Wt(kg): --    I&O's Summary    07 Sep 2022 07:01  -  08 Sep 2022 07:00  --------------------------------------------------------  IN: 1700 mL / OUT: 600 mL / NET: 1100 mL    08 Sep 2022 07:01  -  08 Sep 2022 15:21  --------------------------------------------------------  IN: 200 mL / OUT: 0 mL / NET: 200 mL      General: Well nourished, no acute distress, alert and oriented x 3  Head: normocephalic, no trauma  Neck: no JVD, no bruit, supple, not enlarged  CV: S1S2, no S3, regular rate, rhythm is SINUS, no murmurs.    Lungs: clear BL, no rales or wheezes  Abdomen: bowel sounds +, soft, nontender, nondistended  Extremities: no clubbing, cyanosis or edema  Neuro: Moves all 4 extremities, sensation intact x 4 extremities  Skin: warm and moist, normal turgor  Psych: Mood and affect are appropriate for circumstances  MSK: normal range of motion and strength x4 extremities.    TELEMETRY: NSR, no AFib.    ECG:  	NSR , anterior and lateral T wave inversions, PAc's.    < from: CT Angio Chest Aorta w/wo IV Cont (09.04.22 @ 20:02) >    IMPRESSION:    No aortic dissection.  Hepatomegaly. Hepatic steatosis.    < end of copied text >      ASSESSMENT/PLAN: 	Pt is a 63 y/o male here with shortness of breath, fatigue, atypical chest pain.  Found to have COVID.  Lapsed care for hypertension.  Palpitations.  Anxiety disorder?    --Expected pulmonary and systemic symptoms from COVID  --Echo reassuring with normal LVEF.  --Antihypertensives per general cardiology.   --Telemetry monitoring for AFib or SVT.  Would defer invasive management of any arrhythmia until after cleared from COVID isolation.  --Maintain K 4-4.5, Mg 2 via oral route.  --If no arrhythmia identified in hospital, will recommend ambulatory event monitoring.    Jean Jo M.D.  Cardiac Electrophysiology  [phone]     EP Attending    HISTORY OF PRESENT ILLNESS: HPI:  Pt is a 63 y/o male with pmhx of HTN and anxiety (on no medications) who presents to the ER for elevated blood pressure in the setting of 5 days of feeling fatigue and neck pain radiating down his back and R arm. HE reports that it might have started after he lifted a heavy trashbag. No weak or tingling sensation and no worsening on exertion. He denies any chest pain nor shortness of breath and denies any cardiac history. He does endorse mild cough and post nasal drip, otherwise no headache nor fever. No abd pain, diarrhea or dysuria. He has been having a good appetite. He does endorse feeling more anxious and slightly depressed recently due to being alone but denies any suicidal ideation. He reports he was on meds in the past but not recently. In the Er pt was found hypertensive and given amlodipine and hydralazine. Also given a dose of ativan for anxiety.  (04 Sep 2022 22:29)    9/5-  patient has chronic anxiety and tends to avoid medical care. has palpitations on a near-daily basis, but no fainting.  has nonspecific chest discomfort radiating into his arms and neck, but is not sure if this is angina, since it can happen at rest.  no prior known MI or stroke.  no prior known vascular disease.  he presented in hypertensive urgency vs emergency, /110s with chest/neck discomfort.  No dissection on CTA.  sinus rhythm on telemetry. He is essentially a new start to oral antihypertensives after lapsed care for a few years.    A 10 pt ROS is otherwise negative.  9/6- feeling well, able to lie flat, no significant shortness of breath.  no angina or palpitations today.  9/7- resting comfortably. no new complaints.  Date of service 9/8- no new complaints.  resting comfortably, ready to go home.    acetaminophen     Tablet .. 650 milliGRAM(s) Oral every 6 hours PRN  ALBUTerol    90 MICROgram(s) HFA Inhaler 2 Puff(s) Inhalation every 4 hours PRN  amLODIPine   Tablet 10 milliGRAM(s) Oral daily  chlorthalidone 25 milliGRAM(s) Oral daily  heparin   Injectable 5000 Unit(s) SubCutaneous every 8 hours  LORazepam     Tablet 0.5 milliGRAM(s) Oral three times a day PRN  nortriptyline 10 milliGRAM(s) Oral daily  tiZANidine 2 milliGRAM(s) Oral every 8 hours                            15.8   6.20  )-----------( 240      ( 08 Sep 2022 07:04 )             48.8       09-08    140  |  104  |  22  ----------------------------<  106<H>  4.2   |  22  |  1.41<H>    Ca    9.5      08 Sep 2022 07:04  Phos  3.1     09-08  Mg     2.30     09-08    CARDIAC MARKERS ( 06 Sep 2022 08:34 )  x     / x     / 119 U/L / x     / x          T(C): 36.7 (09-08-22 @ 13:25), Max: 37.1 (09-07-22 @ 17:30)  HR: 90 (09-08-22 @ 13:25) (82 - 96)  BP: 149/98 (09-08-22 @ 13:25) (149/98 - 162/92)  RR: 18 (09-08-22 @ 13:25) (17 - 19)  SpO2: 100% (09-08-22 @ 13:25) (98% - 100%)  Wt(kg): --    I&O's Summary    07 Sep 2022 07:01  -  08 Sep 2022 07:00  --------------------------------------------------------  IN: 1700 mL / OUT: 600 mL / NET: 1100 mL    08 Sep 2022 07:01  -  08 Sep 2022 15:21  --------------------------------------------------------  IN: 200 mL / OUT: 0 mL / NET: 200 mL      General: Well nourished, no acute distress, alert and oriented x 3  Head: normocephalic, no trauma  Neck: no JVD, no bruit, supple, not enlarged  CV: S1S2, no S3, regular rate, rhythm is SINUS, no murmurs.    Lungs: clear BL, no rales or wheezes  Abdomen: bowel sounds +, soft, nontender, nondistended  Extremities: no clubbing, cyanosis or edema  Neuro: Moves all 4 extremities, sensation intact x 4 extremities  Skin: warm and moist, normal turgor  Psych: Mood and affect are appropriate for circumstances  MSK: normal range of motion and strength x4 extremities.    TELEMETRY: NSR, no AFib.    ECG:  	NSR , anterior and lateral T wave inversions, PAc's.    < from: CT Angio Chest Aorta w/wo IV Cont (09.04.22 @ 20:02) >    IMPRESSION:    No aortic dissection.  Hepatomegaly. Hepatic steatosis.    < end of copied text >      ASSESSMENT/PLAN: 	Pt is a 63 y/o male here with shortness of breath, fatigue, atypical chest pain.  Found to have COVID.  Lapsed care for hypertension.  Palpitations.  Anxiety disorder?    --Expected pulmonary and systemic symptoms from COVID  --Echo reassuring with normal LVEF.  --Antihypertensives per general cardiology.   --Telemetry monitoring for AFib or SVT.  Would defer invasive management of any arrhythmia until after cleared from COVID isolation.  --Maintain K 4-4.5, Mg 2 via oral route.  --If no arrhythmia identified in hospital, will recommend ambulatory event monitoring.    Jean Jo M.D.  Cardiac Electrophysiology  [phone]     EP Attending    HISTORY OF PRESENT ILLNESS: HPI:  Pt is a 61 y/o male with pmhx of HTN and anxiety (on no medications) who presents to the ER for elevated blood pressure in the setting of 5 days of feeling fatigue and neck pain radiating down his back and R arm. HE reports that it might have started after he lifted a heavy trashbag. No weak or tingling sensation and no worsening on exertion. He denies any chest pain nor shortness of breath and denies any cardiac history. He does endorse mild cough and post nasal drip, otherwise no headache nor fever. No abd pain, diarrhea or dysuria. He has been having a good appetite. He does endorse feeling more anxious and slightly depressed recently due to being alone but denies any suicidal ideation. He reports he was on meds in the past but not recently. In the Er pt was found hypertensive and given amlodipine and hydralazine. Also given a dose of ativan for anxiety.  (04 Sep 2022 22:29)    9/5-  patient has chronic anxiety and tends to avoid medical care. has palpitations on a near-daily basis, but no fainting.  has nonspecific chest discomfort radiating into his arms and neck, but is not sure if this is angina, since it can happen at rest.  no prior known MI or stroke.  no prior known vascular disease.  he presented in hypertensive urgency vs emergency, /110s with chest/neck discomfort.  No dissection on CTA.  sinus rhythm on telemetry. He is essentially a new start to oral antihypertensives after lapsed care for a few years.    A 10 pt ROS is otherwise negative.  9/6- feeling well, able to lie flat, no significant shortness of breath.  no angina or palpitations today.  9/7- resting comfortably. no new complaints.  Date of service 9/8- no new complaints.  resting comfortably, ready to go home.    acetaminophen     Tablet .. 650 milliGRAM(s) Oral every 6 hours PRN  ALBUTerol    90 MICROgram(s) HFA Inhaler 2 Puff(s) Inhalation every 4 hours PRN  amLODIPine   Tablet 10 milliGRAM(s) Oral daily  chlorthalidone 25 milliGRAM(s) Oral daily  heparin   Injectable 5000 Unit(s) SubCutaneous every 8 hours  LORazepam     Tablet 0.5 milliGRAM(s) Oral three times a day PRN  nortriptyline 10 milliGRAM(s) Oral daily  tiZANidine 2 milliGRAM(s) Oral every 8 hours                            15.8   6.20  )-----------( 240      ( 08 Sep 2022 07:04 )             48.8       09-08    140  |  104  |  22  ----------------------------<  106<H>  4.2   |  22  |  1.41<H>    Ca    9.5      08 Sep 2022 07:04  Phos  3.1     09-08  Mg     2.30     09-08    CARDIAC MARKERS ( 06 Sep 2022 08:34 )  x     / x     / 119 U/L / x     / x          T(C): 36.7 (09-08-22 @ 13:25), Max: 37.1 (09-07-22 @ 17:30)  HR: 90 (09-08-22 @ 13:25) (82 - 96)  BP: 149/98 (09-08-22 @ 13:25) (149/98 - 162/92)  RR: 18 (09-08-22 @ 13:25) (17 - 19)  SpO2: 100% (09-08-22 @ 13:25) (98% - 100%)  Wt(kg): --    I&O's Summary    07 Sep 2022 07:01  -  08 Sep 2022 07:00  --------------------------------------------------------  IN: 1700 mL / OUT: 600 mL / NET: 1100 mL    08 Sep 2022 07:01  -  08 Sep 2022 15:21  --------------------------------------------------------  IN: 200 mL / OUT: 0 mL / NET: 200 mL      General: Well nourished, no acute distress, alert and oriented x 3  Head: normocephalic, no trauma  Neck: no JVD, no bruit, supple, not enlarged  CV: S1S2, no S3, regular rate, rhythm is SINUS, no murmurs.    Lungs: clear BL, no rales or wheezes  Abdomen: bowel sounds +, soft, nontender, nondistended  Extremities: no clubbing, cyanosis or edema  Neuro: Moves all 4 extremities, sensation intact x 4 extremities  Skin: warm and moist, normal turgor  Psych: Mood and affect are appropriate for circumstances  MSK: normal range of motion and strength x4 extremities.    TELEMETRY: NSR, no AFib.    ECG:  	NSR , anterior and lateral T wave inversions, PAc's.    < from: CT Angio Chest Aorta w/wo IV Cont (09.04.22 @ 20:02) >    IMPRESSION:    No aortic dissection.  Hepatomegaly. Hepatic steatosis.    < end of copied text >      ASSESSMENT/PLAN: 	Pt is a 61 y/o male here with shortness of breath, fatigue, atypical chest pain.  Found to have COVID.  Lapsed care for hypertension.  Palpitations.  Anxiety disorder?    --Expected pulmonary and systemic symptoms from COVID  --Echo reassuring with normal LVEF.  --Antihypertensives per general cardiology.   --Telemetry monitoring for AFib or SVT.  Would defer invasive management of any arrhythmia until after cleared from COVID isolation.  --Maintain K 4-4.5, Mg 2 via oral route.  --If no arrhythmia identified in hospital, will recommend ambulatory event monitoring.    Jean Jo M.D.  Cardiac Electrophysiology  [phone]

## 2022-09-08 NOTE — CONSULT NOTE ADULT - REASON FOR ADMISSION
hypertensive urgency

## 2022-09-08 NOTE — PROGRESS NOTE ADULT - ASSESSMENT
63 y/o male with pmhx of HTN and anxiety (on no medications) who presents to the ER for elevated blood pressure in the setting of 5 days of feeling fatigued, fevers/chills and neck pain radiating down his back and R arm.    1 Chest pain  - teelmonitor  - cards fu   - no ischemia ochoa     2 Neck pain  - neuro fu appreciated  - warm compresses    3 COVID   - symptomatic care    4 YOANDY  - monitor cr  - renal fu    dc planning in am  61 y/o male with pmhx of HTN and anxiety (on no medications) who presents to the ER for elevated blood pressure in the setting of 5 days of feeling fatigued, fevers/chills and neck pain radiating down his back and R arm.    1 Chest pain  - teelmonitor  - cards fu   - no ischemia ochoa     2 Neck pain  - neuro fu appreciated  - warm compresses    3 COVID   - symptomatic care    4 YOANDY  - monitor cr  - renal fu    dc planning in am

## 2022-09-08 NOTE — PROGRESS NOTE ADULT - SUBJECTIVE AND OBJECTIVE BOX
[facility]      Seen earlier today Date of service   No new neurological symptoms reported. Remains stable neurologically.   - Today, patient is without complaints.          *****MEDICATIONS: Current medication reviewed and documented.    MEDICATIONS  (STANDING):  amLODIPine   Tablet 10 milliGRAM(s) Oral daily  chlorthalidone 25 milliGRAM(s) Oral daily  heparin   Injectable 5000 Unit(s) SubCutaneous every 8 hours  nortriptyline 10 milliGRAM(s) Oral daily  tiZANidine 2 milliGRAM(s) Oral every 8 hours    MEDICATIONS  (PRN):  acetaminophen     Tablet .. 650 milliGRAM(s) Oral every 6 hours PRN Temp greater or equal to 38C (100.4F), Mild Pain (1 - 3)  ALBUTerol    90 MICROgram(s) HFA Inhaler 2 Puff(s) Inhalation every 4 hours PRN Shortness of Breath and/or Wheezing  LORazepam     Tablet 0.5 milliGRAM(s) Oral three times a day PRN Anxiety          ***** VITAL SIGNS:   Vital Signs Last 24 Hrs       T(F): 98.1 (09-08-22 @ 09:25), Max: 98.8 (09-07-22 @ 17:30)  HR: 88 (09-08-22 @ 09:25) (82 - 96)  BP: 151/94 (09-08-22 @ 09:25) (151/94 - 162/92)  RR: 18 (09-08-22 @ 09:25) (17 - 19)  SpO2: 100% (09-08-22 @ 09:25) (98% - 100%)  Wt(kg): --  I&O's Summary    07 Sep 2022 07:01  -  08 Sep 2022 07:00  --------------------------------------------------------  IN: 1700 mL / OUT: 600 mL / NET: 1100 mL             *****PHYSICAL EXAM:   alert oriented x2  attention comprehension are fair.  Able to name, repeat.   EOmi fundi not visualized   no nystagmus VFF to confrontation  Tongue is midline     Moving all 4 ext spontaneously no drift appreciated    Gait not assessed.            *****LAB AND IMAGING:                        15.8   6.20  )-----------( 240      ( 08 Sep 2022 07:04 )             48.8               09-08    140  |  104  |  22  ----------------------------<  106<H>  4.2   |  22  |  1.41<H>    Ca    9.5      08 Sep 2022 07:04  Phos  3.1     09-08  Mg     2.30     09-08                           [All pertinent recent Imaging/Reports reviewed]            *****A S S E S S M E N T   A N D   P L A N :    Excerpt from H&P,"     Pt is a 61 y/o male with pmhx of HTN and anxiety (on no medications) who presents to the ER for elevated blood pressure in the setting of 5 days of feeling fatigue and vague neck pain radiating down his back and R arm. HE reports that it might have started after he lifted a heavy trashbag. No weak or tingling sensation and no worsening on exertion. He denies any chest pain nor shortness of breath and denies any cardiac history. He does endorse mild cough and post nasal drip, otherwise no headache nor fever. No abd pain, diarrhea or dysuria. He has been having a good appetite. He does endorse feeling more anxious and slightly depressed recently due to being alone but denies any suicidal ideation. He reports he was on meds in the past but not recently. In the Er pt was found hypertensive and given amlodipine and hydralazine. Also given a dose of ativan for anxiety.  (04 Sep 2022 22:29)         Problem/Recommendations 1:  neck pain   likely musculoskeletal   warm compress tizanidine  denies any incontinence or saddle anesthesia  exam without any weakness   justin obtain non emergent mri  cspine emg if symptoms persist             Problem/Recommendations 2:    htn   currently started on meds   adjust as necessary       Thank you for allowing me to participate in the care of this patient. Will continue to follow patient periodically. Please do not hesitate to call me if you have any  questions or if there has been a change in patients neurological status     ________________  Teresa Castillo MD  [facility]  [phone]      33 minutes spent on total encounter; more than 50 % of the visit was  spent counseling about plan of care, compliance to diet/exercise and medication regimen and or  coordinating care by the attending physician.      It is advised that stroke patients follow up with EUGENIO Blanca @ [phone] in 1- 2 weeks.   Others please follow up with Dr. Michael Nissenbaum [phone] [facility]      Seen earlier today Date of service   No new neurological symptoms reported. Remains stable neurologically.   - Today, patient is without complaints.          *****MEDICATIONS: Current medication reviewed and documented.    MEDICATIONS  (STANDING):  amLODIPine   Tablet 10 milliGRAM(s) Oral daily  chlorthalidone 25 milliGRAM(s) Oral daily  heparin   Injectable 5000 Unit(s) SubCutaneous every 8 hours  nortriptyline 10 milliGRAM(s) Oral daily  tiZANidine 2 milliGRAM(s) Oral every 8 hours    MEDICATIONS  (PRN):  acetaminophen     Tablet .. 650 milliGRAM(s) Oral every 6 hours PRN Temp greater or equal to 38C (100.4F), Mild Pain (1 - 3)  ALBUTerol    90 MICROgram(s) HFA Inhaler 2 Puff(s) Inhalation every 4 hours PRN Shortness of Breath and/or Wheezing  LORazepam     Tablet 0.5 milliGRAM(s) Oral three times a day PRN Anxiety          ***** VITAL SIGNS:   Vital Signs Last 24 Hrs       T(F): 98.1 (09-08-22 @ 09:25), Max: 98.8 (09-07-22 @ 17:30)  HR: 88 (09-08-22 @ 09:25) (82 - 96)  BP: 151/94 (09-08-22 @ 09:25) (151/94 - 162/92)  RR: 18 (09-08-22 @ 09:25) (17 - 19)  SpO2: 100% (09-08-22 @ 09:25) (98% - 100%)  Wt(kg): --  I&O's Summary    07 Sep 2022 07:01  -  08 Sep 2022 07:00  --------------------------------------------------------  IN: 1700 mL / OUT: 600 mL / NET: 1100 mL             *****PHYSICAL EXAM:   alert oriented x2  attention comprehension are fair.  Able to name, repeat.   EOmi fundi not visualized   no nystagmus VFF to confrontation  Tongue is midline     Moving all 4 ext spontaneously no drift appreciated    Gait not assessed.            *****LAB AND IMAGING:                        15.8   6.20  )-----------( 240      ( 08 Sep 2022 07:04 )             48.8               09-08    140  |  104  |  22  ----------------------------<  106<H>  4.2   |  22  |  1.41<H>    Ca    9.5      08 Sep 2022 07:04  Phos  3.1     09-08  Mg     2.30     09-08                           [All pertinent recent Imaging/Reports reviewed]            *****A S S E S S M E N T   A N D   P L A N :    Excerpt from H&P,"     Pt is a 63 y/o male with pmhx of HTN and anxiety (on no medications) who presents to the ER for elevated blood pressure in the setting of 5 days of feeling fatigue and vague neck pain radiating down his back and R arm. HE reports that it might have started after he lifted a heavy trashbag. No weak or tingling sensation and no worsening on exertion. He denies any chest pain nor shortness of breath and denies any cardiac history. He does endorse mild cough and post nasal drip, otherwise no headache nor fever. No abd pain, diarrhea or dysuria. He has been having a good appetite. He does endorse feeling more anxious and slightly depressed recently due to being alone but denies any suicidal ideation. He reports he was on meds in the past but not recently. In the Er pt was found hypertensive and given amlodipine and hydralazine. Also given a dose of ativan for anxiety.  (04 Sep 2022 22:29)         Problem/Recommendations 1:  neck pain   likely musculoskeletal   warm compress tizanidine  denies any incontinence or saddle anesthesia  exam without any weakness   justin obtain non emergent mri  cspine emg if symptoms persist             Problem/Recommendations 2:    htn   currently started on meds   adjust as necessary       Thank you for allowing me to participate in the care of this patient. Will continue to follow patient periodically. Please do not hesitate to call me if you have any  questions or if there has been a change in patients neurological status     ________________  Teresa Castillo MD  [facility] ([facility]  [phone]      33 minutes spent on total encounter; more than 50 % of the visit was  spent counseling about plan of care, compliance to diet/exercise and medication regimen and or  coordinating care by the attending physician.      It is advised that stroke patients follow up with EUGENIO Blanca @ [phone] in 1- 2 weeks.   Others please follow up with Dr. Michael Nissenbaum [phone] [facility]      Seen earlier today Date of service   No new neurological symptoms reported. Remains stable neurologically.   - Today, patient is without complaints.          *****MEDICATIONS: Current medication reviewed and documented.    MEDICATIONS  (STANDING):  amLODIPine   Tablet 10 milliGRAM(s) Oral daily  chlorthalidone 25 milliGRAM(s) Oral daily  heparin   Injectable 5000 Unit(s) SubCutaneous every 8 hours  nortriptyline 10 milliGRAM(s) Oral daily  tiZANidine 2 milliGRAM(s) Oral every 8 hours    MEDICATIONS  (PRN):  acetaminophen     Tablet .. 650 milliGRAM(s) Oral every 6 hours PRN Temp greater or equal to 38C (100.4F), Mild Pain (1 - 3)  ALBUTerol    90 MICROgram(s) HFA Inhaler 2 Puff(s) Inhalation every 4 hours PRN Shortness of Breath and/or Wheezing  LORazepam     Tablet 0.5 milliGRAM(s) Oral three times a day PRN Anxiety          ***** VITAL SIGNS:   Vital Signs Last 24 Hrs       T(F): 98.1 (09-08-22 @ 09:25), Max: 98.8 (09-07-22 @ 17:30)  HR: 88 (09-08-22 @ 09:25) (82 - 96)  BP: 151/94 (09-08-22 @ 09:25) (151/94 - 162/92)  RR: 18 (09-08-22 @ 09:25) (17 - 19)  SpO2: 100% (09-08-22 @ 09:25) (98% - 100%)  Wt(kg): --  I&O's Summary    07 Sep 2022 07:01  -  08 Sep 2022 07:00  --------------------------------------------------------  IN: 1700 mL / OUT: 600 mL / NET: 1100 mL             *****PHYSICAL EXAM:   alert oriented x2  attention comprehension are fair.  Able to name, repeat.   EOmi fundi not visualized   no nystagmus VFF to confrontation  Tongue is midline     Moving all 4 ext spontaneously no drift appreciated    Gait not assessed.            *****LAB AND IMAGING:                        15.8   6.20  )-----------( 240      ( 08 Sep 2022 07:04 )             48.8               09-08    140  |  104  |  22  ----------------------------<  106<H>  4.2   |  22  |  1.41<H>    Ca    9.5      08 Sep 2022 07:04  Phos  3.1     09-08  Mg     2.30     09-08                           [All pertinent recent Imaging/Reports reviewed]            *****A S S E S S M E N T   A N D   P L A N :    Excerpt from H&P,"     Pt is a 61 y/o male with pmhx of HTN and anxiety (on no medications) who presents to the ER for elevated blood pressure in the setting of 5 days of feeling fatigue and vague neck pain radiating down his back and R arm. HE reports that it might have started after he lifted a heavy trashbag. No weak or tingling sensation and no worsening on exertion. He denies any chest pain nor shortness of breath and denies any cardiac history. He does endorse mild cough and post nasal drip, otherwise no headache nor fever. No abd pain, diarrhea or dysuria. He has been having a good appetite. He does endorse feeling more anxious and slightly depressed recently due to being alone but denies any suicidal ideation. He reports he was on meds in the past but not recently. In the Er pt was found hypertensive and given amlodipine and hydralazine. Also given a dose of ativan for anxiety.  (04 Sep 2022 22:29)         Problem/Recommendations 1:  neck pain   likely musculoskeletal   warm compress tizanidine  denies any incontinence or saddle anesthesia  exam without any weakness   justin obtain non emergent mri  cspine emg if symptoms persist             Problem/Recommendations 2:    htn   currently started on meds   adjust as necessary       Thank you for allowing me to participate in the care of this patient. Will continue to follow patient periodically. Please do not hesitate to call me if you have any  questions or if there has been a change in patients neurological status     ________________  Teresa Castillo MD  [facility] ([facility]  [phone]      33 minutes spent on total encounter; more than 50 % of the visit was  spent counseling about plan of care, compliance to diet/exercise and medication regimen and or  coordinating care by the attending physician.      It is advised that stroke patients follow up with EUGENIO Blanca @ [phone] in 1- 2 weeks.   Others please follow up with Dr. Michael Nissenbaum [phone]

## 2022-09-08 NOTE — PROGRESS NOTE ADULT - SUBJECTIVE AND OBJECTIVE BOX
DATE OF SERVICE: 09-08-22    Patient denies chest pain or shortness of breath.   Review of symptoms otherwise negative.    Review of Systems:   Constitutional: [ ] fevers, [ ] chills.   Skin: [ ] dry skin. [ ] rashes.  Psychiatric: [ ] depression, [ ] anxiety.   Gastrointestinal: [ ] BRBPR, [ ] melena.   Neurological: [ ] confusion. [ ] seizures. [ ] shuffling gait.   Ears,Nose,Mouth and Throat: [ ] ear pain [ ] sore throat.   Eyes: [ ] diplopia.   Respiratory: [ ] hemoptysis. [ ] shortness of breath  Cardiovascular: See HPI above  Hematologic/Lymphatic: [ ] anemia. [ ] painful nodes. [ ] prolonged bleeding.   Genitourinary: [ ] hematuria. [ ] flank pain.   Endocrine: [ ] significant change in weight. [ ] intolerance to heat and cold.     Review of systems [x ] otherwise negative, [ ] otherwise unable to obtain    FH: no family history of sudden cardiac death in first degree relatives    SH: [ ] tobacco, [ ] alcohol, [ ] drugs    acetaminophen     Tablet .. 650 milliGRAM(s) Oral every 6 hours PRN  ALBUTerol    90 MICROgram(s) HFA Inhaler 2 Puff(s) Inhalation every 4 hours PRN  amLODIPine   Tablet 10 milliGRAM(s) Oral daily  chlorthalidone 25 milliGRAM(s) Oral daily  heparin   Injectable 5000 Unit(s) SubCutaneous every 8 hours  LORazepam     Tablet 0.5 milliGRAM(s) Oral three times a day PRN  nortriptyline 10 milliGRAM(s) Oral daily  tiZANidine 2 milliGRAM(s) Oral every 8 hours                            15.8   6.20  )-----------( 240      ( 08 Sep 2022 07:04 )             48.8       09-08    140  |  104  |  22  ----------------------------<  106<H>  4.2   |  22  |  1.41<H>    Ca    9.5      08 Sep 2022 07:04  Phos  3.1     09-08  Mg     2.30     09-08      CARDIAC MARKERS ( 06 Sep 2022 08:34 )  x     / x     / 119 U/L / x     / x          T(C): 36.7 (09-08-22 @ 09:25), Max: 37.1 (09-07-22 @ 17:30)  HR: 88 (09-08-22 @ 09:25) (82 - 96)  BP: 151/94 (09-08-22 @ 09:25) (151/94 - 162/92)  RR: 18 (09-08-22 @ 09:25) (17 - 19)  SpO2: 100% (09-08-22 @ 09:25) (98% - 100%)  Wt(kg): --    I&O's Summary    07 Sep 2022 07:01  -  08 Sep 2022 07:00  --------------------------------------------------------  IN: 1700 mL / OUT: 600 mL / NET: 1100 mL    General: Well nourished in no acute distress. Alert and Oriented * 3.   Head: Normocephalic and atraumatic.   Neck: No JVD. No bruits. Supple. Does not appear to be enlarged.   Cardiovascular: + S1,S2 ; RRR Soft systolic murmur at the left lower sternal border. No rubs noted.    Lungs: CTA b/l. No rhonchi, rales or wheezes.   Abdomen: + BS, soft. Non tender. Non distended. No rebound. No guarding.   Extremities: No clubbing/cyanosis/edema.   Neurologic: Moves all four extremities. Full range of motion.   Skin: Warm and moist. The patient's skin has normal elasticity and good skin turgor.   Psychiatric: Appropriate mood and affect.  Musculoskeletal: Normal range of motion, normal strength       ELEMETRY: SR	      ECG:  	NSR  LVH, PVC    < from: CT Angio Chest Aorta w/wo IV Cont (09.04.22 @ 20:02) >    IMPRESSION:    No aortic dissection.  Hepatomegaly. Hepatic steatosis.    < end of copied text >      < from: Transthoracic Echocardiogram (09.05.22 @ 09:25) >  CONCLUSIONS:  1. Mitral annular calcification, otherwise normal mitral  valve. Minimal mitral regurgitation.  2. Mild concentric left ventricular hypertrophy.  3. Normal left ventricular systolic function. No segmental  wall motion abnormalities.  4. Mild diastolic dysfunction (Stage I).  5. Normal right ventricular size and function.  ------------------------------------------------------------------------  Confirmed on  9/5/2022 - 10:26:00 by Dat Vasquez M.D.,  [facility], Formerly [facility]    < end of copied text >      ASSESSMENT/PLAN: 	Pt is a 63 y/o male with pmhx of HTN and anxiety (on no medications) who presents to the ER for elevated blood pressure in the setting of 5 days of feeling fatigued, fevers/chills and neck pain radiating down his back and R arm.    --Pt is COVID +  --ID eval aprpeciated, no role for antivirals at this time  --CTA chest ruled out aortic dissection  -Neuro consult appreciated, no focal deficits, no need for emergent imaging, started on tizanidine  - BP improving- on Chlorthalidone and Amlodipine 10 mg  - ischemic work-up as outpatient as well as outpatient sleep study  -DC planning  -will arrange for close OP F/U in the office in 2 weeks, [phone]           DATE OF SERVICE: 09-08-22    Patient denies chest pain or shortness of breath.   Review of symptoms otherwise negative.    Review of Systems:   Constitutional: [ ] fevers, [ ] chills.   Skin: [ ] dry skin. [ ] rashes.  Psychiatric: [ ] depression, [ ] anxiety.   Gastrointestinal: [ ] BRBPR, [ ] melena.   Neurological: [ ] confusion. [ ] seizures. [ ] shuffling gait.   Ears,Nose,Mouth and Throat: [ ] ear pain [ ] sore throat.   Eyes: [ ] diplopia.   Respiratory: [ ] hemoptysis. [ ] shortness of breath  Cardiovascular: See HPI above  Hematologic/Lymphatic: [ ] anemia. [ ] painful nodes. [ ] prolonged bleeding.   Genitourinary: [ ] hematuria. [ ] flank pain.   Endocrine: [ ] significant change in weight. [ ] intolerance to heat and cold.     Review of systems [x ] otherwise negative, [ ] otherwise unable to obtain    FH: no family history of sudden cardiac death in first degree relatives    SH: [ ] tobacco, [ ] alcohol, [ ] drugs    acetaminophen     Tablet .. 650 milliGRAM(s) Oral every 6 hours PRN  ALBUTerol    90 MICROgram(s) HFA Inhaler 2 Puff(s) Inhalation every 4 hours PRN  amLODIPine   Tablet 10 milliGRAM(s) Oral daily  chlorthalidone 25 milliGRAM(s) Oral daily  heparin   Injectable 5000 Unit(s) SubCutaneous every 8 hours  LORazepam     Tablet 0.5 milliGRAM(s) Oral three times a day PRN  nortriptyline 10 milliGRAM(s) Oral daily  tiZANidine 2 milliGRAM(s) Oral every 8 hours                            15.8   6.20  )-----------( 240      ( 08 Sep 2022 07:04 )             48.8       09-08    140  |  104  |  22  ----------------------------<  106<H>  4.2   |  22  |  1.41<H>    Ca    9.5      08 Sep 2022 07:04  Phos  3.1     09-08  Mg     2.30     09-08      CARDIAC MARKERS ( 06 Sep 2022 08:34 )  x     / x     / 119 U/L / x     / x          T(C): 36.7 (09-08-22 @ 09:25), Max: 37.1 (09-07-22 @ 17:30)  HR: 88 (09-08-22 @ 09:25) (82 - 96)  BP: 151/94 (09-08-22 @ 09:25) (151/94 - 162/92)  RR: 18 (09-08-22 @ 09:25) (17 - 19)  SpO2: 100% (09-08-22 @ 09:25) (98% - 100%)  Wt(kg): --    I&O's Summary    07 Sep 2022 07:01  -  08 Sep 2022 07:00  --------------------------------------------------------  IN: 1700 mL / OUT: 600 mL / NET: 1100 mL    General: Well nourished in no acute distress. Alert and Oriented * 3.   Head: Normocephalic and atraumatic.   Neck: No JVD. No bruits. Supple. Does not appear to be enlarged.   Cardiovascular: + S1,S2 ; RRR Soft systolic murmur at the left lower sternal border. No rubs noted.    Lungs: CTA b/l. No rhonchi, rales or wheezes.   Abdomen: + BS, soft. Non tender. Non distended. No rebound. No guarding.   Extremities: No clubbing/cyanosis/edema.   Neurologic: Moves all four extremities. Full range of motion.   Skin: Warm and moist. The patient's skin has normal elasticity and good skin turgor.   Psychiatric: Appropriate mood and affect.  Musculoskeletal: Normal range of motion, normal strength       ELEMETRY: SR	      ECG:  	NSR  LVH, PVC    < from: CT Angio Chest Aorta w/wo IV Cont (09.04.22 @ 20:02) >    IMPRESSION:    No aortic dissection.  Hepatomegaly. Hepatic steatosis.    < end of copied text >      < from: Transthoracic Echocardiogram (09.05.22 @ 09:25) >  CONCLUSIONS:  1. Mitral annular calcification, otherwise normal mitral  valve. Minimal mitral regurgitation.  2. Mild concentric left ventricular hypertrophy.  3. Normal left ventricular systolic function. No segmental  wall motion abnormalities.  4. Mild diastolic dysfunction (Stage I).  5. Normal right ventricular size and function.  ------------------------------------------------------------------------  Confirmed on  9/5/2022 - 10:26:00 by Dat Vasquez M.D.,  [facility], Formerly [facility]    < end of copied text >      ASSESSMENT/PLAN: 	Pt is a 63 y/o male with pmhx of HTN and anxiety (on no medications) who presents to the ER for elevated blood pressure in the setting of 5 days of feeling fatigued, fevers/chills and neck pain radiating down his back and R arm.    --Pt is COVID +  --ID eval aprpeciated, no role for antivirals at this time  --CTA chest ruled out aortic dissection  -Neuro consult appreciated, no focal deficits, no need for emergent imaging, started on tizanidine  - BP improving- on Chlorthalidone and Amlodipine 10 mg  - ischemic work-up as outpatient as well as outpatient sleep study  -DC planning  -will arrange for close OP F/U in the office in 2 weeks, [phone]           DATE OF SERVICE: 09-08-22    Patient denies chest pain or shortness of breath.   Review of symptoms otherwise negative.    Review of Systems:   Constitutional: [ ] fevers, [ ] chills.   Skin: [ ] dry skin. [ ] rashes.  Psychiatric: [ ] depression, [ ] anxiety.   Gastrointestinal: [ ] BRBPR, [ ] melena.   Neurological: [ ] confusion. [ ] seizures. [ ] shuffling gait.   Ears,Nose,Mouth and Throat: [ ] ear pain [ ] sore throat.   Eyes: [ ] diplopia.   Respiratory: [ ] hemoptysis. [ ] shortness of breath  Cardiovascular: See HPI above  Hematologic/Lymphatic: [ ] anemia. [ ] painful nodes. [ ] prolonged bleeding.   Genitourinary: [ ] hematuria. [ ] flank pain.   Endocrine: [ ] significant change in weight. [ ] intolerance to heat and cold.     Review of systems [x ] otherwise negative, [ ] otherwise unable to obtain    FH: no family history of sudden cardiac death in first degree relatives    SH: [ ] tobacco, [ ] alcohol, [ ] drugs    acetaminophen     Tablet .. 650 milliGRAM(s) Oral every 6 hours PRN  ALBUTerol    90 MICROgram(s) HFA Inhaler 2 Puff(s) Inhalation every 4 hours PRN  amLODIPine   Tablet 10 milliGRAM(s) Oral daily  chlorthalidone 25 milliGRAM(s) Oral daily  heparin   Injectable 5000 Unit(s) SubCutaneous every 8 hours  LORazepam     Tablet 0.5 milliGRAM(s) Oral three times a day PRN  nortriptyline 10 milliGRAM(s) Oral daily  tiZANidine 2 milliGRAM(s) Oral every 8 hours                            15.8   6.20  )-----------( 240      ( 08 Sep 2022 07:04 )             48.8       09-08    140  |  104  |  22  ----------------------------<  106<H>  4.2   |  22  |  1.41<H>    Ca    9.5      08 Sep 2022 07:04  Phos  3.1     09-08  Mg     2.30     09-08      CARDIAC MARKERS ( 06 Sep 2022 08:34 )  x     / x     / 119 U/L / x     / x          T(C): 36.7 (09-08-22 @ 09:25), Max: 37.1 (09-07-22 @ 17:30)  HR: 88 (09-08-22 @ 09:25) (82 - 96)  BP: 151/94 (09-08-22 @ 09:25) (151/94 - 162/92)  RR: 18 (09-08-22 @ 09:25) (17 - 19)  SpO2: 100% (09-08-22 @ 09:25) (98% - 100%)  Wt(kg): --    I&O's Summary    07 Sep 2022 07:01  -  08 Sep 2022 07:00  --------------------------------------------------------  IN: 1700 mL / OUT: 600 mL / NET: 1100 mL    General: Well nourished in no acute distress. Alert and Oriented * 3.   Head: Normocephalic and atraumatic.   Neck: No JVD. No bruits. Supple. Does not appear to be enlarged.   Cardiovascular: + S1,S2 ; RRR Soft systolic murmur at the left lower sternal border. No rubs noted.    Lungs: CTA b/l. No rhonchi, rales or wheezes.   Abdomen: + BS, soft. Non tender. Non distended. No rebound. No guarding.   Extremities: No clubbing/cyanosis/edema.   Neurologic: Moves all four extremities. Full range of motion.   Skin: Warm and moist. The patient's skin has normal elasticity and good skin turgor.   Psychiatric: Appropriate mood and affect.  Musculoskeletal: Normal range of motion, normal strength       ELEMETRY: SR	      ECG:  	NSR  LVH, PVC    < from: CT Angio Chest Aorta w/wo IV Cont (09.04.22 @ 20:02) >    IMPRESSION:    No aortic dissection.  Hepatomegaly. Hepatic steatosis.    < end of copied text >      < from: Transthoracic Echocardiogram (09.05.22 @ 09:25) >  CONCLUSIONS:  1. Mitral annular calcification, otherwise normal mitral  valve. Minimal mitral regurgitation.  2. Mild concentric left ventricular hypertrophy.  3. Normal left ventricular systolic function. No segmental  wall motion abnormalities.  4. Mild diastolic dysfunction (Stage I).  5. Normal right ventricular size and function.  ------------------------------------------------------------------------  Confirmed on  9/5/2022 - 10:26:00 by Dat Vasquez M.D.,  [facility], [facility]    < end of copied text >      ASSESSMENT/PLAN: 	Pt is a 63 y/o male with pmhx of HTN and anxiety (on no medications) who presents to the ER for elevated blood pressure in the setting of 5 days of feeling fatigued, fevers/chills and neck pain radiating down his back and R arm.    --Pt is COVID +  --ID eval aprpeciated, no role for antivirals at this time  --CTA chest ruled out aortic dissection  -Neuro consult appreciated, no focal deficits, no need for emergent imaging, started on tizanidine  - BP improving- on Chlorthalidone and Amlodipine 10 mg  - ischemic work-up as outpatient as well as outpatient sleep study  -DC planning  -will arrange for close OP F/U in the office in 2 weeks, [phone]           DATE OF SERVICE: 09-08-22    Patient denies chest pain or shortness of breath.   Review of symptoms otherwise negative.    Review of Systems:   Constitutional: [ ] fevers, [ ] chills.   Skin: [ ] dry skin. [ ] rashes.  Psychiatric: [ ] depression, [ ] anxiety.   Gastrointestinal: [ ] BRBPR, [ ] melena.   Neurological: [ ] confusion. [ ] seizures. [ ] shuffling gait.   Ears,Nose,Mouth and Throat: [ ] ear pain [ ] sore throat.   Eyes: [ ] diplopia.   Respiratory: [ ] hemoptysis. [ ] shortness of breath  Cardiovascular: See HPI above  Hematologic/Lymphatic: [ ] anemia. [ ] painful nodes. [ ] prolonged bleeding.   Genitourinary: [ ] hematuria. [ ] flank pain.   Endocrine: [ ] significant change in weight. [ ] intolerance to heat and cold.     Review of systems [x ] otherwise negative, [ ] otherwise unable to obtain    FH: no family history of sudden cardiac death in first degree relatives    SH: [ ] tobacco, [ ] alcohol, [ ] drugs    acetaminophen     Tablet .. 650 milliGRAM(s) Oral every 6 hours PRN  ALBUTerol    90 MICROgram(s) HFA Inhaler 2 Puff(s) Inhalation every 4 hours PRN  amLODIPine   Tablet 10 milliGRAM(s) Oral daily  chlorthalidone 25 milliGRAM(s) Oral daily  heparin   Injectable 5000 Unit(s) SubCutaneous every 8 hours  LORazepam     Tablet 0.5 milliGRAM(s) Oral three times a day PRN  nortriptyline 10 milliGRAM(s) Oral daily  tiZANidine 2 milliGRAM(s) Oral every 8 hours                            15.8   6.20  )-----------( 240      ( 08 Sep 2022 07:04 )             48.8       09-08    140  |  104  |  22  ----------------------------<  106<H>  4.2   |  22  |  1.41<H>    Ca    9.5      08 Sep 2022 07:04  Phos  3.1     09-08  Mg     2.30     09-08      CARDIAC MARKERS ( 06 Sep 2022 08:34 )  x     / x     / 119 U/L / x     / x          T(C): 36.7 (09-08-22 @ 09:25), Max: 37.1 (09-07-22 @ 17:30)  HR: 88 (09-08-22 @ 09:25) (82 - 96)  BP: 151/94 (09-08-22 @ 09:25) (151/94 - 162/92)  RR: 18 (09-08-22 @ 09:25) (17 - 19)  SpO2: 100% (09-08-22 @ 09:25) (98% - 100%)  Wt(kg): --    I&O's Summary    07 Sep 2022 07:01  -  08 Sep 2022 07:00  --------------------------------------------------------  IN: 1700 mL / OUT: 600 mL / NET: 1100 mL    General: Well nourished in no acute distress. Alert and Oriented * 3.   Head: Normocephalic and atraumatic.   Neck: No JVD. No bruits. Supple. Does not appear to be enlarged.   Cardiovascular: + S1,S2 ; RRR Soft systolic murmur at the left lower sternal border. No rubs noted.    Lungs: CTA b/l. No rhonchi, rales or wheezes.   Abdomen: + BS, soft. Non tender. Non distended. No rebound. No guarding.   Extremities: No clubbing/cyanosis/edema.   Neurologic: Moves all four extremities. Full range of motion.   Skin: Warm and moist. The patient's skin has normal elasticity and good skin turgor.   Psychiatric: Appropriate mood and affect.  Musculoskeletal: Normal range of motion, normal strength       ELEMETRY: SR	      ECG:  	NSR  LVH, PVC    < from: CT Angio Chest Aorta w/wo IV Cont (09.04.22 @ 20:02) >    IMPRESSION:    No aortic dissection.  Hepatomegaly. Hepatic steatosis.    < end of copied text >      < from: Transthoracic Echocardiogram (09.05.22 @ 09:25) >  CONCLUSIONS:  1. Mitral annular calcification, otherwise normal mitral  valve. Minimal mitral regurgitation.  2. Mild concentric left ventricular hypertrophy.  3. Normal left ventricular systolic function. No segmental  wall motion abnormalities.  4. Mild diastolic dysfunction (Stage I).  5. Normal right ventricular size and function.  ------------------------------------------------------------------------  Confirmed on  9/5/2022 - 10:26:00 by Dat Vasquez M.D.,  [facility], [facility]    < end of copied text >      ASSESSMENT/PLAN: 	Pt is a 63 y/o male with pmhx of HTN and anxiety (on no medications) who presents to the ER for elevated blood pressure in the setting of 5 days of feeling fatigued, fevers/chills and neck pain radiating down his back and R arm.    --Pt is COVID +  --ID eval aprpeciated, no role for antivirals at this time  --CTA chest ruled out aortic dissection  -Neuro consult appreciated, no focal deficits, no need for emergent imaging, started on tizanidine  - BP improving- on Chlorthalidone and Amlodipine 10 mg  - ischemic work-up as outpatient as well as outpatient sleep study  -will arrange for close OP F/U in the office in 2 weeks, [phone]           DATE OF SERVICE: 09-08-22    Patient denies chest pain or shortness of breath.   Review of symptoms otherwise negative.    Review of Systems:   Constitutional: [ ] fevers, [ ] chills.   Skin: [ ] dry skin. [ ] rashes.  Psychiatric: [ ] depression, [ ] anxiety.   Gastrointestinal: [ ] BRBPR, [ ] melena.   Neurological: [ ] confusion. [ ] seizures. [ ] shuffling gait.   Ears,Nose,Mouth and Throat: [ ] ear pain [ ] sore throat.   Eyes: [ ] diplopia.   Respiratory: [ ] hemoptysis. [ ] shortness of breath  Cardiovascular: See HPI above  Hematologic/Lymphatic: [ ] anemia. [ ] painful nodes. [ ] prolonged bleeding.   Genitourinary: [ ] hematuria. [ ] flank pain.   Endocrine: [ ] significant change in weight. [ ] intolerance to heat and cold.     Review of systems [x ] otherwise negative, [ ] otherwise unable to obtain    FH: no family history of sudden cardiac death in first degree relatives    SH: [ ] tobacco, [ ] alcohol, [ ] drugs    acetaminophen     Tablet .. 650 milliGRAM(s) Oral every 6 hours PRN  ALBUTerol    90 MICROgram(s) HFA Inhaler 2 Puff(s) Inhalation every 4 hours PRN  amLODIPine   Tablet 10 milliGRAM(s) Oral daily  chlorthalidone 25 milliGRAM(s) Oral daily  heparin   Injectable 5000 Unit(s) SubCutaneous every 8 hours  LORazepam     Tablet 0.5 milliGRAM(s) Oral three times a day PRN  nortriptyline 10 milliGRAM(s) Oral daily  tiZANidine 2 milliGRAM(s) Oral every 8 hours                            15.8   6.20  )-----------( 240      ( 08 Sep 2022 07:04 )             48.8       09-08    140  |  104  |  22  ----------------------------<  106<H>  4.2   |  22  |  1.41<H>    Ca    9.5      08 Sep 2022 07:04  Phos  3.1     09-08  Mg     2.30     09-08      CARDIAC MARKERS ( 06 Sep 2022 08:34 )  x     / x     / 119 U/L / x     / x          T(C): 36.7 (09-08-22 @ 09:25), Max: 37.1 (09-07-22 @ 17:30)  HR: 88 (09-08-22 @ 09:25) (82 - 96)  BP: 151/94 (09-08-22 @ 09:25) (151/94 - 162/92)  RR: 18 (09-08-22 @ 09:25) (17 - 19)  SpO2: 100% (09-08-22 @ 09:25) (98% - 100%)  Wt(kg): --    I&O's Summary    07 Sep 2022 07:01  -  08 Sep 2022 07:00  --------------------------------------------------------  IN: 1700 mL / OUT: 600 mL / NET: 1100 mL    General: Well nourished in no acute distress. Alert and Oriented * 3.   Head: Normocephalic and atraumatic.   Neck: No JVD. No bruits. Supple. Does not appear to be enlarged.   Cardiovascular: + S1,S2 ; RRR Soft systolic murmur at the left lower sternal border. No rubs noted.    Lungs: CTA b/l. No rhonchi, rales or wheezes.   Abdomen: + BS, soft. Non tender. Non distended. No rebound. No guarding.   Extremities: No clubbing/cyanosis/edema.   Neurologic: Moves all four extremities. Full range of motion.   Skin: Warm and moist. The patient's skin has normal elasticity and good skin turgor.   Psychiatric: Appropriate mood and affect.  Musculoskeletal: Normal range of motion, normal strength       ELEMETRY: SR	      ECG:  	NSR  LVH, PVC    < from: CT Angio Chest Aorta w/wo IV Cont (09.04.22 @ 20:02) >    IMPRESSION:    No aortic dissection.  Hepatomegaly. Hepatic steatosis.    < end of copied text >      < from: Transthoracic Echocardiogram (09.05.22 @ 09:25) >  CONCLUSIONS:  1. Mitral annular calcification, otherwise normal mitral  valve. Minimal mitral regurgitation.  2. Mild concentric left ventricular hypertrophy.  3. Normal left ventricular systolic function. No segmental  wall motion abnormalities.  4. Mild diastolic dysfunction (Stage I).  5. Normal right ventricular size and function.  ------------------------------------------------------------------------  Confirmed on  9/5/2022 - 10:26:00 by Dat Vasquez M.D.,  [facility], [facility]    < end of copied text >      ASSESSMENT/PLAN: 	Pt is a 63 y/o male with pmhx of HTN and anxiety (on no medications) who presents to the ER for elevated blood pressure in the setting of 5 days of feeling fatigued, fevers/chills and neck pain radiating down his back and R arm.    --Pt is COVID +  --ID eval aprpeciated, no role for antivirals at this time  --CTA chest ruled out aortic dissection  -Neuro consult appreciated, no focal deficits, no need for emergent imaging, started on tizanidine  - BP improving- on Chlorthalidone and Amlodipine 10 mg  - ischemic work-up as outpatient as well as outpatient sleep study  -will arrange for close OP F/U in the office in 2 weeks, [phone]           DATE OF SERVICE: 09-08-22    Patient denies chest pain or shortness of breath.   Review of symptoms otherwise negative.    Review of Systems:   Constitutional: [ ] fevers, [ ] chills.   Skin: [ ] dry skin. [ ] rashes.  Psychiatric: [ ] depression, [ ] anxiety.   Gastrointestinal: [ ] BRBPR, [ ] melena.   Neurological: [ ] confusion. [ ] seizures. [ ] shuffling gait.   Ears,Nose,Mouth and Throat: [ ] ear pain [ ] sore throat.   Eyes: [ ] diplopia.   Respiratory: [ ] hemoptysis. [ ] shortness of breath  Cardiovascular: See HPI above  Hematologic/Lymphatic: [ ] anemia. [ ] painful nodes. [ ] prolonged bleeding.   Genitourinary: [ ] hematuria. [ ] flank pain.   Endocrine: [ ] significant change in weight. [ ] intolerance to heat and cold.     Review of systems [x ] otherwise negative, [ ] otherwise unable to obtain    FH: no family history of sudden cardiac death in first degree relatives    SH: [ ] tobacco, [ ] alcohol, [ ] drugs    acetaminophen     Tablet .. 650 milliGRAM(s) Oral every 6 hours PRN  ALBUTerol    90 MICROgram(s) HFA Inhaler 2 Puff(s) Inhalation every 4 hours PRN  amLODIPine   Tablet 10 milliGRAM(s) Oral daily  chlorthalidone 25 milliGRAM(s) Oral daily  heparin   Injectable 5000 Unit(s) SubCutaneous every 8 hours  LORazepam     Tablet 0.5 milliGRAM(s) Oral three times a day PRN  nortriptyline 10 milliGRAM(s) Oral daily  tiZANidine 2 milliGRAM(s) Oral every 8 hours                            15.8   6.20  )-----------( 240      ( 08 Sep 2022 07:04 )             48.8       09-08    140  |  104  |  22  ----------------------------<  106<H>  4.2   |  22  |  1.41<H>    Ca    9.5      08 Sep 2022 07:04  Phos  3.1     09-08  Mg     2.30     09-08      CARDIAC MARKERS ( 06 Sep 2022 08:34 )  x     / x     / 119 U/L / x     / x          T(C): 36.7 (09-08-22 @ 09:25), Max: 37.1 (09-07-22 @ 17:30)  HR: 88 (09-08-22 @ 09:25) (82 - 96)  BP: 151/94 (09-08-22 @ 09:25) (151/94 - 162/92)  RR: 18 (09-08-22 @ 09:25) (17 - 19)  SpO2: 100% (09-08-22 @ 09:25) (98% - 100%)  Wt(kg): --    I&O's Summary    07 Sep 2022 07:01  -  08 Sep 2022 07:00  --------------------------------------------------------  IN: 1700 mL / OUT: 600 mL / NET: 1100 mL    General: Well nourished in no acute distress. Alert and Oriented * 3.   Head: Normocephalic and atraumatic.   Neck: No JVD. No bruits. Supple. Does not appear to be enlarged.   Cardiovascular: + S1,S2 ; RRR Soft systolic murmur at the left lower sternal border. No rubs noted.    Lungs: CTA b/l. No rhonchi, rales or wheezes.   Abdomen: + BS, soft. Non tender. Non distended. No rebound. No guarding.   Extremities: No clubbing/cyanosis/edema.   Neurologic: Moves all four extremities. Full range of motion.   Skin: Warm and moist. The patient's skin has normal elasticity and good skin turgor.   Psychiatric: Appropriate mood and affect.  Musculoskeletal: Normal range of motion, normal strength       ELEMETRY: SR	      ECG:  	NSR  LVH, PVC    < from: CT Angio Chest Aorta w/wo IV Cont (09.04.22 @ 20:02) >    IMPRESSION:    No aortic dissection.  Hepatomegaly. Hepatic steatosis.    < end of copied text >      < from: Transthoracic Echocardiogram (09.05.22 @ 09:25) >  CONCLUSIONS:  1. Mitral annular calcification, otherwise normal mitral  valve. Minimal mitral regurgitation.  2. Mild concentric left ventricular hypertrophy.  3. Normal left ventricular systolic function. No segmental  wall motion abnormalities.  4. Mild diastolic dysfunction (Stage I).  5. Normal right ventricular size and function.  ------------------------------------------------------------------------  Confirmed on  9/5/2022 - 10:26:00 by Dat Vasquez M.D.,  [facility], [facility]    < end of copied text >      ASSESSMENT/PLAN: 	Pt is a 63 y/o male with pmhx of HTN and anxiety (on no medications) who presents to the ER for elevated blood pressure in the setting of 5 days of feeling fatigued, fevers/chills and neck pain radiating down his back and R arm.    --Pt is COVID +  --ID eval aprpeciated, no role for antivirals at this time  --CTA chest ruled out aortic dissection  -Neuro consult appreciated, no focal deficits, no need for emergent imaging, started on tizanidine  - BP improving- on Chlorthalidone and Amlodipine 10 mg  - ischemic work-up as outpatient as well as outpatient sleep study  -will arrange for close OP F/U in the office in 2 weeks, [phone]           DATE OF SERVICE: 09-08-22    Patient denies chest pain or shortness of breath.   Review of symptoms otherwise negative.    Review of Systems:   Constitutional: [ ] fevers, [ ] chills.   Skin: [ ] dry skin. [ ] rashes.  Psychiatric: [ ] depression, [ ] anxiety.   Gastrointestinal: [ ] BRBPR, [ ] melena.   Neurological: [ ] confusion. [ ] seizures. [ ] shuffling gait.   Ears,Nose,Mouth and Throat: [ ] ear pain [ ] sore throat.   Eyes: [ ] diplopia.   Respiratory: [ ] hemoptysis. [ ] shortness of breath  Cardiovascular: See HPI above  Hematologic/Lymphatic: [ ] anemia. [ ] painful nodes. [ ] prolonged bleeding.   Genitourinary: [ ] hematuria. [ ] flank pain.   Endocrine: [ ] significant change in weight. [ ] intolerance to heat and cold.     Review of systems [x ] otherwise negative, [ ] otherwise unable to obtain    FH: no family history of sudden cardiac death in first degree relatives    SH: [ ] tobacco, [ ] alcohol, [ ] drugs    acetaminophen     Tablet .. 650 milliGRAM(s) Oral every 6 hours PRN  ALBUTerol    90 MICROgram(s) HFA Inhaler 2 Puff(s) Inhalation every 4 hours PRN  amLODIPine   Tablet 10 milliGRAM(s) Oral daily  chlorthalidone 25 milliGRAM(s) Oral daily  heparin   Injectable 5000 Unit(s) SubCutaneous every 8 hours  LORazepam     Tablet 0.5 milliGRAM(s) Oral three times a day PRN  nortriptyline 10 milliGRAM(s) Oral daily  tiZANidine 2 milliGRAM(s) Oral every 8 hours                            15.8   6.20  )-----------( 240      ( 08 Sep 2022 07:04 )             48.8       09-08    140  |  104  |  22  ----------------------------<  106<H>  4.2   |  22  |  1.41<H>    Ca    9.5      08 Sep 2022 07:04  Phos  3.1     09-08  Mg     2.30     09-08      CARDIAC MARKERS ( 06 Sep 2022 08:34 )  x     / x     / 119 U/L / x     / x          T(C): 36.7 (09-08-22 @ 09:25), Max: 37.1 (09-07-22 @ 17:30)  HR: 88 (09-08-22 @ 09:25) (82 - 96)  BP: 151/94 (09-08-22 @ 09:25) (151/94 - 162/92)  RR: 18 (09-08-22 @ 09:25) (17 - 19)  SpO2: 100% (09-08-22 @ 09:25) (98% - 100%)  Wt(kg): --    I&O's Summary    07 Sep 2022 07:01  -  08 Sep 2022 07:00  --------------------------------------------------------  IN: 1700 mL / OUT: 600 mL / NET: 1100 mL    General: Well nourished in no acute distress. Alert and Oriented * 3.   Head: Normocephalic and atraumatic.   Neck: No JVD. No bruits. Supple. Does not appear to be enlarged.   Cardiovascular: + S1,S2 ; RRR Soft systolic murmur at the left lower sternal border. No rubs noted.    Lungs: CTA b/l. No rhonchi, rales or wheezes.   Abdomen: + BS, soft. Non tender. Non distended. No rebound. No guarding.   Extremities: No clubbing/cyanosis/edema.   Neurologic: Moves all four extremities. Full range of motion.   Skin: Warm and moist. The patient's skin has normal elasticity and good skin turgor.   Psychiatric: Appropriate mood and affect.  Musculoskeletal: Normal range of motion, normal strength       ELEMETRY: SR	      ECG:  	NSR  LVH, PVC    < from: CT Angio Chest Aorta w/wo IV Cont (09.04.22 @ 20:02) >    IMPRESSION:    No aortic dissection.  Hepatomegaly. Hepatic steatosis.    < end of copied text >      < from: Transthoracic Echocardiogram (09.05.22 @ 09:25) >  CONCLUSIONS:  1. Mitral annular calcification, otherwise normal mitral  valve. Minimal mitral regurgitation.  2. Mild concentric left ventricular hypertrophy.  3. Normal left ventricular systolic function. No segmental  wall motion abnormalities.  4. Mild diastolic dysfunction (Stage I).  5. Normal right ventricular size and function.  ------------------------------------------------------------------------  Confirmed on  9/5/2022 - 10:26:00 by Dat Vasquez M.D.,  [facility], [facility] access hospital    < end of copied text >      ASSESSMENT/PLAN: 	Pt is a 63 y/o male with pmhx of HTN and anxiety (on no medications) who presents to the ER for elevated blood pressure in the setting of 5 days of feeling fatigued, fevers/chills and neck pain radiating down his back and R arm.    --Pt is COVID +  --ID eval aprpeciated, no role for antivirals at this time  --CTA chest ruled out aortic dissection  -Neuro consult appreciated, no focal deficits, no need for emergent imaging, started on tizanidine  - BP improving- on Chlorthalidone and Amlodipine 10 mg  - ischemic work-up as outpatient as well as outpatient sleep study  -will arrange for close OP F/U in the office in 2 weeks with Dr Cadena 9/20 at 1:00PM,  [phone]           DATE OF SERVICE: 09-08-22    Patient denies chest pain or shortness of breath.   Review of symptoms otherwise negative.    Review of Systems:   Constitutional: [ ] fevers, [ ] chills.   Skin: [ ] dry skin. [ ] rashes.  Psychiatric: [ ] depression, [ ] anxiety.   Gastrointestinal: [ ] BRBPR, [ ] melena.   Neurological: [ ] confusion. [ ] seizures. [ ] shuffling gait.   Ears,Nose,Mouth and Throat: [ ] ear pain [ ] sore throat.   Eyes: [ ] diplopia.   Respiratory: [ ] hemoptysis. [ ] shortness of breath  Cardiovascular: See HPI above  Hematologic/Lymphatic: [ ] anemia. [ ] painful nodes. [ ] prolonged bleeding.   Genitourinary: [ ] hematuria. [ ] flank pain.   Endocrine: [ ] significant change in weight. [ ] intolerance to heat and cold.     Review of systems [x ] otherwise negative, [ ] otherwise unable to obtain    FH: no family history of sudden cardiac death in first degree relatives    SH: [ ] tobacco, [ ] alcohol, [ ] drugs    acetaminophen     Tablet .. 650 milliGRAM(s) Oral every 6 hours PRN  ALBUTerol    90 MICROgram(s) HFA Inhaler 2 Puff(s) Inhalation every 4 hours PRN  amLODIPine   Tablet 10 milliGRAM(s) Oral daily  chlorthalidone 25 milliGRAM(s) Oral daily  heparin   Injectable 5000 Unit(s) SubCutaneous every 8 hours  LORazepam     Tablet 0.5 milliGRAM(s) Oral three times a day PRN  nortriptyline 10 milliGRAM(s) Oral daily  tiZANidine 2 milliGRAM(s) Oral every 8 hours                            15.8   6.20  )-----------( 240      ( 08 Sep 2022 07:04 )             48.8       09-08    140  |  104  |  22  ----------------------------<  106<H>  4.2   |  22  |  1.41<H>    Ca    9.5      08 Sep 2022 07:04  Phos  3.1     09-08  Mg     2.30     09-08      CARDIAC MARKERS ( 06 Sep 2022 08:34 )  x     / x     / 119 U/L / x     / x          T(C): 36.7 (09-08-22 @ 09:25), Max: 37.1 (09-07-22 @ 17:30)  HR: 88 (09-08-22 @ 09:25) (82 - 96)  BP: 151/94 (09-08-22 @ 09:25) (151/94 - 162/92)  RR: 18 (09-08-22 @ 09:25) (17 - 19)  SpO2: 100% (09-08-22 @ 09:25) (98% - 100%)  Wt(kg): --    I&O's Summary    07 Sep 2022 07:01  -  08 Sep 2022 07:00  --------------------------------------------------------  IN: 1700 mL / OUT: 600 mL / NET: 1100 mL    General: Well nourished in no acute distress. Alert and Oriented * 3.   Head: Normocephalic and atraumatic.   Neck: No JVD. No bruits. Supple. Does not appear to be enlarged.   Cardiovascular: + S1,S2 ; RRR Soft systolic murmur at the left lower sternal border. No rubs noted.    Lungs: CTA b/l. No rhonchi, rales or wheezes.   Abdomen: + BS, soft. Non tender. Non distended. No rebound. No guarding.   Extremities: No clubbing/cyanosis/edema.   Neurologic: Moves all four extremities. Full range of motion.   Skin: Warm and moist. The patient's skin has normal elasticity and good skin turgor.   Psychiatric: Appropriate mood and affect.  Musculoskeletal: Normal range of motion, normal strength       ELEMETRY: SR	      ECG:  	NSR  LVH, PVC    < from: CT Angio Chest Aorta w/wo IV Cont (09.04.22 @ 20:02) >    IMPRESSION:    No aortic dissection.  Hepatomegaly. Hepatic steatosis.    < end of copied text >      < from: Transthoracic Echocardiogram (09.05.22 @ 09:25) >  CONCLUSIONS:  1. Mitral annular calcification, otherwise normal mitral  valve. Minimal mitral regurgitation.  2. Mild concentric left ventricular hypertrophy.  3. Normal left ventricular systolic function. No segmental  wall motion abnormalities.  4. Mild diastolic dysfunction (Stage I).  5. Normal right ventricular size and function.  ------------------------------------------------------------------------  Confirmed on  9/5/2022 - 10:26:00 by Dat Vasquez M.D.,  [facility], [facility] hospital    < end of copied text >      ASSESSMENT/PLAN: 	Pt is a 61 y/o male with pmhx of HTN and anxiety (on no medications) who presents to the ER for elevated blood pressure in the setting of 5 days of feeling fatigued, fevers/chills and neck pain radiating down his back and R arm.    --Pt is COVID +  --ID eval aprpeciated, no role for antivirals at this time  --CTA chest ruled out aortic dissection  -Neuro consult appreciated, no focal deficits, no need for emergent imaging, started on tizanidine  - BP improving- on Chlorthalidone and Amlodipine 10 mg  - ischemic work-up as outpatient as well as outpatient sleep study  -will arrange for close OP F/U in the office in 2 weeks with Dr Cadena 9/20 at 1:00PM,  [phone]           DATE OF SERVICE: 09-08-22    Patient denies chest pain or shortness of breath.   Review of symptoms otherwise negative.    Review of Systems:   Constitutional: [ ] fevers, [ ] chills.   Skin: [ ] dry skin. [ ] rashes.  Psychiatric: [ ] depression, [ ] anxiety.   Gastrointestinal: [ ] BRBPR, [ ] melena.   Neurological: [ ] confusion. [ ] seizures. [ ] shuffling gait.   Ears,Nose,Mouth and Throat: [ ] ear pain [ ] sore throat.   Eyes: [ ] diplopia.   Respiratory: [ ] hemoptysis. [ ] shortness of breath  Cardiovascular: See HPI above  Hematologic/Lymphatic: [ ] anemia. [ ] painful nodes. [ ] prolonged bleeding.   Genitourinary: [ ] hematuria. [ ] flank pain.   Endocrine: [ ] significant change in weight. [ ] intolerance to heat and cold.     Review of systems [x ] otherwise negative, [ ] otherwise unable to obtain    FH: no family history of sudden cardiac death in first degree relatives    SH: [ ] tobacco, [ ] alcohol, [ ] drugs    acetaminophen     Tablet .. 650 milliGRAM(s) Oral every 6 hours PRN  ALBUTerol    90 MICROgram(s) HFA Inhaler 2 Puff(s) Inhalation every 4 hours PRN  amLODIPine   Tablet 10 milliGRAM(s) Oral daily  chlorthalidone 25 milliGRAM(s) Oral daily  heparin   Injectable 5000 Unit(s) SubCutaneous every 8 hours  LORazepam     Tablet 0.5 milliGRAM(s) Oral three times a day PRN  nortriptyline 10 milliGRAM(s) Oral daily  tiZANidine 2 milliGRAM(s) Oral every 8 hours                            15.8   6.20  )-----------( 240      ( 08 Sep 2022 07:04 )             48.8       09-08    140  |  104  |  22  ----------------------------<  106<H>  4.2   |  22  |  1.41<H>    Ca    9.5      08 Sep 2022 07:04  Phos  3.1     09-08  Mg     2.30     09-08      CARDIAC MARKERS ( 06 Sep 2022 08:34 )  x     / x     / 119 U/L / x     / x          T(C): 36.7 (09-08-22 @ 09:25), Max: 37.1 (09-07-22 @ 17:30)  HR: 88 (09-08-22 @ 09:25) (82 - 96)  BP: 151/94 (09-08-22 @ 09:25) (151/94 - 162/92)  RR: 18 (09-08-22 @ 09:25) (17 - 19)  SpO2: 100% (09-08-22 @ 09:25) (98% - 100%)  Wt(kg): --    I&O's Summary    07 Sep 2022 07:01  -  08 Sep 2022 07:00  --------------------------------------------------------  IN: 1700 mL / OUT: 600 mL / NET: 1100 mL    General: Well nourished in no acute distress. Alert and Oriented * 3.   Head: Normocephalic and atraumatic.   Neck: No JVD. No bruits. Supple. Does not appear to be enlarged.   Cardiovascular: + S1,S2 ; RRR Soft systolic murmur at the left lower sternal border. No rubs noted.    Lungs: CTA b/l. No rhonchi, rales or wheezes.   Abdomen: + BS, soft. Non tender. Non distended. No rebound. No guarding.   Extremities: No clubbing/cyanosis/edema.   Neurologic: Moves all four extremities. Full range of motion.   Skin: Warm and moist. The patient's skin has normal elasticity and good skin turgor.   Psychiatric: Appropriate mood and affect.  Musculoskeletal: Normal range of motion, normal strength       ELEMETRY: SR	      ECG:  	NSR  LVH, PVC    < from: CT Angio Chest Aorta w/wo IV Cont (09.04.22 @ 20:02) >    IMPRESSION:    No aortic dissection.  Hepatomegaly. Hepatic steatosis.    < end of copied text >      < from: Transthoracic Echocardiogram (09.05.22 @ 09:25) >  CONCLUSIONS:  1. Mitral annular calcification, otherwise normal mitral  valve. Minimal mitral regurgitation.  2. Mild concentric left ventricular hypertrophy.  3. Normal left ventricular systolic function. No segmental  wall motion abnormalities.  4. Mild diastolic dysfunction (Stage I).  5. Normal right ventricular size and function.  ------------------------------------------------------------------------  Confirmed on  9/5/2022 - 10:26:00 by Dat Vasquez M.D.,  [facility], [facility]    < end of copied text >      ASSESSMENT/PLAN: 	Pt is a 61 y/o male with pmhx of HTN and anxiety (on no medications) who presents to the ER for elevated blood pressure in the setting of 5 days of feeling fatigued, fevers/chills and neck pain radiating down his back and R arm.    --Pt is COVID +  --ID eval aprpeciated, no role for antivirals at this time  --CTA chest ruled out aortic dissection  -Neuro consult appreciated, no focal deficits, no need for emergent imaging, started on tizanidine  - BP improving- on Chlorthalidone and Amlodipine 10 mg  - ischemic work-up as outpatient as well as outpatient sleep study  -will arrange for close OP F/U in the office in 2 weeks with Dr Cadena 9/20 at 1:00PM,  [phone]           DATE OF SERVICE: 09-08-22    Patient denies chest pain or shortness of breath.   Review of symptoms otherwise negative.    Review of Systems:   Constitutional: [ ] fevers, [ ] chills.   Skin: [ ] dry skin. [ ] rashes.  Psychiatric: [ ] depression, [ ] anxiety.   Gastrointestinal: [ ] BRBPR, [ ] melena.   Neurological: [ ] confusion. [ ] seizures. [ ] shuffling gait.   Ears,Nose,Mouth and Throat: [ ] ear pain [ ] sore throat.   Eyes: [ ] diplopia.   Respiratory: [ ] hemoptysis. [ ] shortness of breath  Cardiovascular: See HPI above  Hematologic/Lymphatic: [ ] anemia. [ ] painful nodes. [ ] prolonged bleeding.   Genitourinary: [ ] hematuria. [ ] flank pain.   Endocrine: [ ] significant change in weight. [ ] intolerance to heat and cold.     Review of systems [x ] otherwise negative, [ ] otherwise unable to obtain    FH: no family history of sudden cardiac death in first degree relatives    SH: [ ] tobacco, [ ] alcohol, [ ] drugs    acetaminophen     Tablet .. 650 milliGRAM(s) Oral every 6 hours PRN  ALBUTerol    90 MICROgram(s) HFA Inhaler 2 Puff(s) Inhalation every 4 hours PRN  amLODIPine   Tablet 10 milliGRAM(s) Oral daily  chlorthalidone 25 milliGRAM(s) Oral daily  heparin   Injectable 5000 Unit(s) SubCutaneous every 8 hours  LORazepam     Tablet 0.5 milliGRAM(s) Oral three times a day PRN  nortriptyline 10 milliGRAM(s) Oral daily  tiZANidine 2 milliGRAM(s) Oral every 8 hours                            15.8   6.20  )-----------( 240      ( 08 Sep 2022 07:04 )             48.8       09-08    140  |  104  |  22  ----------------------------<  106<H>  4.2   |  22  |  1.41<H>    Ca    9.5      08 Sep 2022 07:04  Phos  3.1     09-08  Mg     2.30     09-08      CARDIAC MARKERS ( 06 Sep 2022 08:34 )  x     / x     / 119 U/L / x     / x          T(C): 36.7 (09-08-22 @ 09:25), Max: 37.1 (09-07-22 @ 17:30)  HR: 88 (09-08-22 @ 09:25) (82 - 96)  BP: 151/94 (09-08-22 @ 09:25) (151/94 - 162/92)  RR: 18 (09-08-22 @ 09:25) (17 - 19)  SpO2: 100% (09-08-22 @ 09:25) (98% - 100%)  Wt(kg): --    I&O's Summary    07 Sep 2022 07:01  -  08 Sep 2022 07:00  --------------------------------------------------------  IN: 1700 mL / OUT: 600 mL / NET: 1100 mL    General: Well nourished in no acute distress. Alert and Oriented * 3.   Head: Normocephalic and atraumatic.   Neck: No JVD. No bruits. Supple. Does not appear to be enlarged.   Cardiovascular: + S1,S2 ; RRR Soft systolic murmur at the left lower sternal border. No rubs noted.    Lungs: CTA b/l. No rhonchi, rales or wheezes.   Abdomen: + BS, soft. Non tender. Non distended. No rebound. No guarding.   Extremities: No clubbing/cyanosis/edema.   Neurologic: Moves all four extremities. Full range of motion.   Skin: Warm and moist. The patient's skin has normal elasticity and good skin turgor.   Psychiatric: Appropriate mood and affect.  Musculoskeletal: Normal range of motion, normal strength       ELEMETRY: SR	      ECG:  	NSR  LVH, PVC    < from: CT Angio Chest Aorta w/wo IV Cont (09.04.22 @ 20:02) >    IMPRESSION:    No aortic dissection.  Hepatomegaly. Hepatic steatosis.    < end of copied text >      < from: Transthoracic Echocardiogram (09.05.22 @ 09:25) >  CONCLUSIONS:  1. Mitral annular calcification, otherwise normal mitral  valve. Minimal mitral regurgitation.  2. Mild concentric left ventricular hypertrophy.  3. Normal left ventricular systolic function. No segmental  wall motion abnormalities.  4. Mild diastolic dysfunction (Stage I).  5. Normal right ventricular size and function.  ------------------------------------------------------------------------  Confirmed on  9/5/2022 - 10:26:00 by Dat Vasquez M.D.,  [facility], Critical access hospital    < end of copied text >      ASSESSMENT/PLAN: 	Pt is a 61 y/o male with pmhx of HTN and anxiety (on no medications) who presents to the ER for elevated blood pressure in the setting of 5 days of feeling fatigued, fevers/chills and neck pain radiating down his back and R arm.    --Pt is COVID +  --ID eval aprpeciated, no role for antivirals at this time  --CTA chest ruled out aortic dissection  -Neuro consult appreciated, no focal deficits, no need for emergent imaging, started on tizanidine  - BP improving- on Chlorthalidone and Amlodipine 10 mg  - ischemic work-up as outpatient as well as outpatient sleep study  -will arrange for close OP F/U in the office in 2 weeks with Dr Cadena 9/20 at 1:00PM,  [phone]           DATE OF SERVICE: 09-08-22    Patient denies chest pain or shortness of breath.   Review of symptoms otherwise negative.    Review of Systems:   Constitutional: [ ] fevers, [ ] chills.   Skin: [ ] dry skin. [ ] rashes.  Psychiatric: [ ] depression, [ ] anxiety.   Gastrointestinal: [ ] BRBPR, [ ] melena.   Neurological: [ ] confusion. [ ] seizures. [ ] shuffling gait.   Ears,Nose,Mouth and Throat: [ ] ear pain [ ] sore throat.   Eyes: [ ] diplopia.   Respiratory: [ ] hemoptysis. [ ] shortness of breath  Cardiovascular: See HPI above  Hematologic/Lymphatic: [ ] anemia. [ ] painful nodes. [ ] prolonged bleeding.   Genitourinary: [ ] hematuria. [ ] flank pain.   Endocrine: [ ] significant change in weight. [ ] intolerance to heat and cold.     Review of systems [x ] otherwise negative, [ ] otherwise unable to obtain    FH: no family history of sudden cardiac death in first degree relatives    SH: [ ] tobacco, [ ] alcohol, [ ] drugs    acetaminophen     Tablet .. 650 milliGRAM(s) Oral every 6 hours PRN  ALBUTerol    90 MICROgram(s) HFA Inhaler 2 Puff(s) Inhalation every 4 hours PRN  amLODIPine   Tablet 10 milliGRAM(s) Oral daily  chlorthalidone 25 milliGRAM(s) Oral daily  heparin   Injectable 5000 Unit(s) SubCutaneous every 8 hours  LORazepam     Tablet 0.5 milliGRAM(s) Oral three times a day PRN  nortriptyline 10 milliGRAM(s) Oral daily  tiZANidine 2 milliGRAM(s) Oral every 8 hours                            15.8   6.20  )-----------( 240      ( 08 Sep 2022 07:04 )             48.8       09-08    140  |  104  |  22  ----------------------------<  106<H>  4.2   |  22  |  1.41<H>    Ca    9.5      08 Sep 2022 07:04  Phos  3.1     09-08  Mg     2.30     09-08      CARDIAC MARKERS ( 06 Sep 2022 08:34 )  x     / x     / 119 U/L / x     / x          T(C): 36.7 (09-08-22 @ 09:25), Max: 37.1 (09-07-22 @ 17:30)  HR: 88 (09-08-22 @ 09:25) (82 - 96)  BP: 151/94 (09-08-22 @ 09:25) (151/94 - 162/92)  RR: 18 (09-08-22 @ 09:25) (17 - 19)  SpO2: 100% (09-08-22 @ 09:25) (98% - 100%)  Wt(kg): --    I&O's Summary    07 Sep 2022 07:01  -  08 Sep 2022 07:00  --------------------------------------------------------  IN: 1700 mL / OUT: 600 mL / NET: 1100 mL    General: Well nourished in no acute distress. Alert and Oriented * 3.   Head: Normocephalic and atraumatic.   Neck: No JVD. No bruits. Supple. Does not appear to be enlarged.   Cardiovascular: + S1,S2 ; RRR Soft systolic murmur at the left lower sternal border. No rubs noted.    Lungs: CTA b/l. No rhonchi, rales or wheezes.   Abdomen: + BS, soft. Non tender. Non distended. No rebound. No guarding.   Extremities: No clubbing/cyanosis/edema.   Neurologic: Moves all four extremities. Full range of motion.   Skin: Warm and moist. The patient's skin has normal elasticity and good skin turgor.   Psychiatric: Appropriate mood and affect.  Musculoskeletal: Normal range of motion, normal strength       ELEMETRY: SR	      ECG:  	NSR  LVH, PVC    < from: CT Angio Chest Aorta w/wo IV Cont (09.04.22 @ 20:02) >    IMPRESSION:    No aortic dissection.  Hepatomegaly. Hepatic steatosis.    < end of copied text >      < from: Transthoracic Echocardiogram (09.05.22 @ 09:25) >  CONCLUSIONS:  1. Mitral annular calcification, otherwise normal mitral  valve. Minimal mitral regurgitation.  2. Mild concentric left ventricular hypertrophy.  3. Normal left ventricular systolic function. No segmental  wall motion abnormalities.  4. Mild diastolic dysfunction (Stage I).  5. Normal right ventricular size and function.  ------------------------------------------------------------------------  Confirmed on  9/5/2022 - 10:26:00 by Dat Vasquez M.D.,  [facility], [facility]    < end of copied text >      ASSESSMENT/PLAN: 	Pt is a 61 y/o male with pmhx of HTN and anxiety (on no medications) who presents to the ER for elevated blood pressure in the setting of 5 days of feeling fatigued, fevers/chills and neck pain radiating down his back and R arm.    --Pt is COVID +  --ID eval aprpeciated, no role for antivirals at this time  --CTA chest ruled out aortic dissection  -Neuro consult appreciated, no focal deficits, no need for emergent imaging, started on tizanidine  - BP improving- on Chlorthalidone and Amlodipine 10 mg  - ischemic work-up as outpatient as well as outpatient sleep study  -will arrange for close OP F/U in the office in 2 weeks with Dr Cadena 9/20 at 1:00PM,  [phone]           DATE OF SERVICE: 09-08-22    Patient denies chest pain or shortness of breath.   Review of symptoms otherwise negative.    Review of Systems:   Constitutional: [ ] fevers, [ ] chills.   Skin: [ ] dry skin. [ ] rashes.  Psychiatric: [ ] depression, [ ] anxiety.   Gastrointestinal: [ ] BRBPR, [ ] melena.   Neurological: [ ] confusion. [ ] seizures. [ ] shuffling gait.   Ears,Nose,Mouth and Throat: [ ] ear pain [ ] sore throat.   Eyes: [ ] diplopia.   Respiratory: [ ] hemoptysis. [ ] shortness of breath  Cardiovascular: See HPI above  Hematologic/Lymphatic: [ ] anemia. [ ] painful nodes. [ ] prolonged bleeding.   Genitourinary: [ ] hematuria. [ ] flank pain.   Endocrine: [ ] significant change in weight. [ ] intolerance to heat and cold.     Review of systems [x ] otherwise negative, [ ] otherwise unable to obtain    FH: no family history of sudden cardiac death in first degree relatives    SH: [ ] tobacco, [ ] alcohol, [ ] drugs    acetaminophen     Tablet .. 650 milliGRAM(s) Oral every 6 hours PRN  ALBUTerol    90 MICROgram(s) HFA Inhaler 2 Puff(s) Inhalation every 4 hours PRN  amLODIPine   Tablet 10 milliGRAM(s) Oral daily  chlorthalidone 25 milliGRAM(s) Oral daily  heparin   Injectable 5000 Unit(s) SubCutaneous every 8 hours  LORazepam     Tablet 0.5 milliGRAM(s) Oral three times a day PRN  nortriptyline 10 milliGRAM(s) Oral daily  tiZANidine 2 milliGRAM(s) Oral every 8 hours                            15.8   6.20  )-----------( 240      ( 08 Sep 2022 07:04 )             48.8       09-08    140  |  104  |  22  ----------------------------<  106<H>  4.2   |  22  |  1.41<H>    Ca    9.5      08 Sep 2022 07:04  Phos  3.1     09-08  Mg     2.30     09-08      CARDIAC MARKERS ( 06 Sep 2022 08:34 )  x     / x     / 119 U/L / x     / x          T(C): 36.7 (09-08-22 @ 09:25), Max: 37.1 (09-07-22 @ 17:30)  HR: 88 (09-08-22 @ 09:25) (82 - 96)  BP: 151/94 (09-08-22 @ 09:25) (151/94 - 162/92)  RR: 18 (09-08-22 @ 09:25) (17 - 19)  SpO2: 100% (09-08-22 @ 09:25) (98% - 100%)  Wt(kg): --    I&O's Summary    07 Sep 2022 07:01  -  08 Sep 2022 07:00  --------------------------------------------------------  IN: 1700 mL / OUT: 600 mL / NET: 1100 mL    General: Well nourished in no acute distress. Alert and Oriented * 3.   Head: Normocephalic and atraumatic.   Neck: No JVD. No bruits. Supple. Does not appear to be enlarged.   Cardiovascular: + S1,S2 ; RRR Soft systolic murmur at the left lower sternal border. No rubs noted.    Lungs: CTA b/l. No rhonchi, rales or wheezes.   Abdomen: + BS, soft. Non tender. Non distended. No rebound. No guarding.   Extremities: No clubbing/cyanosis/edema.   Neurologic: Moves all four extremities. Full range of motion.   Skin: Warm and moist. The patient's skin has normal elasticity and good skin turgor.   Psychiatric: Appropriate mood and affect.  Musculoskeletal: Normal range of motion, normal strength       ELEMETRY: SR	      ECG:  	NSR  LVH, PVC    < from: CT Angio Chest Aorta w/wo IV Cont (09.04.22 @ 20:02) >    IMPRESSION:    No aortic dissection.  Hepatomegaly. Hepatic steatosis.    < end of copied text >      < from: Transthoracic Echocardiogram (09.05.22 @ 09:25) >  CONCLUSIONS:  1. Mitral annular calcification, otherwise normal mitral  valve. Minimal mitral regurgitation.  2. Mild concentric left ventricular hypertrophy.  3. Normal left ventricular systolic function. No segmental  wall motion abnormalities.  4. Mild diastolic dysfunction (Stage I).  5. Normal right ventricular size and function.  ------------------------------------------------------------------------  Confirmed on  9/5/2022 - 10:26:00 by Dat Vasquez M.D.,  [facility]    < end of copied text >      ASSESSMENT/PLAN: 	Pt is a 63 y/o male with pmhx of HTN and anxiety (on no medications) who presents to the ER for elevated blood pressure in the setting of 5 days of feeling fatigued, fevers/chills and neck pain radiating down his back and R arm.    --Pt is COVID +  --ID eval aprpeciated, no role for antivirals at this time  --CTA chest ruled out aortic dissection  -Neuro consult appreciated, no focal deficits, no need for emergent imaging, started on tizanidine  - BP improving- on Chlorthalidone and Amlodipine 10 mg  - ischemic work-up as outpatient as well as outpatient sleep study  -will arrange for close OP F/U in the office in 2 weeks with Dr Cadena 9/20 at 1:00PM,  [phone]

## 2022-09-08 NOTE — PROGRESS NOTE ADULT - SUBJECTIVE AND OBJECTIVE BOX
Patient is a 62y old  Male who presents with a chief complaint of hypertensive urgency (08 Sep 2022 16:14)    Date of servie : 09-08-22 @ 19:01  INTERVAL HPI/OVERNIGHT EVENTS:  T(C): 36.7 (09-08-22 @ 13:25), Max: 37 (09-07-22 @ 21:30)  HR: 90 (09-08-22 @ 13:25) (82 - 90)  BP: 149/98 (09-08-22 @ 13:25) (149/98 - 159/82)  RR: 18 (09-08-22 @ 13:25) (17 - 18)  SpO2: 100% (09-08-22 @ 13:25) (98% - 100%)  Wt(kg): --  I&O's Summary    07 Sep 2022 07:01  -  08 Sep 2022 07:00  --------------------------------------------------------  IN: 1700 mL / OUT: 600 mL / NET: 1100 mL    08 Sep 2022 07:01  -  08 Sep 2022 19:01  --------------------------------------------------------  IN: 200 mL / OUT: 0 mL / NET: 200 mL        LABS:                        15.8   6.20  )-----------( 240      ( 08 Sep 2022 07:04 )             48.8     09-08    140  |  104  |  22  ----------------------------<  106<H>  4.2   |  22  |  1.41<H>    Ca    9.5      08 Sep 2022 07:04  Phos  3.1     09-08  Mg     2.30     09-08          CAPILLARY BLOOD GLUCOSE                MEDICATIONS  (STANDING):  amLODIPine   Tablet 10 milliGRAM(s) Oral daily  chlorthalidone 25 milliGRAM(s) Oral daily  heparin   Injectable 5000 Unit(s) SubCutaneous every 8 hours  nortriptyline 10 milliGRAM(s) Oral daily  tiZANidine 2 milliGRAM(s) Oral every 8 hours    MEDICATIONS  (PRN):  acetaminophen     Tablet .. 650 milliGRAM(s) Oral every 6 hours PRN Temp greater or equal to 38C (100.4F), Mild Pain (1 - 3)  ALBUTerol    90 MICROgram(s) HFA Inhaler 2 Puff(s) Inhalation every 4 hours PRN Shortness of Breath and/or Wheezing  LORazepam     Tablet 0.5 milliGRAM(s) Oral three times a day PRN Anxiety          PHYSICAL EXAM:  GENERAL: NAD, well-groomed, well-developed  HEAD:  Atraumatic, Normocephalic  CHEST/LUNG: Clear to percussion bilaterally; No rales, rhonchi, wheezing, or rubs  HEART: Regular rate and rhythm; No murmurs, rubs, or gallops  ABDOMEN: Soft, Nontender, Nondistended; Bowel sounds present  EXTREMITIES:  2+ Peripheral Pulses, No clubbing, cyanosis, or edema  LYMPH: No lymphadenopathy noted  SKIN: No rashes or lesions    Care Discussed with Consultants/Other Providers [ ] YES  [ ] NO

## 2022-09-08 NOTE — PROGRESS NOTE ADULT - NS ATTEND AMEND GEN_ALL_CORE FT
Patient seen and examined. Agree with plan as detailed in PA/NP Note.       Feels better, creatinine trended up. Encouraged oral intake. IF creatinine trends down consider disharge tomorrow    José Miguel Piper MD  Pager: [phone] Patient seen and examined. Agree with plan as detailed in PA/NP Note.       Feels better, creatinine trended up. Encouraged oral intake. IF creatinine trends down consider disharge tomorrow    José Miguel Piper MD  Pager: [phone] Patient seen and examined. Agree with plan as detailed in PA/NP Note.       Feels better, creatinine trended up. Encouraged oral intake. IF creatinine trends down consider disharge tomorrow    José Miguel Piper MD  Pager: [phone]

## 2022-09-08 NOTE — CHART NOTE - NSCHARTNOTEFT_GEN_A_CORE
neuro rec mri - if pt willing can be performed outpt ->- deciding if he wants an MRI inpt will let us know

## 2022-09-09 ENCOUNTER — TRANSCRIPTION ENCOUNTER (OUTPATIENT)
Age: 62
End: 2022-09-09

## 2022-09-09 VITALS
OXYGEN SATURATION: 100 % | DIASTOLIC BLOOD PRESSURE: 92 MMHG | TEMPERATURE: 98 F | SYSTOLIC BLOOD PRESSURE: 151 MMHG | HEART RATE: 92 BPM | RESPIRATION RATE: 18 BRPM

## 2022-09-09 LAB
ANION GAP SERPL CALC-SCNC: 15 MMOL/L — HIGH (ref 7–14)
BUN SERPL-MCNC: 24 MG/DL — HIGH (ref 7–23)
CALCIUM SERPL-MCNC: 9.3 MG/DL — SIGNIFICANT CHANGE UP (ref 8.4–10.5)
CHLORIDE SERPL-SCNC: 96 MMOL/L — LOW (ref 98–107)
CO2 SERPL-SCNC: 24 MMOL/L — SIGNIFICANT CHANGE UP (ref 22–31)
CREAT SERPL-MCNC: 1.37 MG/DL — HIGH (ref 0.5–1.3)
EGFR: 59 ML/MIN/1.73M2 — LOW
GLUCOSE SERPL-MCNC: 95 MG/DL — SIGNIFICANT CHANGE UP (ref 70–99)
HCT VFR BLD CALC: 46.7 % — SIGNIFICANT CHANGE UP (ref 39–50)
HGB BLD-MCNC: 15.2 G/DL — SIGNIFICANT CHANGE UP (ref 13–17)
MAGNESIUM SERPL-MCNC: 2.2 MG/DL — SIGNIFICANT CHANGE UP (ref 1.6–2.6)
MCHC RBC-ENTMCNC: 27.8 PG — SIGNIFICANT CHANGE UP (ref 27–34)
MCHC RBC-ENTMCNC: 32.5 GM/DL — SIGNIFICANT CHANGE UP (ref 32–36)
MCV RBC AUTO: 85.4 FL — SIGNIFICANT CHANGE UP (ref 80–100)
NRBC # BLD: 0 /100 WBCS — SIGNIFICANT CHANGE UP (ref 0–0)
NRBC # FLD: 0 K/UL — SIGNIFICANT CHANGE UP (ref 0–0)
PHOSPHATE SERPL-MCNC: 2.9 MG/DL — SIGNIFICANT CHANGE UP (ref 2.5–4.5)
PLATELET # BLD AUTO: 211 K/UL — SIGNIFICANT CHANGE UP (ref 150–400)
POTASSIUM SERPL-MCNC: 3.7 MMOL/L — SIGNIFICANT CHANGE UP (ref 3.5–5.3)
POTASSIUM SERPL-SCNC: 3.7 MMOL/L — SIGNIFICANT CHANGE UP (ref 3.5–5.3)
RBC # BLD: 5.47 M/UL — SIGNIFICANT CHANGE UP (ref 4.2–5.8)
RBC # FLD: 14 % — SIGNIFICANT CHANGE UP (ref 10.3–14.5)
SODIUM SERPL-SCNC: 135 MMOL/L — SIGNIFICANT CHANGE UP (ref 135–145)
WBC # BLD: 6.17 K/UL — SIGNIFICANT CHANGE UP (ref 3.8–10.5)
WBC # FLD AUTO: 6.17 K/UL — SIGNIFICANT CHANGE UP (ref 3.8–10.5)

## 2022-09-09 RX ORDER — NORTRIPTYLINE HYDROCHLORIDE 10 MG/1
1 CAPSULE ORAL
Qty: 30 | Refills: 0 | DISCHARGE
Start: 2022-09-09 | End: 2022-10-08

## 2022-09-09 RX ORDER — CHLORTHALIDONE 50 MG
1 TABLET ORAL
Qty: 30 | Refills: 0
Start: 2022-09-09 | End: 2022-10-08

## 2022-09-09 RX ORDER — CHLORTHALIDONE 50 MG
1 TABLET ORAL
Qty: 0 | Refills: 0 | DISCHARGE
Start: 2022-09-09

## 2022-09-09 RX ORDER — TIZANIDINE 4 MG/1
1 TABLET ORAL
Qty: 0 | Refills: 0 | DISCHARGE
Start: 2022-09-09

## 2022-09-09 RX ORDER — NORTRIPTYLINE HYDROCHLORIDE 10 MG/1
1 CAPSULE ORAL
Qty: 0 | Refills: 0 | DISCHARGE
Start: 2022-09-09

## 2022-09-09 RX ORDER — TIZANIDINE 4 MG/1
1 TABLET ORAL
Qty: 90 | Refills: 0
Start: 2022-09-09 | End: 2022-10-08

## 2022-09-09 RX ORDER — AMLODIPINE BESYLATE 2.5 MG/1
1 TABLET ORAL
Qty: 0 | Refills: 0 | DISCHARGE
Start: 2022-09-09

## 2022-09-09 RX ORDER — AMLODIPINE BESYLATE 2.5 MG/1
1 TABLET ORAL
Qty: 30 | Refills: 0
Start: 2022-09-09 | End: 2022-10-08

## 2022-09-09 RX ORDER — NORTRIPTYLINE HYDROCHLORIDE 10 MG/1
1 CAPSULE ORAL
Qty: 30 | Refills: 0
Start: 2022-09-09 | End: 2022-10-08

## 2022-09-09 RX ADMIN — AMLODIPINE BESYLATE 10 MILLIGRAM(S): 2.5 TABLET ORAL at 05:09

## 2022-09-09 RX ADMIN — TIZANIDINE 2 MILLIGRAM(S): 4 TABLET ORAL at 00:56

## 2022-09-09 RX ADMIN — HEPARIN SODIUM 5000 UNIT(S): 5000 INJECTION INTRAVENOUS; SUBCUTANEOUS at 13:07

## 2022-09-09 RX ADMIN — HEPARIN SODIUM 5000 UNIT(S): 5000 INJECTION INTRAVENOUS; SUBCUTANEOUS at 05:19

## 2022-09-09 RX ADMIN — NORTRIPTYLINE HYDROCHLORIDE 10 MILLIGRAM(S): 10 CAPSULE ORAL at 13:07

## 2022-09-09 RX ADMIN — TIZANIDINE 2 MILLIGRAM(S): 4 TABLET ORAL at 08:57

## 2022-09-09 RX ADMIN — TIZANIDINE 2 MILLIGRAM(S): 4 TABLET ORAL at 17:08

## 2022-09-09 RX ADMIN — Medication 25 MILLIGRAM(S): at 05:13

## 2022-09-09 NOTE — PROGRESS NOTE ADULT - REASON FOR ADMISSION
hypertensive urgency

## 2022-09-09 NOTE — PROGRESS NOTE ADULT - SUBJECTIVE AND OBJECTIVE BOX
EP Attending    HISTORY OF PRESENT ILLNESS: HPI:  Pt is a 63 y/o male with pmhx of HTN and anxiety (on no medications) who presents to the ER for elevated blood pressure in the setting of 5 days of feeling fatigue and neck pain radiating down his back and R arm. HE reports that it might have started after he lifted a heavy trashbag. No weak or tingling sensation and no worsening on exertion. He denies any chest pain nor shortness of breath and denies any cardiac history. He does endorse mild cough and post nasal drip, otherwise no headache nor fever. No abd pain, diarrhea or dysuria. He has been having a good appetite. He does endorse feeling more anxious and slightly depressed recently due to being alone but denies any suicidal ideation. He reports he was on meds in the past but not recently. In the Er pt was found hypertensive and given amlodipine and hydralazine. Also given a dose of ativan for anxiety.  (04 Sep 2022 22:29)    9/5-  patient has chronic anxiety and tends to avoid medical care. has palpitations on a near-daily basis, but no fainting.  has nonspecific chest discomfort radiating into his arms and neck, but is not sure if this is angina, since it can happen at rest.  no prior known MI or stroke.  no prior known vascular disease.  he presented in hypertensive urgency vs emergency, /110s with chest/neck discomfort.  No dissection on CTA.  sinus rhythm on telemetry. He is essentially a new start to oral antihypertensives after lapsed care for a few years.    A 10 pt ROS is otherwise negative.  9/6- feeling well, able to lie flat, no significant shortness of breath.  no angina or palpitations today.  9/7- resting comfortably. no new complaints.  9/8- no new complaints.  resting comfortably, ready to go home.  Date of service 9/9- resting comfortably, no complaints.    acetaminophen     Tablet .. 650 milliGRAM(s) Oral every 6 hours PRN  ALBUTerol    90 MICROgram(s) HFA Inhaler 2 Puff(s) Inhalation every 4 hours PRN  amLODIPine   Tablet 10 milliGRAM(s) Oral daily  chlorthalidone 25 milliGRAM(s) Oral daily  heparin   Injectable 5000 Unit(s) SubCutaneous every 8 hours  LORazepam     Tablet 0.5 milliGRAM(s) Oral three times a day PRN  nortriptyline 10 milliGRAM(s) Oral daily  tiZANidine 2 milliGRAM(s) Oral every 8 hours                        15.2   6.17  )-----------( 211      ( 09 Sep 2022 06:12 )             46.7       09-09    135  |  96<L>  |  24<H>  ----------------------------<  95  3.7   |  24  |  1.37<H>    Ca    9.3      09 Sep 2022 06:12  Phos  2.9     09-09  Mg     2.20     09-09      T(C): 36.8 (09-09-22 @ 09:15), Max: 36.8 (09-09-22 @ 09:15)  HR: 92 (09-09-22 @ 09:15) (82 - 92)  BP: 151/92 (09-09-22 @ 09:15) (149/98 - 156/93)  RR: 18 (09-09-22 @ 09:15) (16 - 18)  SpO2: 100% (09-09-22 @ 09:15) (100% - 100%)  Wt(kg): --    I&O's Summary    08 Sep 2022 07:01  -  09 Sep 2022 07:00  --------------------------------------------------------  IN: 900 mL / OUT: 0 mL / NET: 900 mL      General: Well nourished, no acute distress, alert and oriented x 3  Head: normocephalic, no trauma  Neck: no JVD, no bruit, supple, not enlarged  CV: S1S2, no S3, regular rate, rhythm is SINUS, no murmurs.    Lungs: clear BL, no rales or wheezes  Abdomen: bowel sounds +, soft, nontender, nondistended  Extremities: no clubbing, cyanosis or edema  Neuro: Moves all 4 extremities, sensation intact x 4 extremities  Skin: warm and moist, normal turgor  Psych: Mood and affect are appropriate for circumstances  MSK: normal range of motion and strength x4 extremities.    TELEMETRY: NSR, no AFib.    ECG:  	NSR , anterior and lateral T wave inversions, PAc's.    < from: CT Angio Chest Aorta w/wo IV Cont (09.04.22 @ 20:02) >    IMPRESSION:    No aortic dissection.  Hepatomegaly. Hepatic steatosis.    < end of copied text >      ASSESSMENT/PLAN: 	Pt is a 63 y/o male here with shortness of breath, fatigue, atypical chest pain.  Found to have COVID.  Lapsed care for hypertension.  Palpitations.  Anxiety disorder?    --Expected pulmonary and systemic symptoms from COVID  --Echo reassuring with normal LVEF.  --Antihypertensives per general cardiology.   --Telemetry monitoring for AFib or SVT.  Would defer invasive management of any arrhythmia until after cleared from COVID isolation.  --Maintain K 4-4.5, Mg 2 via oral route.  --If no arrhythmia identified in hospital, will recommend ambulatory event monitoring.    Jean Jo M.D.  Cardiac Electrophysiology  [phone]     EP Attending    HISTORY OF PRESENT ILLNESS: HPI:  Pt is a 63 y/o male with pmhx of HTN and anxiety (on no medications) who presents to the ER for elevated blood pressure in the setting of 5 days of feeling fatigue and neck pain radiating down his back and R arm. HE reports that it might have started after he lifted a heavy trashbag. No weak or tingling sensation and no worsening on exertion. He denies any chest pain nor shortness of breath and denies any cardiac history. He does endorse mild cough and post nasal drip, otherwise no headache nor fever. No abd pain, diarrhea or dysuria. He has been having a good appetite. He does endorse feeling more anxious and slightly depressed recently due to being alone but denies any suicidal ideation. He reports he was on meds in the past but not recently. In the Er pt was found hypertensive and given amlodipine and hydralazine. Also given a dose of ativan for anxiety.  (04 Sep 2022 22:29)    9/5-  patient has chronic anxiety and tends to avoid medical care. has palpitations on a near-daily basis, but no fainting.  has nonspecific chest discomfort radiating into his arms and neck, but is not sure if this is angina, since it can happen at rest.  no prior known MI or stroke.  no prior known vascular disease.  he presented in hypertensive urgency vs emergency, /110s with chest/neck discomfort.  No dissection on CTA.  sinus rhythm on telemetry. He is essentially a new start to oral antihypertensives after lapsed care for a few years.    A 10 pt ROS is otherwise negative.  9/6- feeling well, able to lie flat, no significant shortness of breath.  no angina or palpitations today.  9/7- resting comfortably. no new complaints.  9/8- no new complaints.  resting comfortably, ready to go home.  Date of service 9/9- resting comfortably, no complaints.    acetaminophen     Tablet .. 650 milliGRAM(s) Oral every 6 hours PRN  ALBUTerol    90 MICROgram(s) HFA Inhaler 2 Puff(s) Inhalation every 4 hours PRN  amLODIPine   Tablet 10 milliGRAM(s) Oral daily  chlorthalidone 25 milliGRAM(s) Oral daily  heparin   Injectable 5000 Unit(s) SubCutaneous every 8 hours  LORazepam     Tablet 0.5 milliGRAM(s) Oral three times a day PRN  nortriptyline 10 milliGRAM(s) Oral daily  tiZANidine 2 milliGRAM(s) Oral every 8 hours                        15.2   6.17  )-----------( 211      ( 09 Sep 2022 06:12 )             46.7       09-09    135  |  96<L>  |  24<H>  ----------------------------<  95  3.7   |  24  |  1.37<H>    Ca    9.3      09 Sep 2022 06:12  Phos  2.9     09-09  Mg     2.20     09-09      T(C): 36.8 (09-09-22 @ 09:15), Max: 36.8 (09-09-22 @ 09:15)  HR: 92 (09-09-22 @ 09:15) (82 - 92)  BP: 151/92 (09-09-22 @ 09:15) (149/98 - 156/93)  RR: 18 (09-09-22 @ 09:15) (16 - 18)  SpO2: 100% (09-09-22 @ 09:15) (100% - 100%)  Wt(kg): --    I&O's Summary    08 Sep 2022 07:01  -  09 Sep 2022 07:00  --------------------------------------------------------  IN: 900 mL / OUT: 0 mL / NET: 900 mL      General: Well nourished, no acute distress, alert and oriented x 3  Head: normocephalic, no trauma  Neck: no JVD, no bruit, supple, not enlarged  CV: S1S2, no S3, regular rate, rhythm is SINUS, no murmurs.    Lungs: clear BL, no rales or wheezes  Abdomen: bowel sounds +, soft, nontender, nondistended  Extremities: no clubbing, cyanosis or edema  Neuro: Moves all 4 extremities, sensation intact x 4 extremities  Skin: warm and moist, normal turgor  Psych: Mood and affect are appropriate for circumstances  MSK: normal range of motion and strength x4 extremities.    TELEMETRY: NSR, no AFib.    ECG:  	NSR , anterior and lateral T wave inversions, PAc's.    < from: CT Angio Chest Aorta w/wo IV Cont (09.04.22 @ 20:02) >    IMPRESSION:    No aortic dissection.  Hepatomegaly. Hepatic steatosis.    < end of copied text >      ASSESSMENT/PLAN: 	Pt is a 63 y/o male here with shortness of breath, fatigue, atypical chest pain.  Found to have COVID.  Lapsed care for hypertension.  Palpitations.  Anxiety disorder?    --Expected pulmonary and systemic symptoms from COVID  --Echo reassuring with normal LVEF.  --Antihypertensives per general cardiology.   --Telemetry monitoring for AFib or SVT.  Would defer invasive management of any arrhythmia until after cleared from COVID isolation.  --Maintain K 4-4.5, Mg 2 via oral route.  --If no arrhythmia identified in hospital, will recommend ambulatory event monitoring.    Jean Jo M.D.  Cardiac Electrophysiology  [phone]     EP Attending    HISTORY OF PRESENT ILLNESS: HPI:  Pt is a 61 y/o male with pmhx of HTN and anxiety (on no medications) who presents to the ER for elevated blood pressure in the setting of 5 days of feeling fatigue and neck pain radiating down his back and R arm. HE reports that it might have started after he lifted a heavy trashbag. No weak or tingling sensation and no worsening on exertion. He denies any chest pain nor shortness of breath and denies any cardiac history. He does endorse mild cough and post nasal drip, otherwise no headache nor fever. No abd pain, diarrhea or dysuria. He has been having a good appetite. He does endorse feeling more anxious and slightly depressed recently due to being alone but denies any suicidal ideation. He reports he was on meds in the past but not recently. In the Er pt was found hypertensive and given amlodipine and hydralazine. Also given a dose of ativan for anxiety.  (04 Sep 2022 22:29)    9/5-  patient has chronic anxiety and tends to avoid medical care. has palpitations on a near-daily basis, but no fainting.  has nonspecific chest discomfort radiating into his arms and neck, but is not sure if this is angina, since it can happen at rest.  no prior known MI or stroke.  no prior known vascular disease.  he presented in hypertensive urgency vs emergency, /110s with chest/neck discomfort.  No dissection on CTA.  sinus rhythm on telemetry. He is essentially a new start to oral antihypertensives after lapsed care for a few years.    A 10 pt ROS is otherwise negative.  9/6- feeling well, able to lie flat, no significant shortness of breath.  no angina or palpitations today.  9/7- resting comfortably. no new complaints.  9/8- no new complaints.  resting comfortably, ready to go home.  Date of service 9/9- resting comfortably, no complaints.    acetaminophen     Tablet .. 650 milliGRAM(s) Oral every 6 hours PRN  ALBUTerol    90 MICROgram(s) HFA Inhaler 2 Puff(s) Inhalation every 4 hours PRN  amLODIPine   Tablet 10 milliGRAM(s) Oral daily  chlorthalidone 25 milliGRAM(s) Oral daily  heparin   Injectable 5000 Unit(s) SubCutaneous every 8 hours  LORazepam     Tablet 0.5 milliGRAM(s) Oral three times a day PRN  nortriptyline 10 milliGRAM(s) Oral daily  tiZANidine 2 milliGRAM(s) Oral every 8 hours                        15.2   6.17  )-----------( 211      ( 09 Sep 2022 06:12 )             46.7       09-09    135  |  96<L>  |  24<H>  ----------------------------<  95  3.7   |  24  |  1.37<H>    Ca    9.3      09 Sep 2022 06:12  Phos  2.9     09-09  Mg     2.20     09-09      T(C): 36.8 (09-09-22 @ 09:15), Max: 36.8 (09-09-22 @ 09:15)  HR: 92 (09-09-22 @ 09:15) (82 - 92)  BP: 151/92 (09-09-22 @ 09:15) (149/98 - 156/93)  RR: 18 (09-09-22 @ 09:15) (16 - 18)  SpO2: 100% (09-09-22 @ 09:15) (100% - 100%)  Wt(kg): --    I&O's Summary    08 Sep 2022 07:01  -  09 Sep 2022 07:00  --------------------------------------------------------  IN: 900 mL / OUT: 0 mL / NET: 900 mL      General: Well nourished, no acute distress, alert and oriented x 3  Head: normocephalic, no trauma  Neck: no JVD, no bruit, supple, not enlarged  CV: S1S2, no S3, regular rate, rhythm is SINUS, no murmurs.    Lungs: clear BL, no rales or wheezes  Abdomen: bowel sounds +, soft, nontender, nondistended  Extremities: no clubbing, cyanosis or edema  Neuro: Moves all 4 extremities, sensation intact x 4 extremities  Skin: warm and moist, normal turgor  Psych: Mood and affect are appropriate for circumstances  MSK: normal range of motion and strength x4 extremities.    TELEMETRY: NSR, no AFib.    ECG:  	NSR , anterior and lateral T wave inversions, PAc's.    < from: CT Angio Chest Aorta w/wo IV Cont (09.04.22 @ 20:02) >    IMPRESSION:    No aortic dissection.  Hepatomegaly. Hepatic steatosis.    < end of copied text >      ASSESSMENT/PLAN: 	Pt is a 61 y/o male here with shortness of breath, fatigue, atypical chest pain.  Found to have COVID.  Lapsed care for hypertension.  Palpitations.  Anxiety disorder?    --Expected pulmonary and systemic symptoms from COVID  --Echo reassuring with normal LVEF.  --Antihypertensives per general cardiology.   --Telemetry monitoring for AFib or SVT.  Would defer invasive management of any arrhythmia until after cleared from COVID isolation.  --Maintain K 4-4.5, Mg 2 via oral route.  --If no arrhythmia identified in hospital, will recommend ambulatory event monitoring.    Jean Jo M.D.  Cardiac Electrophysiology  [phone]

## 2022-09-09 NOTE — PROGRESS NOTE ADULT - PROVIDER SPECIALTY LIST ADULT
Cardiology
Electrophysiology
Neurology
Nephrology
Cardiology
Electrophysiology
Cardiology
Cardiology
Electrophysiology
Electrophysiology
Neurology
Hospitalist

## 2022-09-09 NOTE — PROGRESS NOTE ADULT - SUBJECTIVE AND OBJECTIVE BOX
Current Discharge Medication List  
  
ASK your doctor about these medications Dose & Instructions Dispensing Information Comments Morning Noon Evening Bedtime  
 ibuprofen 600 mg tablet Commonly known as:  MOTRIN Your last dose was: Your next dose is:    
   
   
 Dose:  600 mg Take 1 Tab by mouth every eight (8) hours as needed for Pain. Quantity:  21 Tab Refills:  0  
     
   
   
   
  
 methocarbamol 750 mg tablet Commonly known as:  [id number] Your last dose was: Your next dose is:    
   
   
 Dose:  750 mg Take 1 Tab by mouth three (3) times daily. Quantity:  15 Tab Refills:  0  
     
   
   
   
  
 PNV Combo No.47-Iron-FA #1-DHA 27-1-300 mg Cap Your last dose was: Your next dose is: Take  by mouth. Refills:  0 DATE OF SERVICE: 09-09-22    Patient denies chest pain or shortness of breath.   Review of symptoms otherwise negative.    MEDICATIONS:  acetaminophen     Tablet .. 650 milliGRAM(s) Oral every 6 hours PRN  ALBUTerol    90 MICROgram(s) HFA Inhaler 2 Puff(s) Inhalation every 4 hours PRN  amLODIPine   Tablet 10 milliGRAM(s) Oral daily  chlorthalidone 25 milliGRAM(s) Oral daily  heparin   Injectable 5000 Unit(s) SubCutaneous every 8 hours  LORazepam     Tablet 0.5 milliGRAM(s) Oral three times a day PRN  nortriptyline 10 milliGRAM(s) Oral daily  tiZANidine 2 milliGRAM(s) Oral every 8 hours      LABS:                        15.2   6.17  )-----------( 211      ( 09 Sep 2022 06:12 )             46.7       Hemoglobin: 15.2 g/dL (09-09 @ 06:12)  Hemoglobin: 15.8 g/dL (09-08 @ 07:04)  Hemoglobin: 15.6 g/dL (09-07 @ 07:10)  Hemoglobin: 15.3 g/dL (09-06 @ 08:34)  Hemoglobin: 13.7 g/dL (09-04 @ 17:31)      09-09    135  |  96<L>  |  24<H>  ----------------------------<  95  3.7   |  24  |  1.37<H>    Ca    9.3      09 Sep 2022 06:12  Phos  2.9     09-09  Mg     2.20     09-09      Creatinine Trend: 1.37<--, 1.41<--, 1.28<--, 1.15<--, 1.43<--    COAGS:           PHYSICAL EXAM:  T(C): 36.8 (09-09-22 @ 09:15), Max: 36.8 (09-09-22 @ 09:15)  HR: 92 (09-09-22 @ 09:15) (82 - 92)  BP: 151/92 (09-09-22 @ 09:15) (151/92 - 156/93)  RR: 18 (09-09-22 @ 09:15) (16 - 18)  SpO2: 100% (09-09-22 @ 09:15) (100% - 100%)  Wt(kg): --    I&O's Summary    08 Sep 2022 07:01  -  09 Sep 2022 07:00  --------------------------------------------------------  IN: 900 mL / OUT: 0 mL / NET: 900 mL        General: Well nourished in no acute distress. Alert and Oriented * 3.   Head: Normocephalic and atraumatic.   Neck: No JVD. No bruits. Supple. Does not appear to be enlarged.   Cardiovascular: + S1,S2 ; RRR Soft systolic murmur at the left lower sternal border. No rubs noted.    Lungs: CTA b/l. No rhonchi, rales or wheezes.   Abdomen: + BS, soft. Non tender. Non distended. No rebound. No guarding.   Extremities: No clubbing/cyanosis/edema.   Neurologic: Moves all four extremities. Full range of motion.   Skin: Warm and moist. The patient's skin has normal elasticity and good skin turgor.   Psychiatric: Appropriate mood and affect.  Musculoskeletal: Normal range of motion, normal strength       ELEMETRY: SR	      ECG:  	NSR  LVH, PVC    < from: CT Angio Chest Aorta w/wo IV Cont (09.04.22 @ 20:02) >    IMPRESSION:    No aortic dissection.  Hepatomegaly. Hepatic steatosis.    < end of copied text >      < from: Transthoracic Echocardiogram (09.05.22 @ 09:25) >  CONCLUSIONS:  1. Mitral annular calcification, otherwise normal mitral  valve. Minimal mitral regurgitation.  2. Mild concentric left ventricular hypertrophy.  3. Normal left ventricular systolic function. No segmental  wall motion abnormalities.  4. Mild diastolic dysfunction (Stage I).  5. Normal right ventricular size and function.  ------------------------------------------------------------------------  Confirmed on  9/5/2022 - 10:26:00 by Dat Vasquez M.D.,  [facility], [place]    < end of copied text >      ASSESSMENT/PLAN: 	Pt is a 63 y/o male with pmhx of HTN and anxiety (on no medications) who presents to the ER for elevated blood pressure in the setting of 5 days of feeling fatigued, fevers/chills and neck pain radiating down his back and R arm.    --CTA chest ruled out aortic dissection  -Neuro consult appreciated, no focal deficits, no need for emergent imaging, started on tizanidine  - BP improving- on Chlorthalidone and Amlodipine 10 mg. Will discharge on dual therapy, will check BMP for potassium in clinic,  likely will need 3rd  anti-hypertensive will consider starting in clinic  - ischemic work-up as outpatient as well as outpatient sleep study  - will arrange for close OP F/U in the office in 2 weeks with Dr Cadena 9/20 at 1:00PM,  [phone]    José Miguel Piper MD  Pager: [phone]            DATE OF SERVICE: 09-09-22    Patient denies chest pain or shortness of breath.   Review of symptoms otherwise negative.    MEDICATIONS:  acetaminophen     Tablet .. 650 milliGRAM(s) Oral every 6 hours PRN  ALBUTerol    90 MICROgram(s) HFA Inhaler 2 Puff(s) Inhalation every 4 hours PRN  amLODIPine   Tablet 10 milliGRAM(s) Oral daily  chlorthalidone 25 milliGRAM(s) Oral daily  heparin   Injectable 5000 Unit(s) SubCutaneous every 8 hours  LORazepam     Tablet 0.5 milliGRAM(s) Oral three times a day PRN  nortriptyline 10 milliGRAM(s) Oral daily  tiZANidine 2 milliGRAM(s) Oral every 8 hours      LABS:                        15.2   6.17  )-----------( 211      ( 09 Sep 2022 06:12 )             46.7       Hemoglobin: 15.2 g/dL (09-09 @ 06:12)  Hemoglobin: 15.8 g/dL (09-08 @ 07:04)  Hemoglobin: 15.6 g/dL (09-07 @ 07:10)  Hemoglobin: 15.3 g/dL (09-06 @ 08:34)  Hemoglobin: 13.7 g/dL (09-04 @ 17:31)      09-09    135  |  96<L>  |  24<H>  ----------------------------<  95  3.7   |  24  |  1.37<H>    Ca    9.3      09 Sep 2022 06:12  Phos  2.9     09-09  Mg     2.20     09-09      Creatinine Trend: 1.37<--, 1.41<--, 1.28<--, 1.15<--, 1.43<--    COAGS:           PHYSICAL EXAM:  T(C): 36.8 (09-09-22 @ 09:15), Max: 36.8 (09-09-22 @ 09:15)  HR: 92 (09-09-22 @ 09:15) (82 - 92)  BP: 151/92 (09-09-22 @ 09:15) (151/92 - 156/93)  RR: 18 (09-09-22 @ 09:15) (16 - 18)  SpO2: 100% (09-09-22 @ 09:15) (100% - 100%)  Wt(kg): --    I&O's Summary    08 Sep 2022 07:01  -  09 Sep 2022 07:00  --------------------------------------------------------  IN: 900 mL / OUT: 0 mL / NET: 900 mL        General: Well nourished in no acute distress. Alert and Oriented * 3.   Head: Normocephalic and atraumatic.   Neck: No JVD. No bruits. Supple. Does not appear to be enlarged.   Cardiovascular: + S1,S2 ; RRR Soft systolic murmur at the left lower sternal border. No rubs noted.    Lungs: CTA b/l. No rhonchi, rales or wheezes.   Abdomen: + BS, soft. Non tender. Non distended. No rebound. No guarding.   Extremities: No clubbing/cyanosis/edema.   Neurologic: Moves all four extremities. Full range of motion.   Skin: Warm and moist. The patient's skin has normal elasticity and good skin turgor.   Psychiatric: Appropriate mood and affect.  Musculoskeletal: Normal range of motion, normal strength       ELEMETRY: SR	      ECG:  	NSR  LVH, PVC    < from: CT Angio Chest Aorta w/wo IV Cont (09.04.22 @ 20:02) >    IMPRESSION:    No aortic dissection.  Hepatomegaly. Hepatic steatosis.    < end of copied text >      < from: Transthoracic Echocardiogram (09.05.22 @ 09:25) >  CONCLUSIONS:  1. Mitral annular calcification, otherwise normal mitral  valve. Minimal mitral regurgitation.  2. Mild concentric left ventricular hypertrophy.  3. Normal left ventricular systolic function. No segmental  wall motion abnormalities.  4. Mild diastolic dysfunction (Stage I).  5. Normal right ventricular size and function.  ------------------------------------------------------------------------  Confirmed on  9/5/2022 - 10:26:00 by Dat Vasquez M.D.,  [facility], [place]    < end of copied text >      ASSESSMENT/PLAN: 	Pt is a 63 y/o male with pmhx of HTN and anxiety (on no medications) who presents to the ER for elevated blood pressure in the setting of 5 days of feeling fatigued, fevers/chills and neck pain radiating down his back and R arm.    --CTA chest ruled out aortic dissection  -Neuro consult appreciated, no focal deficits, no need for emergent imaging, started on tizanidine  - BP improving- on Chlorthalidone and Amlodipine 10 mg. Will discharge on dual therapy, will check BMP for potassium in clinic,  likely will need 3rd  anti-hypertensive will consider starting in clinic  - ischemic work-up as outpatient as well as outpatient sleep study  - will arrange for close OP F/U in the office in 2 weeks with Dr Cadena 9/20 at 1:00PM,  [phone]    José Miguel Piper MD  Pager: [phone]            DATE OF SERVICE: 09-09-22    Patient denies chest pain or shortness of breath.   Review of symptoms otherwise negative.    MEDICATIONS:  acetaminophen     Tablet .. 650 milliGRAM(s) Oral every 6 hours PRN  ALBUTerol    90 MICROgram(s) HFA Inhaler 2 Puff(s) Inhalation every 4 hours PRN  amLODIPine   Tablet 10 milliGRAM(s) Oral daily  chlorthalidone 25 milliGRAM(s) Oral daily  heparin   Injectable 5000 Unit(s) SubCutaneous every 8 hours  LORazepam     Tablet 0.5 milliGRAM(s) Oral three times a day PRN  nortriptyline 10 milliGRAM(s) Oral daily  tiZANidine 2 milliGRAM(s) Oral every 8 hours      LABS:                        15.2   6.17  )-----------( 211      ( 09 Sep 2022 06:12 )             46.7       Hemoglobin: 15.2 g/dL (09-09 @ 06:12)  Hemoglobin: 15.8 g/dL (09-08 @ 07:04)  Hemoglobin: 15.6 g/dL (09-07 @ 07:10)  Hemoglobin: 15.3 g/dL (09-06 @ 08:34)  Hemoglobin: 13.7 g/dL (09-04 @ 17:31)      09-09    135  |  96<L>  |  24<H>  ----------------------------<  95  3.7   |  24  |  1.37<H>    Ca    9.3      09 Sep 2022 06:12  Phos  2.9     09-09  Mg     2.20     09-09      Creatinine Trend: 1.37<--, 1.41<--, 1.28<--, 1.15<--, 1.43<--    COAGS:           PHYSICAL EXAM:  T(C): 36.8 (09-09-22 @ 09:15), Max: 36.8 (09-09-22 @ 09:15)  HR: 92 (09-09-22 @ 09:15) (82 - 92)  BP: 151/92 (09-09-22 @ 09:15) (151/92 - 156/93)  RR: 18 (09-09-22 @ 09:15) (16 - 18)  SpO2: 100% (09-09-22 @ 09:15) (100% - 100%)  Wt(kg): --    I&O's Summary    08 Sep 2022 07:01  -  09 Sep 2022 07:00  --------------------------------------------------------  IN: 900 mL / OUT: 0 mL / NET: 900 mL        General: Well nourished in no acute distress. Alert and Oriented * 3.   Head: Normocephalic and atraumatic.   Neck: No JVD. No bruits. Supple. Does not appear to be enlarged.   Cardiovascular: + S1,S2 ; RRR Soft systolic murmur at the left lower sternal border. No rubs noted.    Lungs: CTA b/l. No rhonchi, rales or wheezes.   Abdomen: + BS, soft. Non tender. Non distended. No rebound. No guarding.   Extremities: No clubbing/cyanosis/edema.   Neurologic: Moves all four extremities. Full range of motion.   Skin: Warm and moist. The patient's skin has normal elasticity and good skin turgor.   Psychiatric: Appropriate mood and affect.  Musculoskeletal: Normal range of motion, normal strength       ELEMETRY: SR	      ECG:  	NSR  LVH, PVC    < from: CT Angio Chest Aorta w/wo IV Cont (09.04.22 @ 20:02) >    IMPRESSION:    No aortic dissection.  Hepatomegaly. Hepatic steatosis.    < end of copied text >      < from: Transthoracic Echocardiogram (09.05.22 @ 09:25) >  CONCLUSIONS:  1. Mitral annular calcification, otherwise normal mitral  valve. Minimal mitral regurgitation.  2. Mild concentric left ventricular hypertrophy.  3. Normal left ventricular systolic function. No segmental  wall motion abnormalities.  4. Mild diastolic dysfunction (Stage I).  5. Normal right ventricular size and function.  ------------------------------------------------------------------------  Confirmed on  9/5/2022 - 10:26:00 by Dat Vasquez M.D.,  [facility], [place]    < end of copied text >      ASSESSMENT/PLAN: 	Pt is a 63 y/o male with pmhx of HTN and anxiety (on no medications) who presents to the ER for elevated blood pressure in the setting of 5 days of feeling fatigued, fevers/chills and neck pain radiating down his back and R arm.    --CTA chest ruled out aortic dissection  -Neuro consult appreciated, no focal deficits, no need for emergent imaging, started on tizanidine  - BP improving- on Chlorthalidone and Amlodipine 10 mg. Will discharge on dual therapy, will check BMP for potassium in clinic,  likely will need 3rd  anti-hypertensive will consider starting in clinic  - ischemic work-up as outpatient as well as outpatient sleep study  - will arrange for close OP F/U in the office in 2 weeks with Dr Cadena 9/20 at 1:00PM,  [phone]    José Miguel Piper MD  Pager: [phone]

## 2022-09-09 NOTE — DISCHARGE NOTE NURSING/CASE MANAGEMENT/SOCIAL WORK - PATIENT PORTAL LINK FT
You can access the FollowMyHealth Patient Portal offered by [facility] by registering at the following website: [email or web address]. By joining "[employer]’s FollowMyHealth portal, you will also be able to view your health information using other applications (apps) compatible with our system. You can access the FollowMyHealth Patient Portal offered by [facility] by registering at the following website: [email or web address]. By joining [employer]’s FollowMyHealth portal, you will also be able to view your health information using other applications (apps) compatible with our system. You can access the FollowMyHealth Patient Portal offered by [facility] by registering at the following website: [email or web address]. By joining [employer]’s FollowMyHealth portal, you will also be able to view your health information using other applications (apps) compatible with our system.

## 2022-09-09 NOTE — DISCHARGE NOTE NURSING/CASE MANAGEMENT/SOCIAL WORK - NSDCFUADDAPPT_GEN_ALL_CORE_FT
Please follow up with your PCP within 1-2 weeks after D/C, there is the number to the [facility]    Please follow up with Dr Cadena 9/20 at 1:00PM,  [phone]    Please follow up with the nephrologist Dr. Faisal Hernandez MD [facility]  Office: [phone], please call office for appointment regarding your kidney function  Please follow up with your PCP within 1-2 weeks after D/C, there is the number to the [facility]    Please follow up with Dr Cadena 9/20 at 1:00PM,  [phone]    Please follow up with the nephrologist Dr. Faisal Hernandez MD [facility]  Office: [phone], please call office for appointment regarding your kidney function  Please follow up with your PCP within 1-2 weeks after D/C, there is the number to the [facility]    Please follow up with Dr Cadena 9/20 at 1:00PM,  [phone]    Please follow up with the nephrologist Dr. Faisal Hernandez MD [facility]  Office: [phone], please call office for appointment regarding your kidney function

## 2022-09-09 NOTE — PHARMACOTHERAPY INTERVENTION NOTE - COMMENTS
Discharge medications reviewed with the patient. Outpatient medication schedule was discussed in detail including: medication name, indication, dose, administration times, treatment duration, side effects, drug interactions, and special instructions. Patient questions and concerns were answered and addressed. Patient demonstrated understanding. Patient provided with educational handout.

## 2022-09-09 NOTE — DISCHARGE NOTE NURSING/CASE MANAGEMENT/SOCIAL WORK - NSDCPEFALRISK_GEN_ALL_CORE
For information on Fall & Injury Prevention, visit: [email or web address] OR  [email or web address]-injury OR  https://www.cdc.gov/steadi/patient.html For information on Fall & Injury Prevention, visit: [email or web address]-mobility OR  [email or web address]-[email or web address]-[email or web address]-avoid-injury OR  https://www.cdc.gov/steadi/patient.html For information on Fall & Injury Prevention, visit: [email or web address]://[email or web address]-[email or web address]-[email or web address]-mobility OR  [email or web address]://[email or web address]-injury OR  https://www.cdc.gov/steadi/patient.html

## 2022-09-09 NOTE — PROGRESS NOTE ADULT - SUBJECTIVE AND OBJECTIVE BOX
[facility]rological Care [facility]      Seen earlier today Date of service   No new neurological symptoms reported. Remains stable neurologically.   - Today, patient is without complaints.          *****MEDICATIONS: Current medication reviewed and documented.    MEDICATIONS  (STANDING):  amLODIPine   Tablet 10 milliGRAM(s) Oral daily  chlorthalidone 25 milliGRAM(s) Oral daily  heparin   Injectable 5000 Unit(s) SubCutaneous every 8 hours  nortriptyline 10 milliGRAM(s) Oral daily  tiZANidine 2 milliGRAM(s) Oral every 8 hours    MEDICATIONS  (PRN):  acetaminophen     Tablet .. 650 milliGRAM(s) Oral every 6 hours PRN Temp greater or equal to 38C (100.4F), Mild Pain (1 - 3)  ALBUTerol    90 MICROgram(s) HFA Inhaler 2 Puff(s) Inhalation every 4 hours PRN Shortness of Breath and/or Wheezing  LORazepam     Tablet 0.5 milliGRAM(s) Oral three times a day PRN Anxiety          ***** VITAL SIGNS:   Vital Signs Last 24 Hrs       T(F): 98.2 (09-09-22 @ 09:15), Max: 98.2 (09-09-22 @ 09:15)  HR: 92 (09-09-22 @ 09:15) (82 - 92)  BP: 151/92 (09-09-22 @ 09:15) (149/98 - 156/93)  RR: 18 (09-09-22 @ 09:15) (16 - 18)  SpO2: 100% (09-09-22 @ 09:15) (100% - 100%)  Wt(kg): --  I&O's Summary    08 Sep 2022 07:01  -  09 Sep 2022 07:00  --------------------------------------------------------  IN: 900 mL / OUT: 0 mL / NET: 900 mL             *****PHYSICAL EXAM:   alert oriented x 3 attention comprehension are fair.  Able to name, repeat.   EOmi fundi not visualized   no nystagmus VFF to confrontation  Tongue is midline  Palate elevates symmetrically   Moving all 4 ext spontaneously no drift appreciated    Gait not assessed.            *****LAB AND IMAGING:                        15.2   6.17  )-----------( 211      ( 09 Sep 2022 06:12 )             46.7               09-09    135  |  96<L>  |  24<H>  ----------------------------<  95  3.7   |  24  |  1.37<H>    Ca    9.3      09 Sep 2022 06:12  Phos  2.9     09-09  Mg     2.20     09-09                           [All pertinent recent Imaging/Reports reviewed]            *****A S S E S S M E N T   A N D   P L A N :      Excerpt from H&P,"     Pt is a 63 y/o male with pmhx of HTN and anxiety (on no medications) who presents to the ER for elevated blood pressure in the setting of 5 days of feeling fatigue and vague neck pain radiating down his back and R arm. HE reports that it might have started after he lifted a heavy trashbag. No weak or tingling sensation and no worsening on exertion. He denies any chest pain nor shortness of breath and denies any cardiac history. He does endorse mild cough and post nasal drip, otherwise no headache nor fever. No abd pain, diarrhea or dysuria. He has been having a good appetite. He does endorse feeling more anxious and slightly depressed recently due to being alone but denies any suicidal ideation. He reports he was on meds in the past but not recently. In the Er pt was found hypertensive and given amlodipine and hydralazine. Also given a dose of ativan for anxiety.  (04 Sep 2022 22:29)         Problem/Recommendations 1:  neck pain   likely musculoskeletal   warm compress tizanidine  denies any incontinence or saddle anesthesia  exam without any weakness   justin obtain non emergent mri  cspine emg if symptoms persist             Problem/Recommendations 2:    htn   currently started on meds   adjust as necessary       Thank you for allowing me to participate in the care of this patient. Will continue to follow patient periodically. Please do not hesitate to call me if you have any  questions or if there has been a change in patients neurological status     ________________  Teresa Castlilo MD  [facility]rological [facility] (PN)[facility]  [phone]      33 minutes spent on total encounter; more than 50 % of the visit was  spent counseling about plan of care, compliance to diet/exercise and medication regimen and or  coordinating care by the attending physician.      It is advised that stroke patients follow up with EUGENIO Blanca @ [phone] in 1- 2 weeks.   Others please follow up with Dr. Michael Nissenbaum [phone] [facility]      Seen earlier today Date of service   No new neurological symptoms reported. Remains stable neurologically.   - Today, patient is without complaints.          *****MEDICATIONS: Current medication reviewed and documented.    MEDICATIONS  (STANDING):  amLODIPine   Tablet 10 milliGRAM(s) Oral daily  chlorthalidone 25 milliGRAM(s) Oral daily  heparin   Injectable 5000 Unit(s) SubCutaneous every 8 hours  nortriptyline 10 milliGRAM(s) Oral daily  tiZANidine 2 milliGRAM(s) Oral every 8 hours    MEDICATIONS  (PRN):  acetaminophen     Tablet .. 650 milliGRAM(s) Oral every 6 hours PRN Temp greater or equal to 38C (100.4F), Mild Pain (1 - 3)  ALBUTerol    90 MICROgram(s) HFA Inhaler 2 Puff(s) Inhalation every 4 hours PRN Shortness of Breath and/or Wheezing  LORazepam     Tablet 0.5 milliGRAM(s) Oral three times a day PRN Anxiety          ***** VITAL SIGNS:   Vital Signs Last 24 Hrs       T(F): 98.2 (09-09-22 @ 09:15), Max: 98.2 (09-09-22 @ 09:15)  HR: 92 (09-09-22 @ 09:15) (82 - 92)  BP: 151/92 (09-09-22 @ 09:15) (149/98 - 156/93)  RR: 18 (09-09-22 @ 09:15) (16 - 18)  SpO2: 100% (09-09-22 @ 09:15) (100% - 100%)  Wt(kg): --  I&O's Summary    08 Sep 2022 07:01  -  09 Sep 2022 07:00  --------------------------------------------------------  IN: 900 mL / OUT: 0 mL / NET: 900 mL             *****PHYSICAL EXAM:   alert oriented x 3 attention comprehension are fair.  Able to name, repeat.   EOmi fundi not visualized   no nystagmus VFF to confrontation  Tongue is midline  Palate elevates symmetrically   Moving all 4 ext spontaneously no drift appreciated    Gait not assessed.            *****LAB AND IMAGING:                        15.2   6.17  )-----------( 211      ( 09 Sep 2022 06:12 )             46.7               09-09    135  |  96<L>  |  24<H>  ----------------------------<  95  3.7   |  24  |  1.37<H>    Ca    9.3      09 Sep 2022 06:12  Phos  2.9     09-09  Mg     2.20     09-09                           [All pertinent recent Imaging/Reports reviewed]            *****A S S E S S M E N T   A N D   P L A N :      Excerpt from H&P,"     Pt is a 63 y/o male with pmhx of HTN and anxiety (on no medications) who presents to the ER for elevated blood pressure in the setting of 5 days of feeling fatigue and vague neck pain radiating down his back and R arm. HE reports that it might have started after he lifted a heavy trashbag. No weak or tingling sensation and no worsening on exertion. He denies any chest pain nor shortness of breath and denies any cardiac history. He does endorse mild cough and post nasal drip, otherwise no headache nor fever. No abd pain, diarrhea or dysuria. He has been having a good appetite. He does endorse feeling more anxious and slightly depressed recently due to being alone but denies any suicidal ideation. He reports he was on meds in the past but not recently. In the Er pt was found hypertensive and given amlodipine and hydralazine. Also given a dose of ativan for anxiety.  (04 Sep 2022 22:29)         Problem/Recommendations 1:  neck pain   likely musculoskeletal   warm compress tizanidine  denies any incontinence or saddle anesthesia  exam without any weakness   justin obtain non emergent mri  cspine emg if symptoms persist             Problem/Recommendations 2:    htn   currently started on meds   adjust as necessary       Thank you for allowing me to participate in the care of this patient. Will continue to follow patient periodically. Please do not hesitate to call me if you have any  questions or if there has been a change in patients neurological status     ________________  Teresa Castillo MD  [facility]rological [facility] (PN)[facility]  [phone]      33 minutes spent on total encounter; more than 50 % of the visit was  spent counseling about plan of care, compliance to diet/exercise and medication regimen and or  coordinating care by the attending physician.      It is advised that stroke patients follow up with EUGENIO Blanca @ [phone] in 1- 2 weeks.   Others please follow up with Dr. Michael Nissenbaum [phone] [facility]      Seen earlier today Date of service   No new neurological symptoms reported. Remains stable neurologically.   - Today, patient is without complaints.          *****MEDICATIONS: Current medication reviewed and documented.    MEDICATIONS  (STANDING):  amLODIPine   Tablet 10 milliGRAM(s) Oral daily  chlorthalidone 25 milliGRAM(s) Oral daily  heparin   Injectable 5000 Unit(s) SubCutaneous every 8 hours  nortriptyline 10 milliGRAM(s) Oral daily  tiZANidine 2 milliGRAM(s) Oral every 8 hours    MEDICATIONS  (PRN):  acetaminophen     Tablet .. 650 milliGRAM(s) Oral every 6 hours PRN Temp greater or equal to 38C (100.4F), Mild Pain (1 - 3)  ALBUTerol    90 MICROgram(s) HFA Inhaler 2 Puff(s) Inhalation every 4 hours PRN Shortness of Breath and/or Wheezing  LORazepam     Tablet 0.5 milliGRAM(s) Oral three times a day PRN Anxiety          ***** VITAL SIGNS:   Vital Signs Last 24 Hrs       T(F): 98.2 (09-09-22 @ 09:15), Max: 98.2 (09-09-22 @ 09:15)  HR: 92 (09-09-22 @ 09:15) (82 - 92)  BP: 151/92 (09-09-22 @ 09:15) (149/98 - 156/93)  RR: 18 (09-09-22 @ 09:15) (16 - 18)  SpO2: 100% (09-09-22 @ 09:15) (100% - 100%)  Wt(kg): --  I&O's Summary    08 Sep 2022 07:01  -  09 Sep 2022 07:00  --------------------------------------------------------  IN: 900 mL / OUT: 0 mL / NET: 900 mL             *****PHYSICAL EXAM:   alert oriented x 3 attention comprehension are fair.  Able to name, repeat.   EOmi fundi not visualized   no nystagmus VFF to confrontation  Tongue is midline  Palate elevates symmetrically   Moving all 4 ext spontaneously no drift appreciated    Gait not assessed.            *****LAB AND IMAGING:                        15.2   6.17  )-----------( 211      ( 09 Sep 2022 06:12 )             46.7               09-09    135  |  96<L>  |  24<H>  ----------------------------<  95  3.7   |  24  |  1.37<H>    Ca    9.3      09 Sep 2022 06:12  Phos  2.9     09-09  Mg     2.20     09-09                           [All pertinent recent Imaging/Reports reviewed]            *****A S S E S S M E N T   A N D   P L A N :      Excerpt from H&P,"     Pt is a 61 y/o male with pmhx of HTN and anxiety (on no medications) who presents to the ER for elevated blood pressure in the setting of 5 days of feeling fatigue and vague neck pain radiating down his back and R arm. HE reports that it might have started after he lifted a heavy trashbag. No weak or tingling sensation and no worsening on exertion. He denies any chest pain nor shortness of breath and denies any cardiac history. He does endorse mild cough and post nasal drip, otherwise no headache nor fever. No abd pain, diarrhea or dysuria. He has been having a good appetite. He does endorse feeling more anxious and slightly depressed recently due to being alone but denies any suicidal ideation. He reports he was on meds in the past but not recently. In the Er pt was found hypertensive and given amlodipine and hydralazine. Also given a dose of ativan for anxiety.  (04 Sep 2022 22:29)         Problem/Recommendations 1:  neck pain   likely musculoskeletal   warm compress tizanidine  denies any incontinence or saddle anesthesia  exam without any weakness   justin obtain non emergent mri  cspine emg if symptoms persist             Problem/Recommendations 2:    htn   currently started on meds   adjust as necessary       Thank you for allowing me to participate in the care of this patient. Will continue to follow patient periodically. Please do not hesitate to call me if you have any  questions or if there has been a change in patients neurological status     ________________  Teresa Castillo MD  [facility] Neurological [facility] (PN)[facility]  [phone]      33 minutes spent on total encounter; more than 50 % of the visit was  spent counseling about plan of care, compliance to diet/exercise and medication regimen and or  coordinating care by the attending physician.      It is advised that stroke patients follow up with EUGEINO Blanca @ [phone] in 1- 2 weeks.   Others please follow up with Dr. Michael Nissenbaum [phone]

## 2022-09-09 NOTE — PROGRESS NOTE ADULT - SUBJECTIVE AND OBJECTIVE BOX
[facility] [phone]        Patient seen and examined no new complaints.         MEDICATIONS  (STANDING):  amLODIPine   Tablet 10 milliGRAM(s) Oral daily  chlorthalidone 25 milliGRAM(s) Oral daily  heparin   Injectable 5000 Unit(s) SubCutaneous every 8 hours  nortriptyline 10 milliGRAM(s) Oral daily  tiZANidine 2 milliGRAM(s) Oral every 8 hours      VITAL:  T(C): , Max: 36.8 (09-09-22 @ 09:15)  T(F): , Max: 98.2 (09-09-22 @ 09:15)  HR: 92 (09-09-22 @ 09:15)  BP: 151/92 (09-09-22 @ 09:15)  BP(mean): --  RR: 18 (09-09-22 @ 09:15)  SpO2: 100% (09-09-22 @ 09:15)  Wt(kg): --    I and O's:    09-08 @ 07:01  -  09-09 @ 07:00  --------------------------------------------------------  IN: 900 mL / OUT: 0 mL / NET: 900 mL          PHYSICAL EXAM:    Constitutional: NAD  Neck:  No JVD  Respiratory: CTAB/L  Cardiovascular: S1 and S2  Gastrointestinal: BS+, soft, NT/ND  Extremities: No peripheral edema  Neurological: A/O x 3, no focal deficits  Psychiatric: Normal mood, normal affect  : No Elliott  Skin: No rashes  Access: Not applicable    LABS:                        15.2   6.17  )-----------( 211      ( 09 Sep 2022 06:12 )             46.7     09-09    135  |  96<L>  |  24<H>  ----------------------------<  95  3.7   |  24  |  1.37<H>    Ca    9.3      09 Sep 2022 06:12  Phos  2.9     09-09  Mg     2.20     09-09          ASSESSMENT:  (1)Renal - nonproteinuric CKD3a - likely hypertensive nephrosclerosis - fluctuating numbers based on hemodynamics  (2)CV - BP mildly high at present but significantly improved relative to admission and acceptable for now    RECOMMEND:  (1)Continue antihypertensives as ordered  (2)No renal objection to discharged, follow-up with Dr Hernandez in the office in 1-2 months    Jada Bradshaw NP   [facility]  Office: [phone]         [facility] [phone]        Patient seen and examined no new complaints.         MEDICATIONS  (STANDING):  amLODIPine   Tablet 10 milliGRAM(s) Oral daily  chlorthalidone 25 milliGRAM(s) Oral daily  heparin   Injectable 5000 Unit(s) SubCutaneous every 8 hours  nortriptyline 10 milliGRAM(s) Oral daily  tiZANidine 2 milliGRAM(s) Oral every 8 hours      VITAL:  T(C): , Max: 36.8 (09-09-22 @ 09:15)  T(F): , Max: 98.2 (09-09-22 @ 09:15)  HR: 92 (09-09-22 @ 09:15)  BP: 151/92 (09-09-22 @ 09:15)  BP(mean): --  RR: 18 (09-09-22 @ 09:15)  SpO2: 100% (09-09-22 @ 09:15)  Wt(kg): --    I and O's:    09-08 @ 07:01  -  09-09 @ 07:00  --------------------------------------------------------  IN: 900 mL / OUT: 0 mL / NET: 900 mL          PHYSICAL EXAM:    Constitutional: NAD  Neck:  No JVD  Respiratory: CTAB/L  Cardiovascular: S1 and S2  Gastrointestinal: BS+, soft, NT/ND  Extremities: No peripheral edema  Neurological: A/O x 3, no focal deficits  Psychiatric: Normal mood, normal affect  : No Elliott  Skin: No rashes  Access: Not applicable    LABS:                        15.2   6.17  )-----------( 211      ( 09 Sep 2022 06:12 )             46.7     09-09    135  |  96<L>  |  24<H>  ----------------------------<  95  3.7   |  24  |  1.37<H>    Ca    9.3      09 Sep 2022 06:12  Phos  2.9     09-09  Mg     2.20     09-09          ASSESSMENT:  (1)Renal - nonproteinuric CKD3a - likely hypertensive nephrosclerosis - fluctuating numbers based on hemodynamics  (2)CV - BP mildly high at present but significantly improved relative to admission and acceptable for now    RECOMMEND:  (1)Continue antihypertensives as ordered  (2)No renal objection to discharged, follow-up with Dr Hernandez in the office in 1-2 months    Jada Bradshaw NP   [facility]  Office: [phone]         [facility] [phone]        Patient seen and examined no new complaints.         MEDICATIONS  (STANDING):  amLODIPine   Tablet 10 milliGRAM(s) Oral daily  chlorthalidone 25 milliGRAM(s) Oral daily  heparin   Injectable 5000 Unit(s) SubCutaneous every 8 hours  nortriptyline 10 milliGRAM(s) Oral daily  tiZANidine 2 milliGRAM(s) Oral every 8 hours      VITAL:  T(C): , Max: 36.8 (09-09-22 @ 09:15)  T(F): , Max: 98.2 (09-09-22 @ 09:15)  HR: 92 (09-09-22 @ 09:15)  BP: 151/92 (09-09-22 @ 09:15)  BP(mean): --  RR: 18 (09-09-22 @ 09:15)  SpO2: 100% (09-09-22 @ 09:15)  Wt(kg): --    I and O's:    09-08 @ 07:01  -  09-09 @ 07:00  --------------------------------------------------------  IN: 900 mL / OUT: 0 mL / NET: 900 mL          PHYSICAL EXAM:    Constitutional: NAD  Neck:  No JVD  Respiratory: CTAB/L  Cardiovascular: S1 and S2  Gastrointestinal: BS+, soft, NT/ND  Extremities: No peripheral edema  Neurological: A/O x 3, no focal deficits  Psychiatric: Normal mood, normal affect  : No Elliott  Skin: No rashes  Access: Not applicable    LABS:                        15.2   6.17  )-----------( 211      ( 09 Sep 2022 06:12 )             46.7     09-09    135  |  96<L>  |  24<H>  ----------------------------<  95  3.7   |  24  |  1.37<H>    Ca    9.3      09 Sep 2022 06:12  Phos  2.9     09-09  Mg     2.20     09-09          ASSESSMENT:  (1)Renal - nonproteinuric CKD3a - likely hypertensive nephrosclerosis - fluctuating numbers based on hemodynamics  (2)CV - BP mildly high at present but significantly improved relative to admission and acceptable for now    RECOMMEND:  (1)Continue antihypertensives as ordered  (2)No renal objection to discharged, follow-up with Dr Hernandez in the office in 1-2 months    Jada Bradshaw NP   [facility]  Office: [phone]         [facility] [phone]        Patient seen and examined no new complaints.         MEDICATIONS  (STANDING):  amLODIPine   Tablet 10 milliGRAM(s) Oral daily  chlorthalidone 25 milliGRAM(s) Oral daily  heparin   Injectable 5000 Unit(s) SubCutaneous every 8 hours  nortriptyline 10 milliGRAM(s) Oral daily  tiZANidine 2 milliGRAM(s) Oral every 8 hours      VITAL:  T(C): , Max: 36.8 (09-09-22 @ 09:15)  T(F): , Max: 98.2 (09-09-22 @ 09:15)  HR: 92 (09-09-22 @ 09:15)  BP: 151/92 (09-09-22 @ 09:15)  BP(mean): --  RR: 18 (09-09-22 @ 09:15)  SpO2: 100% (09-09-22 @ 09:15)  Wt(kg): --    I and O's:    09-08 @ 07:01  -  09-09 @ 07:00  --------------------------------------------------------  IN: 900 mL / OUT: 0 mL / NET: 900 mL          PHYSICAL EXAM:    Constitutional: NAD  Neck:  No JVD  Respiratory: CTAB/L  Cardiovascular: S1 and S2  Gastrointestinal: BS+, soft, NT/ND  Extremities: No peripheral edema  Neurological: A/O x 3, no focal deficits  Psychiatric: Normal mood, normal affect  : No Elliott  Skin: No rashes  Access: Not applicable    LABS:                        15.2   6.17  )-----------( 211      ( 09 Sep 2022 06:12 )             46.7     09-09    135  |  96<L>  |  24<H>  ----------------------------<  95  3.7   |  24  |  1.37<H>    Ca    9.3      09 Sep 2022 06:12  Phos  2.9     09-09  Mg     2.20     09-09          ASSESSMENT: 62 year-old man with medical history prior to admission only significant for hypertension. Presents with fatigue and vague neck pain radiating down his back and right arm for 5 days.     (1)Renal - nonproteinuric CKD3a - likely hypertensive nephrosclerosis - fluctuating numbers based on hemodynamics  (2)CV - BP mildly high at present but significantly improved relative to admission and acceptable for now    RECOMMEND:  (1)Continue antihypertensives as ordered  (2)No renal objection to discharged, follow-up with Dr Hernandez in the office in 1-2 months    Jada Bradshaw NP   [facility]  Office: [phone]         [facility] [phone]        Patient seen and examined no new complaints.         MEDICATIONS  (STANDING):  amLODIPine   Tablet 10 milliGRAM(s) Oral daily  chlorthalidone 25 milliGRAM(s) Oral daily  heparin   Injectable 5000 Unit(s) SubCutaneous every 8 hours  nortriptyline 10 milliGRAM(s) Oral daily  tiZANidine 2 milliGRAM(s) Oral every 8 hours      VITAL:  T(C): , Max: 36.8 (09-09-22 @ 09:15)  T(F): , Max: 98.2 (09-09-22 @ 09:15)  HR: 92 (09-09-22 @ 09:15)  BP: 151/92 (09-09-22 @ 09:15)  BP(mean): --  RR: 18 (09-09-22 @ 09:15)  SpO2: 100% (09-09-22 @ 09:15)  Wt(kg): --    I and O's:    09-08 @ 07:01  -  09-09 @ 07:00  --------------------------------------------------------  IN: 900 mL / OUT: 0 mL / NET: 900 mL          PHYSICAL EXAM:    Constitutional: NAD  Neck:  No JVD  Respiratory: CTAB/L  Cardiovascular: S1 and S2  Gastrointestinal: BS+, soft, NT/ND  Extremities: No peripheral edema  Neurological: A/O x 3, no focal deficits  Psychiatric: Normal mood, normal affect  : No Elliott  Skin: No rashes  Access: Not applicable    LABS:                        15.2   6.17  )-----------( 211      ( 09 Sep 2022 06:12 )             46.7     09-09    135  |  96<L>  |  24<H>  ----------------------------<  95  3.7   |  24  |  1.37<H>    Ca    9.3      09 Sep 2022 06:12  Phos  2.9     09-09  Mg     2.20     09-09          ASSESSMENT: 62 year-old man with medical history prior to admission only significant for hypertension. Presents with fatigue and vague neck pain radiating down his back and right arm for 5 days.     (1)Renal - nonproteinuric CKD3a - likely hypertensive nephrosclerosis - fluctuating numbers based on hemodynamics  (2)CV - BP mildly high at present but significantly improved relative to admission and acceptable for now    RECOMMEND:  (1)Continue antihypertensives as ordered  (2)No renal objection to discharged, follow-up with Dr Hernandez in the office in 1-2 months    Jada Bradshaw NP   [facility]  Office: [phone]         [facility] [phone]        Patient seen and examined no new complaints.         MEDICATIONS  (STANDING):  amLODIPine   Tablet 10 milliGRAM(s) Oral daily  chlorthalidone 25 milliGRAM(s) Oral daily  heparin   Injectable 5000 Unit(s) SubCutaneous every 8 hours  nortriptyline 10 milliGRAM(s) Oral daily  tiZANidine 2 milliGRAM(s) Oral every 8 hours      VITAL:  T(C): , Max: 36.8 (09-09-22 @ 09:15)  T(F): , Max: 98.2 (09-09-22 @ 09:15)  HR: 92 (09-09-22 @ 09:15)  BP: 151/92 (09-09-22 @ 09:15)  BP(mean): --  RR: 18 (09-09-22 @ 09:15)  SpO2: 100% (09-09-22 @ 09:15)  Wt(kg): --    I and O's:    09-08 @ 07:01  -  09-09 @ 07:00  --------------------------------------------------------  IN: 900 mL / OUT: 0 mL / NET: 900 mL          PHYSICAL EXAM:    Constitutional: NAD  Neck:  No JVD  Respiratory: CTAB/L  Cardiovascular: S1 and S2  Gastrointestinal: BS+, soft, NT/ND  Extremities: No peripheral edema  Neurological: A/O x 3, no focal deficits  Psychiatric: Normal mood, normal affect  : No Elliott  Skin: No rashes  Access: Not applicable    LABS:                        15.2   6.17  )-----------( 211      ( 09 Sep 2022 06:12 )             46.7     09-09    135  |  96<L>  |  24<H>  ----------------------------<  95  3.7   |  24  |  1.37<H>    Ca    9.3      09 Sep 2022 06:12  Phos  2.9     09-09  Mg     2.20     09-09          ASSESSMENT: 62 year-old man with medical history prior to admission only significant for hypertension. Presents with fatigue and vague neck pain radiating down his back and right arm for 5 days.     (1)Renal - nonproteinuric CKD3a - likely hypertensive nephrosclerosis - fluctuating numbers based on hemodynamics  (2)CV - BP mildly high at present but significantly improved relative to admission and acceptable for now    RECOMMEND:  (1)Continue antihypertensives as ordered  (2)No renal objection to discharged, follow-up with Dr Hernandez in the office in 1-2 months    Jada Bradshaw NP   [facility]  Office: [phone]         [facility] [phone]        Patient seen and examined no new complaints.         MEDICATIONS  (STANDING):  amLODIPine   Tablet 10 milliGRAM(s) Oral daily  chlorthalidone 25 milliGRAM(s) Oral daily  heparin   Injectable 5000 Unit(s) SubCutaneous every 8 hours  nortriptyline 10 milliGRAM(s) Oral daily  tiZANidine 2 milliGRAM(s) Oral every 8 hours      VITAL:  T(C): , Max: 36.8 (09-09-22 @ 09:15)  T(F): , Max: 98.2 (09-09-22 @ 09:15)  HR: 92 (09-09-22 @ 09:15)  BP: 151/92 (09-09-22 @ 09:15)  BP(mean): --  RR: 18 (09-09-22 @ 09:15)  SpO2: 100% (09-09-22 @ 09:15)  Wt(kg): --    I and O's:    09-08 @ 07:01  -  09-09 @ 07:00  --------------------------------------------------------  IN: 900 mL / OUT: 0 mL / NET: 900 mL          PHYSICAL EXAM:    Constitutional: NAD  Neck:  No JVD  Respiratory: CTAB/L  Cardiovascular: S1 and S2  Gastrointestinal: BS+, soft, NT/ND  Extremities: No peripheral edema  Neurological: A/O x 3, no focal deficits  Psychiatric: Normal mood, normal affect  : No Elliott  Skin: No rashes  Access: Not applicable    LABS:                        15.2   6.17  )-----------( 211      ( 09 Sep 2022 06:12 )             46.7     09-09    135  |  96<L>  |  24<H>  ----------------------------<  95  3.7   |  24  |  1.37<H>    Ca    9.3      09 Sep 2022 06:12  Phos  2.9     09-09  Mg     2.20     09-09          ASSESSMENT: 62 year-old man with medical history prior to admission only significant for hypertension. Presents with fatigue and vague neck pain radiating down his back and right arm for 5 days.     (1)Renal - nonproteinuric CKD3a - likely hypertensive nephrosclerosis - fluctuating numbers based on hemodynamics  (2)CV - BP mildly high at present but significantly improved relative to admission and acceptable for now    RECOMMEND:  (1)Continue antihypertensives as ordered  (2)No renal objection to discharged, follow-up with Dr Hernandez in the office in 1-2 months    Jada Bradshaw NP   [facility]  Office: [phone]      RENAL ATTENDING NOTE  Patient seen and examined with NP. Agree with assessment and plan as above.    Faisal Hernandez MD  [facility]  [phone]   [facility] [phone]        Patient seen and examined no new complaints.         MEDICATIONS  (STANDING):  amLODIPine   Tablet 10 milliGRAM(s) Oral daily  chlorthalidone 25 milliGRAM(s) Oral daily  heparin   Injectable 5000 Unit(s) SubCutaneous every 8 hours  nortriptyline 10 milliGRAM(s) Oral daily  tiZANidine 2 milliGRAM(s) Oral every 8 hours      VITAL:  T(C): , Max: 36.8 (09-09-22 @ 09:15)  T(F): , Max: 98.2 (09-09-22 @ 09:15)  HR: 92 (09-09-22 @ 09:15)  BP: 151/92 (09-09-22 @ 09:15)  BP(mean): --  RR: 18 (09-09-22 @ 09:15)  SpO2: 100% (09-09-22 @ 09:15)  Wt(kg): --    I and O's:    09-08 @ 07:01  -  09-09 @ 07:00  --------------------------------------------------------  IN: 900 mL / OUT: 0 mL / NET: 900 mL          PHYSICAL EXAM:    Constitutional: NAD  Neck:  No JVD  Respiratory: CTAB/L  Cardiovascular: S1 and S2  Gastrointestinal: BS+, soft, NT/ND  Extremities: No peripheral edema  Neurological: A/O x 3, no focal deficits  Psychiatric: Normal mood, normal affect  : No Elliott  Skin: No rashes  Access: Not applicable    LABS:                        15.2   6.17  )-----------( 211      ( 09 Sep 2022 06:12 )             46.7     09-09    135  |  96<L>  |  24<H>  ----------------------------<  95  3.7   |  24  |  1.37<H>    Ca    9.3      09 Sep 2022 06:12  Phos  2.9     09-09  Mg     2.20     09-09          ASSESSMENT: 62 year-old man with medical history prior to admission only significant for hypertension. Presents with fatigue and vague neck pain radiating down his back and right arm for 5 days.     (1)Renal - nonproteinuric CKD3a - likely hypertensive nephrosclerosis - fluctuating numbers based on hemodynamics  (2)CV - BP mildly high at present but significantly improved relative to admission and acceptable for now    RECOMMEND:  (1)Continue antihypertensives as ordered  (2)No renal objection to discharged, follow-up with Dr Hernandez in the office in 1-2 months    Jada Bradshaw NP   [facility]  Office: [phone]      RENAL ATTENDING NOTE  Patient seen and examined with NP. Agree with assessment and plan as above.    Faisal Hernandez MD  [facility]  [phone]   [facility] [phone]        Patient seen and examined no new complaints.         MEDICATIONS  (STANDING):  amLODIPine   Tablet 10 milliGRAM(s) Oral daily  chlorthalidone 25 milliGRAM(s) Oral daily  heparin   Injectable 5000 Unit(s) SubCutaneous every 8 hours  nortriptyline 10 milliGRAM(s) Oral daily  tiZANidine 2 milliGRAM(s) Oral every 8 hours      VITAL:  T(C): , Max: 36.8 (09-09-22 @ 09:15)  T(F): , Max: 98.2 (09-09-22 @ 09:15)  HR: 92 (09-09-22 @ 09:15)  BP: 151/92 (09-09-22 @ 09:15)  BP(mean): --  RR: 18 (09-09-22 @ 09:15)  SpO2: 100% (09-09-22 @ 09:15)  Wt(kg): --    I and O's:    09-08 @ 07:01  -  09-09 @ 07:00  --------------------------------------------------------  IN: 900 mL / OUT: 0 mL / NET: 900 mL          PHYSICAL EXAM:    Constitutional: NAD  Neck:  No JVD  Respiratory: CTAB/L  Cardiovascular: S1 and S2  Gastrointestinal: BS+, soft, NT/ND  Extremities: No peripheral edema  Neurological: A/O x 3, no focal deficits  Psychiatric: Normal mood, normal affect  : No Elliott  Skin: No rashes  Access: Not applicable    LABS:                        15.2   6.17  )-----------( 211      ( 09 Sep 2022 06:12 )             46.7     09-09    135  |  96<L>  |  24<H>  ----------------------------<  95  3.7   |  24  |  1.37<H>    Ca    9.3      09 Sep 2022 06:12  Phos  2.9     09-09  Mg     2.20     09-09          ASSESSMENT: 62 year-old man with medical history prior to admission only significant for hypertension. Presents with fatigue and vague neck pain radiating down his back and right arm for 5 days.     (1)Renal - nonproteinuric CKD3a - likely hypertensive nephrosclerosis - fluctuating numbers based on hemodynamics  (2)CV - BP mildly high at present but significantly improved relative to admission and acceptable for now    RECOMMEND:  (1)Continue antihypertensives as ordered  (2)No renal objection to discharged, follow-up with Dr Hernandez in the office in 1-2 months    Jada Bradshaw NP   [facility]  Office: [phone]      RENAL ATTENDING NOTE  Patient seen and examined with NP. Agree with assessment and plan as above.    Faisal Hernandez MD  [facility]  [phone]

## 2022-12-19 NOTE — ED ADULT NURSE NOTE - DRUG PRE-SCREENING (DAST -1)
23 yo male with CD for 3 day f/u of path and urgent request for Stelara re-inductuion.  Stelara level was likely falsely low becasue he was getting his Stelara and injfecting evering every 9-10 weeks.  Likely will have good level if getting Stelara every 8 weeks and wont need iv rein Also labs all nl and bx all nl in colon and ilum False pos visual CD of ileum possbile.  re: Esoph abnormality,  When he was a baby he had difficulty breathing. He had some type of scan. An artery is wrapped around the esophagus. 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 They were told it should not cause any abnormality. Nothing has been said.  Perry goes back to [place] around new years. Should I GET   CT or MRI to eval esophagus  The important thing is to clarify the imaging.  During his Crohns diagnosis and he saw a cardiologist, told again that is artery is fine. Cardiologist was fine.  Question is what is the abn and is there any clinical implication beyond the narrowing seen.  Maybe both a Barium swallow and a CT scan should be done.      ======================= 23 yo male with CD for TH f/u.  Dad has covid, and so they could not come from [place].  Perry has not had bivalent booster.  Wanted to review path from  procedure but not back yet.  Doing the same as 1 month ago.  Did not have any problemes.  Taking Stelara every 8 weeks.  No heartburn or esophageal complaints.  His trough uste level was 1.3, 6 months ago. He is going back to [place] where he will be staying. Needs iv re-induction due to low trough level., even though he has no pain diarrhea or bleeding,    ==================== 22  23 yo male comes in for f2f with father Adi. 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 Living the life. Back home for check up.  Feeling fine. Only time he has a problem is just before the Stelara Proactive monitoring rather than reactive monitroing. Awaiting Stelara and other labs.  Dx with CD 2020, dr. Bran. Rx only with Stelara started 2020. Before Stelara, had appy x2. 22------ Let him know i reviewed his stool tests which were nl, and so, if he had been taking stelara q 10, just move up to q 8 and that will be fine ============================= 6/3/22 and prior TH on 20  23 yo male for telehealth visit. Was in [place] and now in [place] and doing music.  Crohn's disease has been stable. No recurrence of syx. I started him on Stelara. Hadnot been on other biologics.  Currently no pain diarrhea or bleeding.  He went and had labs a couple weeks ago and 22 labs showed  ESR 24/15 CRP-107!!!~ nl cbc fe5% Vit D -100   Day of labs was in [place] and had bad allergies and that might have causedd the CRP to be so high and so will repeat.   ========================== 2020  21 yo male with Crohn's disease dx this year.  Feeling the best he has in the last 6 months.  CD was ongoing in 2019 and was not diagnosed.  Primarily  the abd pain.  Had some diarrhea, only food triggered.  Had diarrhea up to 5x a day. No bleeding. Abd pain was moderate.  Abd pain is gone. Diarrhea is resolved. Stooling nl.  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  Meds: Stelara 90 mg q 8 weeks Vit D 10,000 u  Fluoxetine 20 mg a day ---- it has helped the anxiety.  He has more energy.  Follow up in 2 months.  everything is good.   Had a borderline b12, elevated liver test, and high calpro and lact values. will want to f/u on these and have him do a f2f in 3 months and a colonoscopy in early 20  This is a scheduled appointment for this patient, a 22 year old male, after a previous visit approximately Crohn's disease.     21 yo male seen 3 weeks ago presents for telehealth audio visit as video not connected.  Syx are the same.  Abdominal pain is the same as a few weeks ago. Pain is 1 hour or less. Pain is 10 minutes or less.  No constipation or diarrhea.  No bleeding.  Tapering prednisone and is down to 10 mg or 1 pill a day.  On flagyl 500 bid.  He does not know why he does not feel well.  He does not feel the Crohn's is a problem right now.  Physically with the Crohn's is ok.  Mentally, not good. Been on 1 pill a day and had an 8 week course. Got the Stelara 5 days ago.  Stop the prednisone. Taking Vit D. B12 of 299. labs generally good ----- lft 111. No alcohol  His mind is racing. He can't get his mind to settle. This was an issue from before.    ===== 3/23/20 21 yo male referred by Emil Bran MD for Crohn's disease care and for evaluation and treatment.  Syx started summer 2019 and they have been intermittent.  He thought syx were muscular.  Had pain for 2 weeks but severe for 2 days before presenting to the ER on 19.  Dr. Abreu-surgeon - saw him in the ER, got a CT scan, and did a lap appendectomy.  Went home the same.    Was on cipro for 10 days and might have been better.  Pain was better over the next few weeks.  Went back to the ER because of abdominal pain and was reassured.  One week later, went back to the ER due to abdominal pain,did a CT scan, and was told there was inflammation and scar tissue.  Pain was moderate, mostly with activity.  Over the next 2 months, syx were intermittent, saw family medicine, saw the surgeon.  , Dr. Agustin, [facility] surgery, ordered another CT due to pain, and was admitted  and had stump appendectomy and graunulomas were seen on that bx.  After second surgery was on cipro and flagyl and just finished them both and tolerated them fine and has been feeling better than in past.  10 days of both after second surgery.  Colonoscopy on  and this showed ileocecal ulceration and bx c/w Crohn's disease. Granulomas not seen.  Granulation tissue and crypt abscesses.  Was on 40 mg prednisone a day and this is second week of 30 mg a day.  8 week course/taper.  Currently on prednisone 30 mg a day.  Has some low grade intermittent abdominal pain.  Ate junk food.  Syx  2 stools a day No pain for days. Energy       Review of Systems  ConstitutionalDenies : body aches, fever, weight gain, weight loss  CardiovascularDenies : chest pain, heart racing/skipping, high blood pressure  RespiratoryDenies : chronic cough, shortness of breath, wheezing or asthma symptoms  GastrointestinalDenies : Abdominal Pain/discomfort, Anal/Rectal Pain or Itching, Anal Spasm, Black Stool, Bloating/belching/gaseouness, Change of Bowel Habit, Constipation, Diarrhea/Loose Stool, Difficulty in Swallowing, Heartburn/esophageal reflux, Hemorrhoids, Indigestion, Mucus in Stool, Nausea/vomiting, Rectal Bleeding, Unintentional Weight Loss    Vitals  Date Time BP Position Site L\R Cuff Size HR RR TEMP (F) WT  HT  BMI kg/m2 BSA m2 O2 Sat HC     2020 09:51 AM         161lbs 0oz 6'   21.84 1.93          Assessment  Crohn's Disease     555.2  Elevated LFTs     790.6/R94.5   Problems Reconciled  Plan  OrdersPatient not identified as an unhealthy alcohol user when screene () - - 2020  Influenza immunization administered or previously received () - - 2020  BMI screening above normal () - - 2020  Current tobacco non-user (CAD, cap, COPD, PV) (dm) (IBD) (1036F, ) - - 2020  Colorectal cancer screening results documented and reviewed (PV) (3017F) - - 2020  Documentation of current medications. () - - 2020  MedicationsMedications have been Reconciled  Transition of Care or Provider Policy  InstructionsPatient seen today via telehealth by agreement and consent of patient in light of current COVID-19 pandemic. I used video conferencing during the visit. The patient encounter is appropriate and reasonable under the circumstances given the patient's particular presentation at this time. The patient has been advised of the followin) the potential risks and limitations of this mode of treatment (including but not limited to the absence of in-person examination); 2) the right to refuse telehealth services at any point without affecting the right to future care; 3) the right to receive in-person services, included immediately after this consultation if an urgent need arises; 4) information, including identifiable images or information from this telehealth consult, will only be shared in accordance with HIPPA regulations. Any and all of the patient's and/or patient's family member's questions on this issue have been answered. The patient has verbally consented to be treated via telehealth services. The patient has also been advised to contact this office for worsening conditions or problems, and seek emergency medical treatment and/or call 911 if the patient deems either necessary.  Telephone E/M 16-20 minutes ([id number] NP/EP)  I have documented a list of current medications and reviewed it with the patient.  I encouraged the patient to diet and exercise.  DispositionReturn To Clinic in 1 Month    19200 visit. 16 minutes.  telehealth in a month  d/c flagyl d/c prednisone       ----------22  et him know i reviewed his stool tests which were nl, and so, if he had been taking stelara q 10, just move up to q 8 and that will be fine  --------------------------- Electronically Signed by: Norris Panchal MD -Author on 2020 02:53:19 PM Statement Selected

## 2023-02-09 PROBLEM — Z00.00 ENCOUNTER FOR PREVENTIVE HEALTH EXAMINATION: Status: ACTIVE | Noted: 2023-02-09

## 2023-09-29 ENCOUNTER — NON-APPOINTMENT (OUTPATIENT)
Age: 63
End: 2023-09-29

## 2024-04-01 PROBLEM — F41.9 ANXIETY DISORDER, UNSPECIFIED: Chronic | Status: ACTIVE | Noted: 2022-09-04

## 2024-04-01 PROBLEM — I10 ESSENTIAL (PRIMARY) HYPERTENSION: Chronic | Status: ACTIVE | Noted: 2022-09-04

## 2024-04-02 ENCOUNTER — APPOINTMENT (OUTPATIENT)
Dept: SURGICAL ONCOLOGY | Facility: CLINIC | Age: 64
End: 2024-04-02
Payer: COMMERCIAL

## 2024-04-02 VITALS
HEIGHT: 66 IN | OXYGEN SATURATION: 99 % | SYSTOLIC BLOOD PRESSURE: 124 MMHG | HEART RATE: 95 BPM | BODY MASS INDEX: 33.75 KG/M2 | RESPIRATION RATE: 17 BRPM | DIASTOLIC BLOOD PRESSURE: 78 MMHG | WEIGHT: 210 LBS

## 2024-04-02 DIAGNOSIS — Z86.39 PERSONAL HISTORY OF OTHER ENDOCRINE, NUTRITIONAL AND METABOLIC DISEASE: ICD-10-CM

## 2024-04-02 DIAGNOSIS — Z86.79 PERSONAL HISTORY OF OTHER DISEASES OF THE CIRCULATORY SYSTEM: ICD-10-CM

## 2024-04-02 PROCEDURE — 99204 OFFICE O/P NEW MOD 45 MIN: CPT

## 2024-04-02 NOTE — ASSESSMENT
[FreeTextEntry1] : 65 y/o male presents with partially obstructing splenic flexure mass. Biopsy pending but highly concerning for cancer. We reviewed colonoscopy findings. Obtain blood work today, CBC, CMP, CEA. Initial staging CT chest/abdomen/pelvis to evaluate for metastatic disease. Due to symptoms of obstruction, he will require upfront surgical resection ASAP. He is a candidate for robotic approach and understands he will need bowel preparation.  Possibility of stoma was also discussed. Risks/benefits/surgical details and post-operative recovery discussed. All questions answered. Medical/cardiology preoperative evaluation.

## 2024-04-02 NOTE — REASON FOR VISIT
[Initial Consultation] : an initial consultation for [Other: _____] : [unfilled] [Referred By: ___] : Referred By: [unfilled] [FreeTextEntry2] : splenic flexure mass, suspected malignancy.

## 2024-04-02 NOTE — CONSULT LETTER
[Dear  ___] : Dear  [unfilled], [Consult Letter:] : I had the pleasure of evaluating your patient, [unfilled]. [Please see my note below.] : Please see my note below. [Consult Closing:] : Thank you very much for allowing me to participate in the care of this patient.  If you have any questions, please do not hesitate to contact me. [Sincerely,] : Sincerely, [FreeTextEntry2] : Dr. Phuc Calle [FreeTextEntry3] : Mervin Guillen (M) [phone] (O) [phone] [DrBrayden  ___] : Dr. ARIAS

## 2024-04-02 NOTE — REVIEW OF SYSTEMS
[Negative] : Heme/Lymph [FreeTextEntry8] : blood per rectum, incomplete stool evacuation [FreeTextEntry2] : anxious

## 2024-04-02 NOTE — PHYSICAL EXAM
[Normal] : supple, no neck mass and thyroid not enlarged [Normal Neck Lymph Nodes] : normal neck lymph nodes  [Normal Supraclavicular Lymph Nodes] : normal supraclavicular lymph nodes [Normal Axillary Lymph Nodes] : normal axillary lymph nodes [Normal Groin Lymph Nodes] : normal groin lymph nodes [Normal] : grossly intact [de-identified] : obese

## 2024-04-02 NOTE — HISTORY OF PRESENT ILLNESS
[de-identified] : Mr. Isaacs is a 63 y/o male who developed blood per rectum and altered bowel habits/incomplete evacuation 2 months ago. He underwent his first colonoscopy with Dr. Phuc Calle 03/28/2024 which showed a fungating/infiltrating/partially obstructing mass at the splenic flexure occupying 80% of lumen concerning for malignancy with a separate polyp just distal to mass.  A separate sigmoid colon polyp at 30 cm was removed. He denies nausea/emesis but does report intermittent left abdominal pain. He reports feeling anxious and weak. He has not had imaging or blood work for workup. He denies history of colorectal cancer in the family.

## 2024-04-03 LAB
ALBUMIN SERPL ELPH-MCNC: 4.3 G/DL
ALP BLD-CCNC: 75 U/L
ALT SERPL-CCNC: 18 U/L
ANION GAP SERPL CALC-SCNC: 14 MMOL/L
AST SERPL-CCNC: 21 U/L
BILIRUB SERPL-MCNC: 0.2 MG/DL
BUN SERPL-MCNC: 28 MG/DL
CALCIUM SERPL-MCNC: 10 MG/DL
CEA SERPL-MCNC: 1.8 NG/ML
CHLORIDE SERPL-SCNC: 102 MMOL/L
CO2 SERPL-SCNC: 26 MMOL/L
CREAT SERPL-MCNC: 1.63 MG/DL
EGFR: 47 ML/MIN/1.73M2
GLUCOSE SERPL-MCNC: 93 MG/DL
HCT VFR BLD CALC: 35.7 %
HGB BLD-MCNC: 11 G/DL
MCHC RBC-ENTMCNC: 24.8 PG
MCHC RBC-ENTMCNC: 30.8 GM/DL
MCV RBC AUTO: 80.4 FL
PLATELET # BLD AUTO: 329 K/UL
PMV BLD AUTO: 0 /100 WBCS
POTASSIUM SERPL-SCNC: 3.8 MMOL/L
PROT SERPL-MCNC: 6.9 G/DL
RBC # BLD: 4.44 M/UL
RBC # FLD: 14 %
SODIUM SERPL-SCNC: 142 MMOL/L
WBC # FLD AUTO: 8.57 K/UL

## 2024-04-04 RX ORDER — POTASSIUM CHLORIDE 1500 MG/1
20 TABLET, FILM COATED, EXTENDED RELEASE ORAL
Qty: 4 | Refills: 0 | Status: ACTIVE | COMMUNITY
Start: 2024-04-04 | End: 1900-01-01

## 2024-04-04 RX ORDER — NEOMYCIN SULFATE 500 MG/1
500 TABLET ORAL
Qty: 6 | Refills: 0 | Status: ACTIVE | COMMUNITY
Start: 2024-04-04 | End: 1900-01-01

## 2024-04-04 RX ORDER — METRONIDAZOLE 250 MG/1
250 TABLET ORAL
Qty: 3 | Refills: 0 | Status: ACTIVE | COMMUNITY
Start: 2024-04-04 | End: 1900-01-01

## 2024-04-05 ENCOUNTER — RESULT REVIEW (OUTPATIENT)
Age: 64
End: 2024-04-05

## 2024-04-05 ENCOUNTER — OUTPATIENT (OUTPATIENT)
Dept: OUTPATIENT SERVICES | Facility: HOSPITAL | Age: 64
LOS: 1 days | End: 2024-04-05

## 2024-04-05 VITALS
HEIGHT: 64 IN | HEART RATE: 95 BPM | DIASTOLIC BLOOD PRESSURE: 86 MMHG | TEMPERATURE: 98 F | OXYGEN SATURATION: 100 % | RESPIRATION RATE: 14 BRPM | WEIGHT: 203.93 LBS | SYSTOLIC BLOOD PRESSURE: 125 MMHG

## 2024-04-05 DIAGNOSIS — C18.5 MALIGNANT NEOPLASM OF SPLENIC FLEXURE: ICD-10-CM

## 2024-04-05 DIAGNOSIS — Z98.890 OTHER SPECIFIED POSTPROCEDURAL STATES: Chronic | ICD-10-CM

## 2024-04-05 DIAGNOSIS — C18.9 MALIGNANT NEOPLASM OF COLON, UNSPECIFIED: ICD-10-CM

## 2024-04-05 LAB
A1C WITH ESTIMATED AVERAGE GLUCOSE RESULT: 5.3 % — SIGNIFICANT CHANGE UP (ref 4–5.6)
BLD GP AB SCN SERPL QL: NEGATIVE — SIGNIFICANT CHANGE UP
ESTIMATED AVERAGE GLUCOSE: 105 — SIGNIFICANT CHANGE UP
RH IG SCN BLD-IMP: POSITIVE — SIGNIFICANT CHANGE UP
RH IG SCN BLD-IMP: POSITIVE — SIGNIFICANT CHANGE UP

## 2024-04-05 RX ORDER — SODIUM CHLORIDE 9 MG/ML
1000 INJECTION, SOLUTION INTRAVENOUS
Refills: 0 | Status: DISCONTINUED | OUTPATIENT
Start: 2024-04-12 | End: 2024-04-13

## 2024-04-05 NOTE — H&P PST ADULT - PROBLEM SELECTOR PLAN 1
Pt scheduled for robotic partial colectomy on 4/12/2024.  labs cbc, cmp done 4/2/2024 allscripts results in chart, ekg in chart.  chlorhexidine provided-  written and verbal instructions given, with teach back, pt able to verbalize understanding.  Preop teaching done, pt able to verbalize understanding.   medication day of procedure-  amlodipine, losartan, metoprolol, pepcid   aesthesia-  SHAMAR precautions  pst request   echo 2023 [phone]  stress 2023 [phone]

## 2024-04-05 NOTE — H&P PST ADULT - NSICDXPASTMEDICALHX_GEN_ALL_CORE_FT
PAST MEDICAL HISTORY:  Anxiety     Hypertension      PAST MEDICAL HISTORY:  Anxiety     History of BPH     Hyperlipidemia     Hypertension     Malignant neoplasm of colon

## 2024-04-05 NOTE — H&P PST ADULT - NEGATIVE CARDIOVASCULAR SYMPTOMS
no chest pain/no palpitations/no dyspnea on exertion/no orthopnea/no paroxysmal nocturnal dyspnea/no peripheral edema/no claudication No

## 2024-04-05 NOTE — H&P PST ADULT - GASTROINTESTINAL
details… soft/nontender/nondistended/normal active bowel sounds/no guarding/no rigidity/no organomegaly/no palpable marija/no masses palpable

## 2024-04-05 NOTE — H&P PST ADULT - HISTORY OF PRESENT ILLNESS
63y/o male scheduled for robotic partial colectomy on 4/12/2024.  Pt states, "developed constipation 1/2024, at the end of the month developed abdominal pain and rectal bleeding, underwent colonoscopy identified a cancerous tumor in the colon.  No more complaints of abdominal pain and rectal bleeding." 63y/o male scheduled for robotic partial colectomy on 4/12/2024.  Pt states, "c/o constipation 1/2024, at the end of the month developed abdominal pain and rectal bleeding, underwent colonoscopy identified a cancerous tumor in the colon.  No further complaints of abdominal pain and rectal bleeding."

## 2024-04-05 NOTE — H&P PST ADULT - NSICDXPASTSURGICALHX_GEN_ALL_CORE_FT
PAST SURGICAL HISTORY:  No significant past surgical history      PAST SURGICAL HISTORY:  S/P arthroscopy of right knee

## 2024-04-09 ENCOUNTER — OUTPATIENT (OUTPATIENT)
Dept: OUTPATIENT SERVICES | Facility: HOSPITAL | Age: 64
LOS: 1 days | End: 2024-04-09
Payer: MEDICARE

## 2024-04-09 ENCOUNTER — APPOINTMENT (OUTPATIENT)
Dept: CT IMAGING | Facility: IMAGING CENTER | Age: 64
End: 2024-04-09
Payer: MEDICARE

## 2024-04-09 DIAGNOSIS — C18.5 MALIGNANT NEOPLASM OF SPLENIC FLEXURE: ICD-10-CM

## 2024-04-09 DIAGNOSIS — Z98.890 OTHER SPECIFIED POSTPROCEDURAL STATES: Chronic | ICD-10-CM

## 2024-04-09 PROBLEM — Z87.438 PERSONAL HISTORY OF OTHER DISEASES OF MALE GENITAL ORGANS: Chronic | Status: ACTIVE | Noted: 2024-04-05

## 2024-04-09 PROBLEM — C18.9 MALIGNANT NEOPLASM OF COLON, UNSPECIFIED: Chronic | Status: ACTIVE | Noted: 2024-04-05

## 2024-04-09 PROBLEM — E78.5 HYPERLIPIDEMIA, UNSPECIFIED: Chronic | Status: ACTIVE | Noted: 2024-04-05

## 2024-04-09 PROCEDURE — 74177 CT ABD & PELVIS W/CONTRAST: CPT

## 2024-04-09 PROCEDURE — 74177 CT ABD & PELVIS W/CONTRAST: CPT | Mod: 26

## 2024-04-09 PROCEDURE — 71260 CT THORAX DX C+: CPT

## 2024-04-09 PROCEDURE — 71260 CT THORAX DX C+: CPT | Mod: 26

## 2024-04-11 ENCOUNTER — TRANSCRIPTION ENCOUNTER (OUTPATIENT)
Age: 64
End: 2024-04-11

## 2024-04-11 NOTE — ASU PATIENT PROFILE, ADULT - NSICDXPASTMEDICALHX_GEN_ALL_CORE_FT
PAST MEDICAL HISTORY:  Anxiety     History of BPH     Hyperlipidemia     Hypertension     Malignant neoplasm of colon

## 2024-04-12 ENCOUNTER — INPATIENT (INPATIENT)
Facility: HOSPITAL | Age: 64
LOS: 5 days | Discharge: HOME | End: 2024-04-18
Attending: SURGERY | Admitting: SURGERY
Payer: MEDICARE

## 2024-04-12 ENCOUNTER — NON-APPOINTMENT (OUTPATIENT)
Age: 64
End: 2024-04-12

## 2024-04-12 ENCOUNTER — RESULT REVIEW (OUTPATIENT)
Age: 64
End: 2024-04-12

## 2024-04-12 ENCOUNTER — APPOINTMENT (OUTPATIENT)
Dept: SURGICAL ONCOLOGY | Facility: HOSPITAL | Age: 64
End: 2024-04-12

## 2024-04-12 VITALS
DIASTOLIC BLOOD PRESSURE: 92 MMHG | SYSTOLIC BLOOD PRESSURE: 132 MMHG | TEMPERATURE: 98 F | OXYGEN SATURATION: 99 % | HEART RATE: 96 BPM | WEIGHT: 203.93 LBS | RESPIRATION RATE: 18 BRPM | HEIGHT: 64 IN

## 2024-04-12 DIAGNOSIS — Z98.890 OTHER SPECIFIED POSTPROCEDURAL STATES: Chronic | ICD-10-CM

## 2024-04-12 DIAGNOSIS — C18.5 MALIGNANT NEOPLASM OF SPLENIC FLEXURE: ICD-10-CM

## 2024-04-12 LAB — GLUCOSE BLDC GLUCOMTR-MCNC: 107 MG/DL — HIGH (ref 70–99)

## 2024-04-12 PROCEDURE — 88342 IMHCHEM/IMCYTCHM 1ST ANTB: CPT | Mod: 26

## 2024-04-12 PROCEDURE — 88309 TISSUE EXAM BY PATHOLOGIST: CPT | Mod: 26

## 2024-04-12 PROCEDURE — 88341 IMHCHEM/IMCYTCHM EA ADD ANTB: CPT | Mod: 26

## 2024-04-12 PROCEDURE — 44204 LAPARO PARTIAL COLECTOMY: CPT

## 2024-04-12 PROCEDURE — 44213 LAP MOBIL SPLENIC FL ADD-ON: CPT

## 2024-04-12 PROCEDURE — 49905 OMENTAL FLAP INTRA-ABDOM: CPT

## 2024-04-12 PROCEDURE — 88305 TISSUE EXAM BY PATHOLOGIST: CPT | Mod: 26

## 2024-04-12 PROCEDURE — S2900 ROBOTIC SURGICAL SYSTEM: CPT | Mod: NC

## 2024-04-12 DEVICE — LIGATING CLIPS WECK HEMOLOK POLYMER LARGE (PURPLE) 6: Type: IMPLANTABLE DEVICE | Status: FUNCTIONAL

## 2024-04-12 DEVICE — STAPLER COVIDIEN TRI-STAPLE 60MM PURPLE INTELLIGENT RELOAD: Type: IMPLANTABLE DEVICE | Status: FUNCTIONAL

## 2024-04-12 DEVICE — SURGICEL 2 X 14": Type: IMPLANTABLE DEVICE | Status: FUNCTIONAL

## 2024-04-12 RX ORDER — NORTRIPTYLINE HYDROCHLORIDE 10 MG/1
10 CAPSULE ORAL DAILY
Refills: 0 | Status: DISCONTINUED | OUTPATIENT
Start: 2024-04-12 | End: 2024-04-18

## 2024-04-12 RX ORDER — OXYCODONE HYDROCHLORIDE 5 MG/1
5 TABLET ORAL ONCE
Refills: 0 | Status: DISCONTINUED | OUTPATIENT
Start: 2024-04-12 | End: 2024-04-12

## 2024-04-12 RX ORDER — HYDROMORPHONE HYDROCHLORIDE 2 MG/ML
1 INJECTION INTRAMUSCULAR; INTRAVENOUS; SUBCUTANEOUS
Refills: 0 | Status: DISCONTINUED | OUTPATIENT
Start: 2024-04-12 | End: 2024-04-12

## 2024-04-12 RX ORDER — PANTOPRAZOLE SODIUM 20 MG/1
40 TABLET, DELAYED RELEASE ORAL
Refills: 0 | Status: DISCONTINUED | OUTPATIENT
Start: 2024-04-12 | End: 2024-04-18

## 2024-04-12 RX ORDER — ACETAMINOPHEN 500 MG
1000 TABLET ORAL ONCE
Refills: 0 | Status: COMPLETED | OUTPATIENT
Start: 2024-04-12 | End: 2024-04-12

## 2024-04-12 RX ORDER — FENOFIBRATE,MICRONIZED 130 MG
1 CAPSULE ORAL
Refills: 0 | DISCHARGE

## 2024-04-12 RX ORDER — HEPARIN SODIUM 5000 [USP'U]/ML
5000 INJECTION INTRAVENOUS; SUBCUTANEOUS EVERY 8 HOURS
Refills: 0 | Status: DISCONTINUED | OUTPATIENT
Start: 2024-04-12 | End: 2024-04-18

## 2024-04-12 RX ORDER — METOPROLOL TARTRATE 50 MG
1 TABLET ORAL
Refills: 0 | DISCHARGE

## 2024-04-12 RX ORDER — OXYCODONE HYDROCHLORIDE 5 MG/1
5 TABLET ORAL EVERY 4 HOURS
Refills: 0 | Status: DISCONTINUED | OUTPATIENT
Start: 2024-04-12 | End: 2024-04-18

## 2024-04-12 RX ORDER — HYDROMORPHONE HYDROCHLORIDE 2 MG/ML
0.5 INJECTION INTRAMUSCULAR; INTRAVENOUS; SUBCUTANEOUS
Refills: 0 | Status: DISCONTINUED | OUTPATIENT
Start: 2024-04-12 | End: 2024-04-12

## 2024-04-12 RX ORDER — SIMVASTATIN 20 MG/1
1 TABLET, FILM COATED ORAL
Refills: 0 | DISCHARGE

## 2024-04-12 RX ORDER — METOPROLOL TARTRATE 50 MG
25 TABLET ORAL DAILY
Refills: 0 | Status: DISCONTINUED | OUTPATIENT
Start: 2024-04-12 | End: 2024-04-18

## 2024-04-12 RX ORDER — AMLODIPINE BESYLATE 2.5 MG/1
10 TABLET ORAL DAILY
Refills: 0 | Status: DISCONTINUED | OUTPATIENT
Start: 2024-04-12 | End: 2024-04-18

## 2024-04-12 RX ORDER — ONDANSETRON 8 MG/1
4 TABLET, FILM COATED ORAL ONCE
Refills: 0 | Status: DISCONTINUED | OUTPATIENT
Start: 2024-04-12 | End: 2024-04-12

## 2024-04-12 RX ORDER — LOSARTAN POTASSIUM 100 MG/1
1 TABLET, FILM COATED ORAL
Refills: 0 | DISCHARGE

## 2024-04-12 RX ORDER — OXYCODONE HYDROCHLORIDE 5 MG/1
2.5 TABLET ORAL EVERY 4 HOURS
Refills: 0 | Status: DISCONTINUED | OUTPATIENT
Start: 2024-04-12 | End: 2024-04-18

## 2024-04-12 RX ORDER — INFLUENZA VIRUS VACCINE 15; 15; 15; 15 UG/.5ML; UG/.5ML; UG/.5ML; UG/.5ML
0.5 SUSPENSION INTRAMUSCULAR ONCE
Refills: 0 | Status: DISCONTINUED | OUTPATIENT
Start: 2024-04-12 | End: 2024-04-18

## 2024-04-12 RX ADMIN — Medication 400 MILLIGRAM(S): at 21:54

## 2024-04-12 RX ADMIN — OXYCODONE HYDROCHLORIDE 5 MILLIGRAM(S): 5 TABLET ORAL at 17:25

## 2024-04-12 RX ADMIN — HYDROMORPHONE HYDROCHLORIDE 0.5 MILLIGRAM(S): 2 INJECTION INTRAMUSCULAR; INTRAVENOUS; SUBCUTANEOUS at 15:50

## 2024-04-12 RX ADMIN — HEPARIN SODIUM 5000 UNIT(S): 5000 INJECTION INTRAVENOUS; SUBCUTANEOUS at 21:54

## 2024-04-12 RX ADMIN — HYDROMORPHONE HYDROCHLORIDE 0.5 MILLIGRAM(S): 2 INJECTION INTRAMUSCULAR; INTRAVENOUS; SUBCUTANEOUS at 16:00

## 2024-04-12 RX ADMIN — Medication 1000 MILLIGRAM(S): at 23:00

## 2024-04-12 RX ADMIN — OXYCODONE HYDROCHLORIDE 5 MILLIGRAM(S): 5 TABLET ORAL at 18:45

## 2024-04-12 RX ADMIN — SODIUM CHLORIDE 30 MILLILITER(S): 9 INJECTION, SOLUTION INTRAVENOUS at 18:45

## 2024-04-12 NOTE — CONSULT NOTE ADULT - NS ATTEND AMEND GEN_ALL_CORE FT
doing well s/p colorectal surgery.  no changes made to current management plan.  on surveillance for postop MI/PE.

## 2024-04-12 NOTE — CONSULT NOTE ADULT - SUBJECTIVE AND OBJECTIVE BOX
date of consult 4/12/24    HISTORY OF PRESENT ILLNESS: HPI:    64 year old male PMH HTN, HLD, CKD and anxiety, with last NST 11/2023 with no ischemia or infarct and last TTE 11/2023 with Normal LV/RV function, who was recently dx with colon cancer now s/p left hemicolectomy.  Seen in pacu, resting but arousable, ros limited.      PAST MEDICAL & SURGICAL HISTORY:  Hypertension      Anxiety      Hyperlipidemia      Malignant neoplasm of colon      History of BPH      S/P arthroscopy of right knee      INPT MEDICATIONS  (STANDING):  acetaminophen   IVPB .. 1000 milliGRAM(s) IV Intermittent once  amLODIPine   Tablet 10 milliGRAM(s) Oral daily  heparin   Injectable 5000 Unit(s) SubCutaneous every 8 hours  lactated ringers. 1000 milliLiter(s) (30 mL/Hr) IV Continuous <Continuous>  metoprolol succinate ER 25 milliGRAM(s) Oral daily  nortriptyline 10 milliGRAM(s) Oral daily  pantoprazole    Tablet 40 milliGRAM(s) Oral before breakfast    HOME meds:  Tricor 48 mg tablet 1 TABLET DAILY  Vitamin D2 1,250 mcg (50,000 unit) capsule 1 CAPSULE Q1WK  allopurinol 100 mg tablet 1 TABLET DAILY  amlodipine 10 mg tablet 1 TABLET DAILY  chlorthalidone 25 mg tablet 1 TABLET DAILY  losartan 50 mg tablet 1 TABLET DAILY  metoprolol succinate ER 25 mg tablet,extended release 24 hr 1 TABLET DAILY  nortriptyline 10 mg capsule 1 CAPSULE DAILY  simvastatin 20 mg tablet 1 TABLET DAILY        Allergies  No Known Allergies        FAMILY HISTORY:  FHx: coronary artery disease  Noncontributory for premature coronary disease or sudden cardiac death    SOCIAL HISTORY:    [x ] Non-smoker  [ ] Smoker  [ ] Alcohol    FLU VACCINE THIS YEAR STARTS IN AUGUST:  [ ] Yes    [ ] No    IF OVER 65 HAVE YOU EVER HAD A PNA VACCINE:  [ ] Yes    [ ] No       [ ] N/A      REVIEW OF SYSTEMS:  [ ]chest pain  [  ]shortness of breath  [  ]palpitations  [  ]syncope  [ ]near syncope [ ]upper extremity weakness   [ ] lower extremity weakness  [  ]diplopia  [  ]altered mental status   [  ]fevers  [ ]chills [ ]nausea  [ ]vomiting  [  ]dysphagia    [ ]abdominal pain  [ ]melena  [ ]BRBPR    [  ]epistaxis  [  ]rash    [ ]lower extremity edema        [x ] All others negative	  [ ] Unable to obtain      LABS: pending	 	    PHYSICAL EXAM:  T(C): 36.9 (04-12-24 @ 15:30), Max: 36.9 (04-12-24 @ 15:30)  HR: 89 (04-12-24 @ 15:45) (74 - 96)  BP: 133/80 (04-12-24 @ 15:45) (132/92 - 157/92)  RR: 14 (04-12-24 @ 15:45) (12 - 18)  SpO2: 98% (04-12-24 @ 15:45) (96% - 99%)  Wt(kg): --   BMI (kg/m2): 35 (04-12-24 @ 05:54)    Gen: Appearscomfortable  HEENT:  (-)icterus (-)pallor  CV: N S1 S2 1/6 DONALDO (+)2 Pulses B/l  Resp:  Clear to ausculatation B/L, normal effort  GI: deferred  Lymph:  (-)Edema  Skin: Warm to touch, Normal turgor  Psych: unable to fully eval      ASSESSMENT/PLAN: 	  64 year old male PMH HTN, HLD, CKD (last creatinine 1.74 on 3/8/24) and anxiety, with last NST 11/2023 with no ischemia or infarct and last TTE 11/2023 with Normal LV/RV function, who was recently dx with colon cancer now s/p left hemicolectomy.     --tolerated procedure well from CV perspective  --will follow post op to assist with BP management, agree with only resuming 1-2 meds now and slowly resuming home meds as tolerated  --post op care per surgery    Thank you for allowing us to participate in the care of our mutual patient.  Please do not hesitate to call with any questions.     [place] [facility]  [phone]                date of consult 4/12/24    HISTORY OF PRESENT ILLNESS: HPI:    64 year old male PMH HTN, HLD, CKD and anxiety, with last NST 11/2023 with no ischemia or infarct and last TTE 11/2023 with Normal LV/RV function, who was recently dx with colon cancer now s/p left hemicolectomy.  Seen in pacu, resting but arousable, ros limited.      PAST MEDICAL & SURGICAL HISTORY:  Hypertension  Anxiety  Hyperlipidemia  Malignant neoplasm of colon  History of BPH  S/P arthroscopy of right knee    INPT MEDICATIONS  (STANDING):  acetaminophen   IVPB .. 1000 milliGRAM(s) IV Intermittent once  amLODIPine   Tablet 10 milliGRAM(s) Oral daily  heparin   Injectable 5000 Unit(s) SubCutaneous every 8 hours  lactated ringers. 1000 milliLiter(s) (30 mL/Hr) IV Continuous <Continuous>  metoprolol succinate ER 25 milliGRAM(s) Oral daily  nortriptyline 10 milliGRAM(s) Oral daily  pantoprazole    Tablet 40 milliGRAM(s) Oral before breakfast    HOME meds:  Tricor 48 mg tablet 1 TABLET DAILY  Vitamin D2 1,250 mcg (50,000 unit) capsule 1 CAPSULE Q1WK  allopurinol 100 mg tablet 1 TABLET DAILY  amlodipine 10 mg tablet 1 TABLET DAILY  chlorthalidone 25 mg tablet 1 TABLET DAILY  losartan 50 mg tablet 1 TABLET DAILY  metoprolol succinate ER 25 mg tablet,extended release 24 hr 1 TABLET DAILY  nortriptyline 10 mg capsule 1 CAPSULE DAILY  simvastatin 20 mg tablet 1 TABLET DAILY        Allergies  No Known Allergies        FAMILY HISTORY:  FHx: coronary artery disease  Noncontributory for premature coronary disease or sudden cardiac death    SOCIAL HISTORY:    [x ] Non-smoker  [ ] Smoker  [ ] Alcohol    FLU VACCINE THIS YEAR STARTS IN AUGUST:  [ ] Yes    [ ] No    IF OVER 65 HAVE YOU EVER HAD A PNA VACCINE:  [ ] Yes    [ ] No       [ ] N/A      REVIEW OF SYSTEMS:  [ ]chest pain  [  ]shortness of breath  [  ]palpitations  [  ]syncope  [ ]near syncope [ ]upper extremity weakness   [ ] lower extremity weakness  [  ]diplopia  [  ]altered mental status   [  ]fevers  [ ]chills [ ]nausea  [ ]vomiting  [  ]dysphagia    [ ]abdominal pain  [ ]melena  [ ]BRBPR    [  ]epistaxis  [  ]rash    [ ]lower extremity edema        [x ] All others negative	  [ ] Unable to obtain      LABS: pending	 	    PHYSICAL EXAM:  T(C): 36.9 (04-12-24 @ 15:30), Max: 36.9 (04-12-24 @ 15:30)  HR: 89 (04-12-24 @ 15:45) (74 - 96)  BP: 133/80 (04-12-24 @ 15:45) (132/92 - 157/92)  RR: 14 (04-12-24 @ 15:45) (12 - 18)  SpO2: 98% (04-12-24 @ 15:45) (96% - 99%)  Wt(kg): --   BMI (kg/m2): 35 (04-12-24 @ 05:54)    Gen: Appearscomfortable  HEENT:  (-)icterus (-)pallor  CV: N S1 S2 1/6 DONALDO (+)2 Pulses B/l  Resp:  Clear to ausculatation B/L, normal effort  GI: deferred  Lymph:  (-)Edema  Skin: Warm to touch, Normal turgor  Psych: unable to fully eval      ASSESSMENT/PLAN: 	  64 year old male PMH HTN, HLD, CKD (last creatinine 1.74 on 3/8/24) and anxiety, with last NST 11/2023 with no ischemia or infarct and last TTE 11/2023 with Normal LV/RV function, who was recently dx with colon cancer now s/p left hemicolectomy.     --tolerated procedure well from CV perspective  --will follow post op to assist with BP management, agree with only resuming 1-2 meds now and slowly resuming home meds as tolerated  --post op care per surgery    Thank you for allowing us to participate in the care of our mutual patient.  Please do not hesitate to call with any questions.     [place] [facility]  [phone]

## 2024-04-12 NOTE — PATIENT PROFILE ADULT - FALL HARM RISK - FACTORS NURSING JUDGEMENT
[facility]    Chief Complaint    No chief complaint on file.      History of Present Illness      Guillermo Chaparro is a 81 y.o. male with chronic conditions of hypertension, hyperlipidemia, chronic kidney disease, rheumatoid arthritis, and gout who presents today for three-month follow-up.     HPI    Past Medical History:  Past Medical History:   Diagnosis Date    Arthritis     Chronic kidney disease     Hyperlipidemia     Hypertension        Past Surgical History:   has a past surgical history that includes Fracture surgery and Cardiac pacemaker placement (2022).    Social History:  Social History     Tobacco Use    Smoking status: Former    Smokeless tobacco: Never   Substance Use Topics    Alcohol use: Not Currently    Drug use: Never       I personally reviewed all past medical, surgical, and social.     Review of Systems   Constitutional:  Negative for chills and fever.   HENT:  Negative for ear pain and sore throat.    Eyes:  Negative for blurred vision.   Respiratory:  Negative for cough and shortness of breath.    Cardiovascular:  Negative for chest pain and palpitations.   Gastrointestinal:  Negative for abdominal pain and constipation.   Genitourinary:  Negative for dysuria and hematuria.   Musculoskeletal:  Positive for joint pain. Negative for back pain and falls.   Skin:  Negative for itching and rash.   Neurological:  Negative for weakness and headaches.   Endo/Heme/Allergies:  Negative for polydipsia. Does not bruise/bleed easily.   Psychiatric/Behavioral:  Negative for suicidal ideas. The patient does not have insomnia.       Medications:  Outpatient Encounter Medications as of 10/25/2022   Medication Sig Dispense Refill    diclofenac sodium (VOLTAREN) 1 % Gel 2 grams of gel      etanercept (ENBREL SURECLICK) 50 mg/mL (1 mL) 1 ml      etanercept (ENBREL) 50 mg/mL (1 mL) injection 1 mL.      predniSONE (DELTASONE) 5 MG tablet TAKE 1 OR 2 TABLETS BY MOUTH DAILY WITH FOOD      [DISCONTINUED]  "amLODIPine (NORVASC) 5 MG tablet Take 1 tablet (5 mg total) by mouth once daily. 90 tablet 1    [DISCONTINUED] hydroCHLOROthiazide (HYDRODIURIL) 12.5 MG Tab TAKE 1 TABLET BY MOUTH EVERY DAY 90 tablet 1    [DISCONTINUED] oxyCODONE-acetaminophen (PERCOCET) 7.5-325 mg per tablet Take 1 tablet by mouth every 12 (twelve) hours as needed for Pain. 60 tablet 0    [DISCONTINUED] potassium chloride SA (K-DUR,KLOR-CON) 20 MEQ tablet TAKE 1 TABLET BY MOUTH DAILY WITH A MEAL 90 tablet 1    amLODIPine (NORVASC) 5 MG tablet Take 1 tablet (5 mg total) by mouth once daily. 90 tablet 1    hydroCHLOROthiazide (HYDRODIURIL) 12.5 MG Tab Take 1 tablet (12.5 mg total) by mouth once daily. 90 tablet 1    oxyCODONE-acetaminophen (PERCOCET) 7.5-325 mg per tablet Take 1 tablet by mouth every 12 (twelve) hours as needed for Pain. 60 tablet 0    potassium chloride SA (K-DUR,KLOR-CON) 20 MEQ tablet TAKE 1 TABLET BY MOUTH DAILY WITH A MEAL 90 tablet 1     No facility-administered encounter medications on file as of 10/25/2022.       Allergies:  Review of patient's allergies indicates:   Allergen Reactions    Mushroom Other (See Comments)     Burning feeling       Health Maintenance:  Immunization History   Administered Date(s) Administered    COVID-19 MRNA, LN-S PF (MODERNA HALF 0.25 ML DOSE) 11/18/2021, 07/21/2022    COVID-19, MRNA, LN-S, PF (MODERNA FULL 0.5 ML DOSE) 02/27/2021, 03/31/2021, 11/18/2021      Health Maintenance   Topic Date Due    TETANUS VACCINE  07/27/2023 (Originally 1/3/1959)    Lipid Panel  01/13/2027    Hepatitis C Screening  Discontinued        Physical Exam      Vital Signs  Temp: 98.3 °F (36.8 °C)  Pulse: 72  Resp: 16  SpO2: 96 %  BP: 138/80  Height and Weight  Height: 5' 9" (175.3 cm)  Weight: 94.8 kg (209 lb)  BSA (Calculated - sq m): 2.15 sq meters  BMI (Calculated): 30.8  Weight in (lb) to have BMI = 25: 168.9]    Physical Exam  Vitals and nursing note reviewed.   Constitutional:       Appearance: Normal appearance. "   HENT:      Head: Normocephalic and atraumatic.      Nose: Nose normal.      Mouth/Throat:      Mouth: Mucous membranes are moist.      Pharynx: Oropharynx is clear.   Eyes:      Pupils: Pupils are equal, round, and reactive to light.   Cardiovascular:      Rate and Rhythm: Normal rate and regular rhythm.      Heart sounds: Normal heart sounds.   Pulmonary:      Effort: Pulmonary effort is normal.      Breath sounds: Normal breath sounds.   Musculoskeletal:      Right lower leg: No edema.      Left lower leg: No edema.   Skin:     General: Skin is warm.      Findings: No rash.   Neurological:      General: No focal deficit present.      Mental Status: He is alert and oriented to person, place, and time. Mental status is at baseline.      Gait: Gait normal.   Psychiatric:         Mood and Affect: Mood normal.         Behavior: Behavior normal.         Thought Content: Thought content normal.         Judgment: Judgment normal.        Laboratory:  CBC:      CMP:  Recent Labs   Lab 04/27/22  1151   Glucose 103   Calcium 9.1   Sodium 139   Potassium 3.6   CO2 26   Chloride 106   BUN 20 H     LIPIDS:  Recent Labs   Lab 01/13/22  0934   HDL Cholesterol 38 L   Cholesterol 176   Triglycerides 253 H   LDL Calculated 87   Cholesterol/HDL Ratio (Risk Factor) 4.6   Non-     TSH:      A1C:        Assessment/Plan     Guillermo Chaparro is a 81 y.o.male with:    1. Essential hypertension  - amLODIPine (NORVASC) 5 MG tablet; Take 1 tablet (5 mg total) by mouth once daily.  Dispense: 90 tablet; Refill: 1  - hydroCHLOROthiazide (HYDRODIURIL) 12.5 MG Tab; Take 1 tablet (12.5 mg total) by mouth once daily.  Dispense: 90 tablet; Refill: 1  - Basic Metabolic Panel; Future  - Basic Metabolic Panel    2. Arthritis  - oxyCODONE-acetaminophen (PERCOCET) 7.5-325 mg per tablet; Take 1 tablet by mouth every 12 (twelve) hours as needed for Pain.  Dispense: 60 tablet; Refill: 0    3. Hypokalemia  - potassium chloride SA (K-DUR,KLOR-CON) 20 MEQ  tablet; TAKE 1 TABLET BY MOUTH DAILY WITH A MEAL  Dispense: 90 tablet; Refill: 1    4. Kidney function test abnormal  - Microalbumin/Creatinine Ratio, Urine; Future  - Microalbumin/Creatinine Ratio, Urine       Chronic conditions status updated as per HPI.  Other than changes above, cont current medications and maintain follow up with specialists.  Return to clinic in 3 months.    Lula Mcclure DO  [facility]                     Yes

## 2024-04-12 NOTE — PATIENT PROFILE ADULT - FUNCTIONAL ASSESSMENT - DAILY ACTIVITY 6.
Name: Ana Ch    MRN: [id number]   YOB: 1987                 Chief Complaint:   Kidney transplant clearance         Assessment and Plan:   36 year old male with ESRD from FS[facility] on HD as well as complex urologic history including severe polypoid cystitis of unclear etiology and bilateral hydroureteronephrosis. Cystoscopy and cystogram with outside urology group showed significant abnormalities including very thick bladder wall, small bladder capacity, and high pressures on minimal bladder filling. Left RPG showed narrowing of a 2-3 cm segment of the distal ureter with severe proximal hydroureteronephrosis. Right RPG could not be performed due to inability to find the right UO secondary to severely thickened and inflamed bladder wall with bullous edema. There has been no evidence for malignancy on repeated bladder biopsies. He was referred to tertiary care center for continued management.   -Plan for video urodynamics, then follow up with Dr. Wynn to review.    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    Brief urologic summary reviewed from [facility] note on 6/22/23 as follows:  # Complex Urologic Issues: with cysto/TURBT April 2023 showing diffuse cystitis, very thickened bladder wall, small bladder capacity, high pressures upon minimal filling, right-sided distal ureteral stenosis with severe proximal hydroureteronephrosis, and abnormal appearance of the right half of the bladder. No evidence of atypia or malignancy. Etiology not known at this time. Will refer to urology here.     TODAY   8/29/2023:  Aan states that urinary symptoms started last November with pain emptying his bladder. He was given a referral to [facility] and was diagnosed with prostatitis. Then had  "office cystoscopy in Jan 2023 which was abnormal. Cysto with TURBT 2/21/23:        Surg path from 2/21/23:      Then on 4/27/23 he underwent cystoscopy, cystogram, left RPG, and bladder biopsies.             Currently, Ana reports mild pain with urination that has improved from prior.   Since starting dialysis in March, his urine output has reduced. However, he stills voids 4-5 times per day but in small volumes. He feels to empty his bladder. No recent gross hematuria.   Reports that he is on Doxycycline 100 mg once daily \"indefinitely.\"          Past Medical History:     Past Medical History:   Diagnosis Date    Anxiety     Bladder tumor     Chronic kidney disease     Depression     Depressive disorder     Exposure to human immunodeficiency virus     Foot pain, bilateral     Gout     Hip pain     HTN (hypertension)     Hypertension     Controled with medication    Obesity     Prostatitis     Renal disease     Urinary retention     Urinary urgency             Past Surgical History:     Past Surgical History:   Procedure Laterality Date    BIOPSY      CYSTOSCOPY, TRANSURETHRAL RESECTION (TUR) TUMOR BLADDER, COMBINED N/A 02/21/2023    Procedure: CYSTOSCOPY AND TRANSURETHRAL RESECTION OF BLADDER TUMORS;  Surgeon: Gaston Villarreal MD;  Location: [facility] [place], [place] OR    REPAIR PTOSIS Bilateral 2007    REPAIR PTOSIS Right 11/17/2021    Procedure: Right upper ptosis repair;  Surgeon: Hair Breen MD;  Location: [facility] [place] OR    REPAIR PTOSIS Right 02/09/2022    Procedure: right upper eyelid ptosis repair;  Surgeon: Hair Breen MD;  Location: [facility] [place] OR            Social History:     Social History     Tobacco Use    Smoking status: Never    Smokeless tobacco: Never   Substance Use Topics    Alcohol use: Yes     Comment: 5/10/23-One drink per week            Family History:     Family History   Problem Relation Age of Onset    Glaucoma No family hx of     Macular Degeneration No family hx of     Kidney Disease No family " "hx of             Allergies:     Allergies   Allergen Reactions    Nifedipine      Needs to have osmotic extended release Procardia XL otherwise gets flushing            Medications:     Current Outpatient Medications   Medication Sig    acetaminophen (TYLENOL) 325 MG tablet Take 325-650 mg by mouth every 6 hours as needed    allopurinol (ZYLOPRIM) 100 MG tablet Take 50 mg by mouth daily    carvedilol (COREG) 6.25 MG tablet Take 1 tablet (6.25 mg) by mouth 2 times daily (with meals)    NIFEdipine ER OSMOTIC (PROCARDIA XL) 30 MG 24 hr tablet Take 3 tablets (90 mg) by mouth daily    sertraline (ZOLOFT) 100 MG tablet Take 100 mg by mouth daily    vitamin D3 (CHOLECALCIFEROL) 50 mcg (2000 units) tablet Take 1 tablet by mouth daily    calcitRIOL (ROCALTROL) 0.25 MCG capsule Take 0.5 mcg by mouth daily    Calcium Acetate 667 MG TABS Take 1-2 tablets by mouth 3 times daily With meals    Melatonin 10 MG TABS tablet Take 10 mg by mouth nightly as needed for sleep (Patient not taking: Reported on 8/29/2023)    omeprazole (PRILOSEC) 40 MG DR capsule 1 CAPSULES TWICE DAILY 30 MINUTES BEFORE BREAKFAST NAD DINNER FOR 4 WEEKS THEN DAILY FOR 2 WEEKS THEN STOP (Patient not taking: Reported on 8/29/2023)    oxyCODONE (ROXICODONE) 5 MG tablet Take 1-2 tablets (5-10 mg) by mouth every 4 hours as needed for moderate to severe pain (Patient not taking: Reported on 6/22/2023)    sodium bicarbonate 650 MG tablet Take 1,950 mg by mouth 3 times daily Take 3 tablets (1950 mg) by mouth every morning, every evening and every night at bedtime.    tamsulosin (FLOMAX) 0.4 MG capsule Take 0.4 mg by mouth daily (Patient not taking: Reported on 6/22/2023)     No current facility-administered medications for this visit.             Review of Systems:    ROS: 14 point ROS neg other than the symptoms noted above in the HPI and PMH.          Physical Exam:   /74   Pulse 87   Ht 1.727 m (5' 8\")   Wt 115.7 kg (255 lb)   BMI 38.77 kg/m    General: " age-appropriate appearing male in NAD.   HEENT: Head AT/NC, EOMI, CN Grossly intact  Resp: no respiratory distress.  : deferred  Rectal exam: deferred  Neuro: grossly intact  Motor: excellent strength throughout  Skin: clear of rashes or ecchymoses.        Labs:      Lab Results   Component Value Date    CULTURE 10,000-50,000 CFU/mL Mixture of urogenital emir 06/22/2023     Color Urine (no units)   Date Value   06/22/2023 Yellow     Appearance Urine (no units)   Date Value   06/22/2023 Slightly Cloudy (A)     Glucose Urine (mg/dL)   Date Value   06/22/2023 Negative     Bilirubin Urine (no units)   Date Value   06/22/2023 Negative     Ketones Urine (mg/dL)   Date Value   06/22/2023 Trace (A)     Specific Gravity Urine (no units)   Date Value   06/22/2023 1.023     pH Urine (no units)   Date Value   06/22/2023 6.0     Protein Albumin Urine (mg/dL)   Date Value   06/22/2023 300 (A)     Urobilinogen Urine (E.U./dL)   Date Value   03/25/2023 0.2     Nitrite Urine (no units)   Date Value   06/22/2023 Negative     Leukocyte Esterase Urine (no units)   Date Value   06/22/2023 Large (A)       Creatinine   Date Value Ref Range Status   06/22/2023 7.37 (H) 0.67 - 1.17 mg/dL Final         Imaging:    CT abdomen/pelvis without contrast   3/9/2023    FINDINGS:   Hazy ground-glass attenuation scattered through the lung bases suggesting   some pulmonary edema. Heart size is normal. No pericardial or pleural   effusion. Atrophy of the native kidneys with hydronephrosis and   hydroureter bilaterally. Thick-walled urinary bladder. Perivesicular   moderately dense inflammation. Peroneal dialysis catheter through the left   periumbilical abdominal wall collaterals in the ventral left lower   peritoneal margin this abuts small bowel loop but there is no surrounding   organized fluid. No obvious kinking of the catheter within the peritoneal   more through the abdominal wall. This may be a recently placed catheter is   there is some  subcutaneous air in hazy and patchy reticular edema and   fluid attenuation in the abdominal wall adipose. No dilated or inflamed   large or small bowel.     IMPRESSION:   No obvious CT evidence to account for catheter dysfunction. No significant   fluid in the peritoneum. Mild pulmonary edema.       Outside records:    I spent 20 minutes reviewing outside records.       45 minutes spent on the date of the encounter doing chart review, review of outside records, review of test results, interpretation of tests, patient visit, and documentation     4 = No assist / stand by assistance

## 2024-04-12 NOTE — BRIEF OPERATIVE NOTE - NSICDXBRIEFPREOP_GEN_ALL_CORE_FT
Take Tylenol as needed and as directed for discomfort. Warm compress may be helpful or heating pad. Take doxycycline as directed until gone. Follow-up with OB for further evaluation and treatment. Return to the emergency department promptly as needed for new or worsening symptoms.    Ovarian Cysts    Ovarian cysts are sacs filled with fluid or tissue that form on or inside the ovaries. The ovaries are two small organs located on each side of a woman’s uterus (womb). They are part of the female reproductive system.  Ovarian cysts are common in women, especially during childbearing years. There are different types of cysts. Most are harmless (benign) and go away on their own. They often cause no symptoms. If symptoms do occur, they can include mild pain or pressure in the lower belly (abdomen).  Cysts that are large or break (rupture) may cause more severe pain and symptoms. In these cases, hospital care or treatment such as surgery may be needed. More extensive treatment may also be needed if a cyst causes an ovary to twist (called torsion) or if a cyst is suspected to be cancerous. Keep in mind that most cysts are not cancerous, however.  General care  · To help relieve pain, your healthcare provider may recommend using over-the-counter pain medicine. If needed, stronger pain medicine may be prescribed.  · Depending on the type of cyst you have, your healthcare provider may advise taking birth control pills. These help shrink cysts in certain cases. They may also help prevent new cysts from forming. Be sure to take these medicines as directed if they are prescribed.  · Your healthcare provider may advise you to watch your symptoms over time to see if they go away or worsen. Regular ultrasound tests may also be advised. These can help check if a cyst goes away or grows in size.  Follow-up care  Follow up with your healthcare provider, or as advised.  When to seek medical advice  Call your healthcare provider right  away if any of these occur:  · Pain worsens or fails to get better with home treatment  · Fever of 100.4°F (38°C) or higher (or other fever amount directed by your healthcare provider)  · Nausea and vomiting  · Weakness, dizziness, or fainting  · Abnormal vaginal bleeding  © 20004501-4762 [employer]. [address] [place] [address], [place] [address]. All rights reserved. This information is not intended as a substitute for professional medical care. Always follow your healthcare professional's instructions.      Pelvic Inflammatory Disease    Pelvic inflammatory disease (PID) is an infection of the female reproductive organs. These include the vagina, cervix, uterus, fallopian tubes, and ovaries.  PID is a common problem among women. It is most often caused by gonorrhea or chlamydia. These are bacterial infections that are spread through sex. For this reason, they are known as sexually transmitted diseases (STDs). PID can also be caused by other infections, though this is much less common.  When PID is found and treated early, it can often be cured. If not treated promptly, PID can lead to serious health problems. These include chronic pelvic pain and damage to the reproductive organs. PID can also lead to infertility. And it can increase the risk of tubal pregnancy. Mild cases of PID can often be treated at home. Severe cases of PID may need to be treated in the hospital.  Home care  · You will likely be prescribed a combination of antibiotics. Be sure to take the medicines exactly as directed.  · To help relieve pain, you may take over-the-counter pain medicine. Pain medicine may also be prescribed, if needed.  · Inform any partners you’ve had sex with about your condition. They will need to be tested for infection and treated as well.  · Avoid having sex until you and any partners have finished treatment and tests show that you are no longer infected.  Prevention  · If you choose to have sex, make sure to  practice safer sex. For instance, have sex with only one partner who only has sex with you. Ask your partner to be tested for STDs. Each time you have sex, use latex condoms. These help reduce the risk of STDs.  · Avoid douching if advised to by your healthcare provider. Douching may increase the risk of PID.  Follow-up care  Follow up with your healthcare provider, or as advised.  When to seek medical advice  Call your healthcare provider right away if any of these occur:  · Fever of 100.4°F (38°C) or higher, or as directed by your healthcare provider  · Symptoms do not improve after 3 days of treatment  · New or increasing pain in your lower belly or back  · Abnormal vaginal bleeding  · Abnormal vaginal discharge  · Weakness, dizziness or fainting  · Nausea or vomiting  · Trouble with urination or pain and burning during urination  · Rash or joint pain  · Painful open sores around the vagina  · Swollen or painful lymph nodes in the groin (these may be felt as lumps in the groin)  Resources  To learn more about PID and STDs, contact:  The Centers for Disease Control and Prevention National STD Hotline  1-896.637.1418  http://www.cdc.gov/std/  © 7685-7837 [employer]. [address] [place] [address], [place] [address]. All rights reserved. This information is not intended as a substitute for professional medical care. Always follow your healthcare professional's instructions.      MEDICATION: ROCEPHIN[Injection]  ROCEPHIN (generic name: ceftriaxone) is a cephalosporin type of antibiotic used for more serious infections.   DIRECTIONS FOR USE:  If you were instructed to continue this medicine, plans will be made to have you return to this facility or your doctor for additional doses. In some cases a home health care provider can come to your home to give you the medicine. If you were given the IV solution to take home, store it in the refrigerator and remove it one hour before use.   If an IV catheter was left in  your vein, do not remove the dressing. Be careful to protect the area to prevent the tubes from being pulled.   WHAT TO WATCH FOR:  POSSIBLE SIDE EFFECTS with repeated doses of this medicine: Nausea, vomiting, stomach pain, diarrhea --> Contact your doctor if severe or lasts longer than a 24 hours. If you have an IV catheter in place, watch for signs of local infection: tenderness, pain, warmth, redness, red streaks, swelling --> Contact your doctor or return to this facility promptly.  ALLERGIC REACTION: Skin rash, itching, swelling, trouble breathing or swallowing --> Contact your doctor or return to this facility promptly.  ------- IMPORTANT -------  MEDICAL CONDITIONS: Before continuing this drug, be sure your doctor knows if you have any of the following conditions:  -- Allergic reaction to any other cephalosporin (Keflex, Ceftin and others) or any penicillin-type of medicine (Pen Vee K, Amoxicillin, Ampicillin, Dicloxacillin and others)  -- Pregnancy or breast feeding   -- History of colitis, kidney, liver or gall bladder disease  DRUG INTERACTION: Before continuing this drug, be sure your doctor knows if you are taking any of the following:  -- Probenecid (Benemid)  [NOTE: This information topic may not include all directions, precautions, medical conditions, drug/food interactions and warnings for this drug. Check with your doctor, nurse or pharmacist for any questions that you may have.]  © 20007576-5328 [place] [address]. All rights reserved. This information is not intended as a substitute for professional medical care. Always follow your healthcare professional's instructions.      MEDICATION: DOXYCYCLINE  You have been prescribed an antibiotic drug known as Doxycycline (brand name: Vibramycin). It is a form of tetracycline antibiotic used to treat infections.  DIRECTIONS FOR USE:  Doxycycline may be taken with milk or food to prevent upset stomach. Do not take within  1 hour of bedtime. Take the medicine at regular intervals. If the label says “every 12 hours,” this means twice a day. Doses don’t have to be exactly 12 hours apart, but should be spread out evenly twice a day. Take all of the medicine until it is gone, even if you are feeling better. This will ensure the infection is fully treated.  WHAT TO WATCH OUR FOR:  POSSIBLE SIDE EFFECTS: Nausea, vomiting, heartburn, upper abdominal pain, diarrhea (Contact your doctor if any of these symptoms persist or become severe). White spots in the mouth (thrush), vaginal itching or discharge (Contact your doctor).  ALLERGIC REACTION: Rash, itching, swelling, trouble breathing or swallowing (Contact your doctor or return to this facility promptly).  MEDICAL CONDITIONS: Before starting this medicine, be sure your doctor knows if you have any of the following conditions:  · If you are in the last half of pregnancy or are breastfeeding  · Less than 9 years of age  · Reaction to tetracycline-type drugs in the past  · Kidney or liver disease  DRUG INTERACTION: Before starting this medicine, be sure your doctor knows if you are taking any of the following:  · Penicillin-type antibiotic, phenobarbital, birth control pill  · Coumadin (warfarin), Tegretol (carbamazepine)  WARNING:  · Sensitivity to sunlight may develop. Therefore, you should avoid the sun or use sunscreen for the next several weeks.  · Avoid alcohol when taking this medicine.  · This medicine may become harmful when past the expiration date. Throw away any leftover pills.  · Do not take the following medicines 3 hours before or 3 hours after a dose of doxycycline:  ¨ Antacids, laxatives  ¨ Pepto-Bismol, calcium, iron supplements  [NOTE: This information topic may not include all directions, precautions, medical conditions, drug/food interactions and warnings for this drug. Check with your doctor, nurse, or pharmacist for any questions that you may have.]  © 5530-4599 [employer]  [place], [address] [place] [address]. All rights reserved. This information is not intended as a substitute for professional medical care. Always follow your healthcare professional's instructions.          MEDICATION: ACETAMINOPHEN (TYLENOL) (Adult)  Acetaminophen is sold as Tylenol, Tempra, and other brand names. It is useful to treat fever and pain. It has no anti-inflammatory effect.  DIRECTIONS FOR USE:  Take this medicine by mouth with a glass of water. It may be taken with or without food.  It may be taken every 4 hours, up to 4,000 mg a day (12 regular strength tablets or 8 extra strength tablets). Contact your doctor if you feel you need to take this drug at full dosage for more than 10 days.  WHAT TO WATCH FOR:  POSSIBLE SIDE EFFECTS: Liver damage may occur with high doses or prolonged use.  MEDICAL CONDITIONS: Before starting this medicine, be sure your doctor knows if you have any of the following conditions:  · Chronic alcoholism (more than three drinks daily)  · Liver disease  DRUG INTERACTION: Before using this medicine, be sure your doctor knows if you are taking any of the following drugs:  · Barbiturates, Tegretol (carbamazepine), Dilantin (phenytoin), Rifampin  · Coumadin (warfarin)  · Retrovir (AZT or zidovudine)  WARNINGS:  · Before taking other over-the-counter medicines, read their labels to be sure they do not also contain acetaminophen. An overdose can be harmful.  · Acetaminophen is considered safe for short-term use during pregnancy and breastfeeding.  · Excessive use of alcoholic beverages may increase the risk of liver damage when taking this medicine.  Maximum daily dose should be reduced to 2,000 mg if alcohol is used.  [NOTE: This information topic may not include all directions, precautions, medical conditions, drug/food interactions and warnings for this drug. Check with your doctor, nurse, or pharmacist for any questions that you may have.]  © 1616-0992 [employer]  [employer], [address] [place] [address], [place] [address]. All rights reserved. This information is not intended as a substitute for professional medical care. Always follow your healthcare professional's instructions.     PRE-OP DIAGNOSIS:  Colon cancer 12-Apr-2024 14:15:10  Radha Dumont

## 2024-04-12 NOTE — BRIEF OPERATIVE NOTE - OPERATION/FINDINGS
no sign of metastases. after mobilization of splenic flexure and taking the left colonic artery pedicle . the lesser sac was opened. there was significant amount of omentum and intra abdominal fat in the abdomen which makes the case more difficulty. omentectomy also performed to make anastomosis easier . the anastomosis was done hadswen.

## 2024-04-12 NOTE — PATIENT PROFILE ADULT - PUBLIC BENEFITS
"    10/20/2020         RE: Kelley Serra  [address]  [facility] [address]        Dear Colleague,    Thank you for referring your patient, Kelley Serra, to the [facility]. Please see a copy of my visit note below.    Kelley Serra is a 34 year old female who is being evaluated via a billable video visit.      The patient has been notified of following:     \"This video visit will be conducted via a call between you and your physician/provider. We have found that certain health care needs can be provided without the need for an in-person physical exam.  This service lets us provide the care you need with a video conversation.  If a prescription is necessary we can send it directly to your pharmacy.  If lab work is needed we can place an order for that and you can then stop by our lab to have the test done at a later time.    Video visits are billed at different rates depending on your insurance coverage.  Please reach out to your insurance provider with any questions.    If during the course of the call the physician/provider feels a video visit is not appropriate, you will not be charged for this service.\"    Patient has given verbal consent for Video visit? Yes  How would you like to obtain your AVS? MyChart  If you are dropped from the video visit, the video invite should be resent to:   Will anyone else be joining your video visit? No        Video-Visit Details    Type of service:  Video Visit    Video Start Time: 2:28 PM  Video End Time: 2:54 PM    Originating Location (pt. Location): Home    Distant Location (provider location):  [facility]     Platform used for Video Visit: Miller    CC: Abnormal thyroid function tests.     HPI:   Patient presents for evaluation of abnormal thyroid function tests.   She asked to have labs drawn due to concerns about \"brain fog\", dry skin, and fatigue over the last five years.   Over the last 15 months, she has " "noticed a \"tightness\" in her throat.     No recent changes in weight.   Sleeps 8 hours a night. She does not toss or turn.     Occasional palpitations when she is lying down.   No SOB.     Menses are regular. Notes they seeming to last longer recently.   No children. No immediate plans for children.   No dysphagia or changes in speech.     She has a history of stage 1 ovarian cancer.   She has a right oophorectomy.     ROS: 10 point ROS neg other than the symptoms noted above in the HPI.    PMH:   Patient Active Problem List   Diagnosis     Esophageal reflux     Benign neoplasm of skin     CARDIOVASCULAR SCREENING; LDL GOAL LESS THAN 160     Lactose intolerance     Allergy to mold spores     House dust mite allergy     Allergic rhinitis due to animal dander     Diagnostic skin and sensitization tests     Family history of colon cancer     Vitamin D deficiency     JOHN PAUL (generalized anxiety disorder)     Family history of thyroid disease in mother     Meds:  Current Outpatient Medications   Medication     Cholecalciferol (VITAMIN D-3 PO)     fluocinonide (LIDEX) 0.05 % external cream     loratadine (CLARITIN) 10 MG tablet     MULTIPLE VITAMINS PO     No current facility-administered medications for this visit.       FHX:   Mother has hypothyroidism.     SHX:  Non-smoker.   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     Exam:   GENERAL: Healthy, alert and no distress  EYES: Eyes grossly normal to inspection.  No discharge or erythema, or obvious scleral/conjunctival abnormalities.  NECK: No asymmetry, visible masses or scars  RESP: No audible wheeze, cough, or visible cyanosis.  No visible retractions or increased work of breathing.    MS: No gross musculoskeletal defects noted.  Normal range of motion.  No visible edema.  SKIN: Visible skin clear. No significant rash, abnormal pigmentation or lesions.  NEURO: Cranial nerves grossly intact.  Mentation and speech appropriate for age.  PSYCH: Mentation appears normal, affect " normal/bright, judgement and insight intact, normal speech and appearance well-groomed.      A/P:   Abnormal thyroid function tests - Positive TPO antibody. Explained this means she will develop hypothyroidism in the next 10 years. Extensive discussion of thyroid hormone and normal physiology. Included was discussion of thyroid in relation to weight and energy. She is not actively pursuing pregnancy. As per her neck discomfort, this could be due to thyroid enlargement from Hashimoto's thyroiditis or a nodule.   -Schedule a thyroid ultrasound.   -Start levothyroxine 25 mcg daily.   -Labs in 4 weeks.     Devan Mcpherson M.D                Again, thank you for allowing me to participate in the care of your patient.        Sincerely,        Devan Mcpherson MD     no

## 2024-04-13 LAB
ANION GAP SERPL CALC-SCNC: 13 MMOL/L — SIGNIFICANT CHANGE UP (ref 7–14)
BUN SERPL-MCNC: 16 MG/DL — SIGNIFICANT CHANGE UP (ref 7–23)
CALCIUM SERPL-MCNC: 8.7 MG/DL — SIGNIFICANT CHANGE UP (ref 8.4–10.5)
CHLORIDE SERPL-SCNC: 103 MMOL/L — SIGNIFICANT CHANGE UP (ref 98–107)
CO2 SERPL-SCNC: 24 MMOL/L — SIGNIFICANT CHANGE UP (ref 22–31)
CREAT SERPL-MCNC: 1.69 MG/DL — HIGH (ref 0.5–1.3)
EGFR: 45 ML/MIN/1.73M2 — LOW
GLUCOSE SERPL-MCNC: 103 MG/DL — HIGH (ref 70–99)
HCT VFR BLD CALC: 30.9 % — LOW (ref 39–50)
HGB BLD-MCNC: 9.9 G/DL — LOW (ref 13–17)
MAGNESIUM SERPL-MCNC: 2.3 MG/DL — SIGNIFICANT CHANGE UP (ref 1.6–2.6)
MCHC RBC-ENTMCNC: 24.5 PG — LOW (ref 27–34)
MCHC RBC-ENTMCNC: 32 GM/DL — SIGNIFICANT CHANGE UP (ref 32–36)
MCV RBC AUTO: 76.5 FL — LOW (ref 80–100)
NRBC # BLD: 0 /100 WBCS — SIGNIFICANT CHANGE UP (ref 0–0)
NRBC # FLD: 0 K/UL — SIGNIFICANT CHANGE UP (ref 0–0)
PHOSPHATE SERPL-MCNC: 2.7 MG/DL — SIGNIFICANT CHANGE UP (ref 2.5–4.5)
PLATELET # BLD AUTO: 326 K/UL — SIGNIFICANT CHANGE UP (ref 150–400)
POTASSIUM SERPL-MCNC: 4.2 MMOL/L — SIGNIFICANT CHANGE UP (ref 3.5–5.3)
POTASSIUM SERPL-SCNC: 4.2 MMOL/L — SIGNIFICANT CHANGE UP (ref 3.5–5.3)
RBC # BLD: 4.04 M/UL — LOW (ref 4.2–5.8)
RBC # FLD: 14 % — SIGNIFICANT CHANGE UP (ref 10.3–14.5)
SODIUM SERPL-SCNC: 140 MMOL/L — SIGNIFICANT CHANGE UP (ref 135–145)
WBC # BLD: 9.99 K/UL — SIGNIFICANT CHANGE UP (ref 3.8–10.5)
WBC # FLD AUTO: 9.99 K/UL — SIGNIFICANT CHANGE UP (ref 3.8–10.5)

## 2024-04-13 RX ORDER — ATORVASTATIN CALCIUM 80 MG/1
40 TABLET, FILM COATED ORAL AT BEDTIME
Refills: 0 | Status: DISCONTINUED | OUTPATIENT
Start: 2024-04-13 | End: 2024-04-18

## 2024-04-13 RX ORDER — SODIUM CHLORIDE 9 MG/ML
1000 INJECTION, SOLUTION INTRAVENOUS
Refills: 0 | Status: DISCONTINUED | OUTPATIENT
Start: 2024-04-13 | End: 2024-04-13

## 2024-04-13 RX ORDER — SODIUM,POTASSIUM PHOSPHATES 278-250MG
1 POWDER IN PACKET (EA) ORAL ONCE
Refills: 0 | Status: COMPLETED | OUTPATIENT
Start: 2024-04-13 | End: 2024-04-13

## 2024-04-13 RX ORDER — LOSARTAN POTASSIUM 100 MG/1
25 TABLET, FILM COATED ORAL DAILY
Refills: 0 | Status: DISCONTINUED | OUTPATIENT
Start: 2024-04-13 | End: 2024-04-13

## 2024-04-13 RX ORDER — LOSARTAN POTASSIUM 100 MG/1
25 TABLET, FILM COATED ORAL DAILY
Refills: 0 | Status: DISCONTINUED | OUTPATIENT
Start: 2024-04-13 | End: 2024-04-14

## 2024-04-13 RX ORDER — DEXTROSE MONOHYDRATE, SODIUM CHLORIDE, AND POTASSIUM CHLORIDE 50; .745; 4.5 G/1000ML; G/1000ML; G/1000ML
1000 INJECTION, SOLUTION INTRAVENOUS
Refills: 0 | Status: DISCONTINUED | OUTPATIENT
Start: 2024-04-13 | End: 2024-04-13

## 2024-04-13 RX ORDER — ALLOPURINOL 300 MG
100 TABLET ORAL DAILY
Refills: 0 | Status: DISCONTINUED | OUTPATIENT
Start: 2024-04-13 | End: 2024-04-13

## 2024-04-13 RX ORDER — KETOROLAC TROMETHAMINE 30 MG/ML
15 SYRINGE (ML) INJECTION EVERY 6 HOURS
Refills: 0 | Status: DISCONTINUED | OUTPATIENT
Start: 2024-04-13 | End: 2024-04-13

## 2024-04-13 RX ORDER — ACETAMINOPHEN 500 MG
1000 TABLET ORAL EVERY 6 HOURS
Refills: 0 | Status: COMPLETED | OUTPATIENT
Start: 2024-04-13 | End: 2024-04-14

## 2024-04-13 RX ADMIN — LOSARTAN POTASSIUM 25 MILLIGRAM(S): 100 TABLET, FILM COATED ORAL at 12:01

## 2024-04-13 RX ADMIN — AMLODIPINE BESYLATE 10 MILLIGRAM(S): 2.5 TABLET ORAL at 05:49

## 2024-04-13 RX ADMIN — Medication 1 PACKET(S): at 09:56

## 2024-04-13 RX ADMIN — Medication 15 MILLIGRAM(S): at 12:30

## 2024-04-13 RX ADMIN — OXYCODONE HYDROCHLORIDE 5 MILLIGRAM(S): 5 TABLET ORAL at 16:07

## 2024-04-13 RX ADMIN — Medication 15 MILLIGRAM(S): at 12:01

## 2024-04-13 RX ADMIN — Medication 100 MILLIGRAM(S): at 12:01

## 2024-04-13 RX ADMIN — OXYCODONE HYDROCHLORIDE 5 MILLIGRAM(S): 5 TABLET ORAL at 15:50

## 2024-04-13 RX ADMIN — ATORVASTATIN CALCIUM 40 MILLIGRAM(S): 80 TABLET, FILM COATED ORAL at 21:39

## 2024-04-13 RX ADMIN — Medication 400 MILLIGRAM(S): at 15:50

## 2024-04-13 RX ADMIN — Medication 400 MILLIGRAM(S): at 23:49

## 2024-04-13 RX ADMIN — HEPARIN SODIUM 5000 UNIT(S): 5000 INJECTION INTRAVENOUS; SUBCUTANEOUS at 21:38

## 2024-04-13 RX ADMIN — HEPARIN SODIUM 5000 UNIT(S): 5000 INJECTION INTRAVENOUS; SUBCUTANEOUS at 05:43

## 2024-04-13 RX ADMIN — HEPARIN SODIUM 5000 UNIT(S): 5000 INJECTION INTRAVENOUS; SUBCUTANEOUS at 14:51

## 2024-04-13 RX ADMIN — NORTRIPTYLINE HYDROCHLORIDE 10 MILLIGRAM(S): 10 CAPSULE ORAL at 12:01

## 2024-04-13 RX ADMIN — Medication 25 MILLIGRAM(S): at 05:42

## 2024-04-13 RX ADMIN — Medication 1000 MILLIGRAM(S): at 16:07

## 2024-04-13 RX ADMIN — PANTOPRAZOLE SODIUM 40 MILLIGRAM(S): 20 TABLET, DELAYED RELEASE ORAL at 05:42

## 2024-04-13 RX ADMIN — OXYCODONE HYDROCHLORIDE 5 MILLIGRAM(S): 5 TABLET ORAL at 05:42

## 2024-04-13 RX ADMIN — OXYCODONE HYDROCHLORIDE 5 MILLIGRAM(S): 5 TABLET ORAL at 06:40

## 2024-04-13 NOTE — PROGRESS NOTE ADULT - ASSESSMENT
64y Male s/p robo-assited lap L hemicolectomy. Recovering appropriately after Surgery.     Plan:  -CLD, IVF  -D/c YOSELYN espinozaV  -Continue home meds  -Pain control: tylenol q6, toradol 15 mg q6, oxy prn  -OOB/AAT  -Strict I&O's  -Dispo: surgical floor    E Team Surgery,  [id number]

## 2024-04-13 NOTE — PROGRESS NOTE ADULT - SUBJECTIVE AND OBJECTIVE BOX
pt seen and examined, no complaints, ROS - .        allopurinol 100 milliGRAM(s) Oral daily  amLODIPine   Tablet 10 milliGRAM(s) Oral daily  atorvastatin 40 milliGRAM(s) Oral at bedtime  heparin   Injectable 5000 Unit(s) SubCutaneous every 8 hours  influenza   Vaccine 0.5 milliLiter(s) IntraMuscular once  ketorolac   Injectable 15 milliGRAM(s) IV Push every 6 hours  lactated ringers. 1000 milliLiter(s) IV Continuous <Continuous>  losartan 25 milliGRAM(s) Oral daily  metoprolol succinate ER 25 milliGRAM(s) Oral daily  nortriptyline 10 milliGRAM(s) Oral daily  oxyCODONE    IR 2.5 milliGRAM(s) Oral every 4 hours PRN  oxyCODONE    IR 5 milliGRAM(s) Oral every 4 hours PRN  pantoprazole    Tablet 40 milliGRAM(s) Oral before breakfast  potassium phosphate / sodium phosphate Powder (PHOS-NaK) 1 Packet(s) Oral once                            9.9    9.99  )-----------( 326      ( 13 Apr 2024 05:54 )             30.9       Hemoglobin: 9.9 g/dL (04-13 @ 05:54)      04-13    140  |  103  |  16  ----------------------------<  103<H>  4.2   |  24  |  1.69<H>    Ca    8.7      13 Apr 2024 05:54  Phos  2.7     04-13  Mg     2.30     04-13      Creatinine Trend: 1.69<--    COAGS:           T(C): 36.7 (04-13-24 @ 08:42), Max: 36.9 (04-12-24 @ 15:30)  HR: 92 (04-13-24 @ 08:42) (74 - 92)  BP: 147/87 (04-13-24 @ 08:42) (117/93 - 157/92)  RR: 18 (04-13-24 @ 08:42) (10 - 18)  SpO2: 96% (04-13-24 @ 08:42) (94% - 100%)  Wt(kg): --    I&O's Summary    12 Apr 2024 07:01  -  13 Apr 2024 07:00  --------------------------------------------------------  IN: 480 mL / OUT: 4400 mL / NET: -3920 mL      Gen: Appearscomfortable  HEENT:  (-)icterus (-)pallor  CV: N S1 S2 1/6 DONALDO (+)2 Pulses B/l  Resp:  Clear to ausculatation B/L, normal effort  GI: deferred  Lymph:  (-)Edema  Skin: Warm to touch, Normal turgor    	  64 year old male PMH HTN, HLD, CKD (last creatinine 1.74 on 3/8/24) and anxiety, with last NST 11/2023 with no ischemia or infarct and last TTE 11/2023 with Normal LV/RV function, who was recently dx with colon cancer now s/p left hemicolectomy.     Tele stable   cont IV hydration , clears per sx.   GI / DVT prophylaxis.   keep K>4, mag >2.0   Pain management  bp stable on present meds

## 2024-04-13 NOTE — PROGRESS NOTE ADULT - SUBJECTIVE AND OBJECTIVE BOX
SURGERY DAILY PROGRESS NOTE:     SUBJECTIVE/ROS: Patient seen and evaluated on AM rounds. Reports that pain is not well controlled on current regimen. Feels bloated. Encouraged ambulation. Denies nausea, vomiting, chest pain, shortness of breath       OBJECTIVE:  Vital Signs Last 24 Hrs  T(C): 36.7 (13 Apr 2024 08:42), Max: 36.9 (12 Apr 2024 15:30)  T(F): 98 (13 Apr 2024 08:42), Max: 98.5 (12 Apr 2024 15:30)  HR: 92 (13 Apr 2024 08:42) (74 - 92)  BP: 147/87 (13 Apr 2024 08:42) (117/93 - 157/92)  BP(mean): 91 (12 Apr 2024 17:30) (87 - 108)  RR: 18 (13 Apr 2024 08:42) (10 - 18)  SpO2: 96% (13 Apr 2024 08:42) (94% - 100%)    Parameters below as of 13 Apr 2024 08:42  Patient On (Oxygen Delivery Method): room air      I&O's Detail    12 Apr 2024 07:01  -  13 Apr 2024 07:00  --------------------------------------------------------  IN:    Lactated Ringers: 480 mL  Total IN: 480 mL    OUT:    Indwelling Catheter - Urethral (mL): 4400 mL  Total OUT: 4400 mL    Total NET: -3920 mL        Daily     Daily   MEDICATIONS  (STANDING):  allopurinol 100 milliGRAM(s) Oral daily  amLODIPine   Tablet 10 milliGRAM(s) Oral daily  atorvastatin 40 milliGRAM(s) Oral at bedtime  heparin   Injectable 5000 Unit(s) SubCutaneous every 8 hours  influenza   Vaccine 0.5 milliLiter(s) IntraMuscular once  ketorolac   Injectable 15 milliGRAM(s) IV Push every 6 hours  lactated ringers. 1000 milliLiter(s) (30 mL/Hr) IV Continuous <Continuous>  losartan 25 milliGRAM(s) Oral daily  metoprolol succinate ER 25 milliGRAM(s) Oral daily  nortriptyline 10 milliGRAM(s) Oral daily  pantoprazole    Tablet 40 milliGRAM(s) Oral before breakfast    MEDICATIONS  (PRN):  oxyCODONE    IR 2.5 milliGRAM(s) Oral every 4 hours PRN Moderate Pain (4 - 6)  oxyCODONE    IR 5 milliGRAM(s) Oral every 4 hours PRN Severe Pain (7 - 10)      LABS:                        9.9    9.99  )-----------( 326      ( 13 Apr 2024 05:54 )             30.9     04-13    140  |  103  |  16  ----------------------------<  103<H>  4.2   |  24  |  1.69<H>    Ca    8.7      13 Apr 2024 05:54  Phos  2.7     04-13  Mg     2.30     04-13        Physical Exam  Gen: NAD, A&Ox3  Pulm: No respiratory distress, no subcostal retractions  CV: RRR, no JVD  Abd: Soft, mildly diffusely TTP, mildly distended, dressings c/d/i  Extremities:  FROM, warm and well perfused, equal bilateral muscle strength  : espinoza in place, urine clear

## 2024-04-13 NOTE — PROGRESS NOTE ADULT - SUBJECTIVE AND OBJECTIVE BOX
Name of Patient : CATHRYN AYALA  MRN: [id number]  Date of visit: 04-13-24       Patient is a 64 year old male PMH HTN, HLD, CKD (last creatinine 1.74 on 3/8/24) and anxiety, with last NST 11/2023 with no ischemia or infarct and last TTE 11/2023 with Normal LV/RV function, who was recently dx with colon cancer now s/p left hemicolectomy.     Subjective: Patient seen and examined. No new events except as noted.   doing okay    REVIEW OF SYSTEMS:    CONSTITUTIONAL: No weakness, fevers or chills  EYES/ENT: No visual changes;  No vertigo or throat pain   NECK: No pain or stiffness  RESPIRATORY: No cough, wheezing, hemoptysis; No shortness of breath  CARDIOVASCULAR: No chest pain or palpitations  GASTROINTESTINAL: No abdominal or epigastric pain. No nausea, vomiting, or hematemesis; No diarrhea or constipation. No melena or hematochezia.  GENITOURINARY: No dysuria, frequency or hematuria  NEUROLOGICAL: No numbness or weakness  SKIN: No itching, burning, rashes, or lesions   All other review of systems is negative unless indicated above.    MEDICATIONS:  MEDICATIONS  (STANDING):  acetaminophen   IVPB .. 1000 milliGRAM(s) IV Intermittent every 6 hours  amLODIPine   Tablet 10 milliGRAM(s) Oral daily  atorvastatin 40 milliGRAM(s) Oral at bedtime  heparin   Injectable 5000 Unit(s) SubCutaneous every 8 hours  influenza   Vaccine 0.5 milliLiter(s) IntraMuscular once  losartan 25 milliGRAM(s) Oral daily  metoprolol succinate ER 25 milliGRAM(s) Oral daily  nortriptyline 10 milliGRAM(s) Oral daily  pantoprazole    Tablet 40 milliGRAM(s) Oral before breakfast    Home Medications:  allopurinol 100 mg oral tablet: 1 tab(s) orally once a day (12 Apr 2024 06:03)  chlorthalidone 25 mg oral tablet: 1 tab(s) orally once a day (12 Apr 2024 06:03)  fenofibrate 48 mg oral tablet: 1 tab(s) orally once a day (12 Apr 2024 06:03)  losartan 25 mg oral tablet: 1 tab(s) orally once a day (12 Apr 2024 06:03)  metoprolol succinate 25 mg oral tablet, extended release: 1 tab(s) orally once a day (12 Apr 2024 06:03)  nortriptyline 10 mg oral capsule: 1 cap(s) orally once a day (at bedtime) (12 Apr 2024 06:03)  simvastatin 20 mg oral tablet: 1 tab(s) orally once a day (12 Apr 2024 06:03)      PAST MEDICAL & SURGICAL HISTORY:  Hypertension      Anxiety      Hyperlipidemia      Malignant neoplasm of colon      History of BPH      S/P arthroscopy of right knee    Allergies    No Known Allergies    Intolerances          PHYSICAL EXAM:  T(C): 36.8 (04-13-24 @ 16:48), Max: 36.9 (04-13-24 @ 05:05)  HR: 84 (04-13-24 @ 16:48) (81 - 92)  BP: 120/76 (04-13-24 @ 16:48) (120/76 - 148/77)  RR: 18 (04-13-24 @ 16:48) (12 - 18)  SpO2: 97% (04-13-24 @ 16:48) (94% - 100%)  Wt(kg): --  I&O's Summary    12 Apr 2024 07:01  -  13 Apr 2024 07:00  --------------------------------------------------------  IN: 480 mL / OUT: 4400 mL / NET: -3920 mL    13 Apr 2024 07:01  -  13 Apr 2024 17:27  --------------------------------------------------------  IN: 760 mL / OUT: 1000 mL / NET: -240 mL          Appearance: Normal	  HEENT:  PERRLA   Lymphatic: No lymphadenopathy   Cardiovascular: Normal S1 S2, no JVD  Respiratory: normal effort , clear  Gastrointestinal:  Soft, Non-tender  Skin: No rashes,  warm to touch  Psychiatry:  Mood & affect appropriate  Musculuskeletal: No edema    recent labs, Imaging and EKGs personally reviewed       04-12-24 @ 07:01  -  04-13-24 @ 07:00  --------------------------------------------------------  IN: 480 mL / OUT: 4400 mL / NET: -3920 mL    04-13-24 @ 07:01  -  04-13-24 @ 17:27  --------------------------------------------------------  IN: 760 mL / OUT: 1000 mL / NET: -240 mL                          9.9    9.99  )-----------( 326      ( 13 Apr 2024 05:54 )             30.9               04-13    140  |  103  |  16  ----------------------------<  103<H>  4.2   |  24  |  1.69<H>    Ca    8.7      13 Apr 2024 05:54  Phos  2.7     04-13  Mg     2.30     04-13                         Urinalysis Basic - ( 13 Apr 2024 05:54 )    Color: x / Appearance: x / SG: x / pH: x  Gluc: 103 mg/dL / Ketone: x  / Bili: x / Urobili: x   Blood: x / Protein: x / Nitrite: x   Leuk Esterase: x / RBC: x / WBC x   Sq Epi: x / Non Sq Epi: x / Bacteria: x

## 2024-04-13 NOTE — PROGRESS NOTE ADULT - ASSESSMENT
Patient is a 64 year old male PMH HTN, HLD, CKD (last creatinine 1.74 on 3/8/24) and anxiety, with last NST 11/2023 with no ischemia or infarct and last TTE 11/2023 with Normal LV/RV function, who was recently dx with colon cancer now s/p left hemicolectomy.       # S/P L Hemicolectomy   surg care appreciated   clears as tolertaed, advance as tolertaed  monitor electrolytes    # Anemia  Monitor hgb level   transfuse to keep hgb above 7     # HTN/HLD  resume oral meds as tolertaed   lipid panel  statin     # CKD  monitor renal function  hydration  monitor uribne output     DVT and GI PPX

## 2024-04-13 NOTE — PROGRESS NOTE ADULT - SUBJECTIVE AND OBJECTIVE BOX
ANESTHESIA POSTOP CHECK    64y Male POSTOP DAY 1     No COMPLAINTS    NO APPARENT ANESTHESIA COMPLICATIONS

## 2024-04-14 LAB
ANION GAP SERPL CALC-SCNC: 15 MMOL/L — HIGH (ref 7–14)
BUN SERPL-MCNC: 20 MG/DL — SIGNIFICANT CHANGE UP (ref 7–23)
CALCIUM SERPL-MCNC: 8.7 MG/DL — SIGNIFICANT CHANGE UP (ref 8.4–10.5)
CHLORIDE SERPL-SCNC: 102 MMOL/L — SIGNIFICANT CHANGE UP (ref 98–107)
CO2 SERPL-SCNC: 23 MMOL/L — SIGNIFICANT CHANGE UP (ref 22–31)
CREAT SERPL-MCNC: 2.21 MG/DL — HIGH (ref 0.5–1.3)
EGFR: 33 ML/MIN/1.73M2 — LOW
GLUCOSE SERPL-MCNC: 108 MG/DL — HIGH (ref 70–99)
HCT VFR BLD CALC: 32.4 % — LOW (ref 39–50)
HGB BLD-MCNC: 9.8 G/DL — LOW (ref 13–17)
MAGNESIUM SERPL-MCNC: 2.4 MG/DL — SIGNIFICANT CHANGE UP (ref 1.6–2.6)
MCHC RBC-ENTMCNC: 23.5 PG — LOW (ref 27–34)
MCHC RBC-ENTMCNC: 30.2 GM/DL — LOW (ref 32–36)
MCV RBC AUTO: 77.7 FL — LOW (ref 80–100)
NRBC # BLD: 0 /100 WBCS — SIGNIFICANT CHANGE UP (ref 0–0)
NRBC # FLD: 0 K/UL — SIGNIFICANT CHANGE UP (ref 0–0)
PHOSPHATE SERPL-MCNC: 3.2 MG/DL — SIGNIFICANT CHANGE UP (ref 2.5–4.5)
PLATELET # BLD AUTO: 281 K/UL — SIGNIFICANT CHANGE UP (ref 150–400)
POTASSIUM SERPL-MCNC: 3.9 MMOL/L — SIGNIFICANT CHANGE UP (ref 3.5–5.3)
POTASSIUM SERPL-SCNC: 3.9 MMOL/L — SIGNIFICANT CHANGE UP (ref 3.5–5.3)
RBC # BLD: 4.17 M/UL — LOW (ref 4.2–5.8)
RBC # FLD: 14.6 % — HIGH (ref 10.3–14.5)
SODIUM SERPL-SCNC: 140 MMOL/L — SIGNIFICANT CHANGE UP (ref 135–145)
WBC # BLD: 9.08 K/UL — SIGNIFICANT CHANGE UP (ref 3.8–10.5)
WBC # FLD AUTO: 9.08 K/UL — SIGNIFICANT CHANGE UP (ref 3.8–10.5)

## 2024-04-14 RX ADMIN — AMLODIPINE BESYLATE 10 MILLIGRAM(S): 2.5 TABLET ORAL at 05:15

## 2024-04-14 RX ADMIN — OXYCODONE HYDROCHLORIDE 5 MILLIGRAM(S): 5 TABLET ORAL at 17:22

## 2024-04-14 RX ADMIN — NORTRIPTYLINE HYDROCHLORIDE 10 MILLIGRAM(S): 10 CAPSULE ORAL at 12:37

## 2024-04-14 RX ADMIN — Medication 25 MILLIGRAM(S): at 05:11

## 2024-04-14 RX ADMIN — Medication 400 MILLIGRAM(S): at 05:08

## 2024-04-14 RX ADMIN — HEPARIN SODIUM 5000 UNIT(S): 5000 INJECTION INTRAVENOUS; SUBCUTANEOUS at 12:37

## 2024-04-14 RX ADMIN — OXYCODONE HYDROCHLORIDE 5 MILLIGRAM(S): 5 TABLET ORAL at 17:49

## 2024-04-14 RX ADMIN — LOSARTAN POTASSIUM 25 MILLIGRAM(S): 100 TABLET, FILM COATED ORAL at 05:10

## 2024-04-14 RX ADMIN — PANTOPRAZOLE SODIUM 40 MILLIGRAM(S): 20 TABLET, DELAYED RELEASE ORAL at 05:11

## 2024-04-14 RX ADMIN — HEPARIN SODIUM 5000 UNIT(S): 5000 INJECTION INTRAVENOUS; SUBCUTANEOUS at 05:10

## 2024-04-14 RX ADMIN — HEPARIN SODIUM 5000 UNIT(S): 5000 INJECTION INTRAVENOUS; SUBCUTANEOUS at 21:27

## 2024-04-14 RX ADMIN — ATORVASTATIN CALCIUM 40 MILLIGRAM(S): 80 TABLET, FILM COATED ORAL at 21:27

## 2024-04-14 NOTE — PROGRESS NOTE ADULT - SUBJECTIVE AND OBJECTIVE BOX
TEAM [ ** ] Surgery Daily Progress Note  =====================================================    SUBJECTIVE: Patient seen and examined at bedside on AM rounds. Patient reports that they're feeling well, tolerating CLD. Has not passed gas yet. Voiding.  Denies fever, chills, N/V, chest pain, SOB    ALLERGIES:  No Known Allergies      --------------------------------------------------------------------------------------    MEDICATIONS:    Neurologic Medications  acetaminophen   IVPB .. 1000 milliGRAM(s) IV Intermittent every 6 hours  nortriptyline 10 milliGRAM(s) Oral daily  oxyCODONE    IR 2.5 milliGRAM(s) Oral every 4 hours PRN Moderate Pain (4 - 6)  oxyCODONE    IR 5 milliGRAM(s) Oral every 4 hours PRN Severe Pain (7 - 10)    Respiratory Medications    Cardiovascular Medications  amLODIPine   Tablet 10 milliGRAM(s) Oral daily  metoprolol succinate ER 25 milliGRAM(s) Oral daily    Gastrointestinal Medications  pantoprazole    Tablet 40 milliGRAM(s) Oral before breakfast    Genitourinary Medications    Hematologic/Oncologic Medications  heparin   Injectable 5000 Unit(s) SubCutaneous every 8 hours  influenza   Vaccine 0.5 milliLiter(s) IntraMuscular once    Antimicrobial/Immunologic Medications    Endocrine/Metabolic Medications  atorvastatin 40 milliGRAM(s) Oral at bedtime    Topical/Other Medications    --------------------------------------------------------------------------------------    VITAL SIGNS:  T(C): 36.7 (04-14-24 @ 05:05), Max: 36.8 (04-13-24 @ 16:48)  HR: 77 (04-14-24 @ 05:05) (73 - 84)  BP: 156/80 (04-14-24 @ 05:05) (109/68 - 156/80)  RR: 18 (04-14-24 @ 05:05) (18 - 18)  SpO2: 98% (04-14-24 @ 05:05) (96% - 98%)  --------------------------------------------------------------------------------------    INS AND OUTS:    04-13-24 @ 07:01  -  04-14-24 @ 07:00  --------------------------------------------------------  IN: 760 mL / OUT: 1250 mL / NET: -490 mL    04-14-24 @ 07:01  -  04-14-24 @ 11:53  --------------------------------------------------------  IN: 0 mL / OUT: 500 mL / NET: -500 mL      --------------------------------------------------------------------------------------      EXAM    Physical Examination:  Gen: NAD  Pulm: No respiratory distress, no subcostal retractions  CV: RR  Abd: Soft, mildly distended, appropriately tender,  Aquacel dressing with some leakage, changed to gauze+tape on AM rounds.   Extremities:  FROM, warm and well perfused       --------------------------------------------------------------------------------------    LABS                          9.8    9.08  )-----------( 281      ( 14 Apr 2024 05:53 )             32.4     04-14    140  |  102  |  20  ----------------------------<  108<H>  3.9   |  23  |  2.21<H>    Ca    8.7      14 Apr 2024 05:53  Phos  3.2     04-14  Mg     2.40     04-14        Urinalysis Basic - ( 14 Apr 2024 05:53 )    Color: x / Appearance: x / SG: x / pH: x  Gluc: 108 mg/dL / Ketone: x  / Bili: x / Urobili: x   Blood: x / Protein: x / Nitrite: x   Leuk Esterase: x / RBC: x / WBC x   Sq Epi: x / Non Sq Epi: x / Bacteria: x      No Known Allergies    T(C): 36.7 (04-14-24 @ 05:05), Max: 36.8 (04-13-24 @ 16:48)  HR: 77 (04-14-24 @ 05:05) (73 - 84)  BP: 156/80 (04-14-24 @ 05:05) (109/68 - 156/80)  RR: 18 (04-14-24 @ 05:05) (18 - 18)  SpO2: 98% (04-14-24 @ 05:05) (96% - 98%)    04-13-24 @ 07:01  -  04-14-24 @ 07:00  --------------------------------------------------------  IN: 760 mL / OUT: 1250 mL / NET: -490 mL    04-14-24 @ 07:01  -  04-14-24 @ 11:53  --------------------------------------------------------  IN: 0 mL / OUT: 500 mL / NET: -500 mL

## 2024-04-14 NOTE — PROGRESS NOTE ADULT - SUBJECTIVE AND OBJECTIVE BOX
Name of Patient : CATHRYN AYALA  MRN: [id number]  Date of visit: 04-14-24       Subjective: Patient seen and examined. No new events except as noted.     REVIEW OF SYSTEMS:    CONSTITUTIONAL: No weakness, fevers or chills  EYES/ENT: No visual changes;  No vertigo or throat pain   NECK: No pain or stiffness  RESPIRATORY: No cough, wheezing, hemoptysis; No shortness of breath  CARDIOVASCULAR: No chest pain or palpitations  GASTROINTESTINAL: No abdominal or epigastric pain. No nausea, vomiting, or hematemesis; No diarrhea or constipation. No melena or hematochezia.  GENITOURINARY: No dysuria, frequency or hematuria  NEUROLOGICAL: No numbness or weakness  SKIN: No itching, burning, rashes, or lesions   All other review of systems is negative unless indicated above.    MEDICATIONS:  MEDICATIONS  (STANDING):  amLODIPine   Tablet 10 milliGRAM(s) Oral daily  atorvastatin 40 milliGRAM(s) Oral at bedtime  heparin   Injectable 5000 Unit(s) SubCutaneous every 8 hours  influenza   Vaccine 0.5 milliLiter(s) IntraMuscular once  metoprolol succinate ER 25 milliGRAM(s) Oral daily  nortriptyline 10 milliGRAM(s) Oral daily  pantoprazole    Tablet 40 milliGRAM(s) Oral before breakfast      PHYSICAL EXAM:  T(C): 37.1 (04-14-24 @ 20:37), Max: 37.1 (04-14-24 @ 20:37)  HR: 97 (04-14-24 @ 20:37) (75 - 97)  BP: 142/83 (04-14-24 @ 20:37) (115/73 - 156/80)  RR: 18 (04-14-24 @ 20:37) (18 - 18)  SpO2: 97% (04-14-24 @ 20:37) (96% - 99%)  Wt(kg): --  I&O's Summary    13 Apr 2024 07:01  -  14 Apr 2024 07:00  --------------------------------------------------------  IN: 760 mL / OUT: 1250 mL / NET: -490 mL    14 Apr 2024 07:01  -  14 Apr 2024 21:14  --------------------------------------------------------  IN: 1400 mL / OUT: 1500 mL / NET: -100 mL          Appearance: Normal	  HEENT:  PERRLA   Lymphatic: No lymphadenopathy   Cardiovascular: Normal S1 S2, no JVD  Respiratory: normal effort , clear  Gastrointestinal:  Soft, Non-tender  Skin: No rashes,  warm to touch  Psychiatry:  Mood & affect appropriate  Musculuskeletal: No edema    recent labs, Imaging and EKGs personally reviewed     04-13-24 @ 07:01  -  04-14-24 @ 07:00  --------------------------------------------------------  IN: 760 mL / OUT: 1250 mL / NET: -490 mL    04-14-24 @ 07:01  -  04-14-24 @ 21:14  --------------------------------------------------------  IN: 1400 mL / OUT: 1500 mL / NET: -100 mL                            9.8    9.08  )-----------( 281      ( 14 Apr 2024 05:53 )             32.4               04-14    140  |  102  |  20  ----------------------------<  108<H>  3.9   |  23  |  2.21<H>    Ca    8.7      14 Apr 2024 05:53  Phos  3.2     04-14  Mg     2.40     04-14                         Urinalysis Basic - ( 14 Apr 2024 05:53 )    Color: x / Appearance: x / SG: x / pH: x  Gluc: 108 mg/dL / Ketone: x  / Bili: x / Urobili: x   Blood: x / Protein: x / Nitrite: x   Leuk Esterase: x / RBC: x / WBC x   Sq Epi: x / Non Sq Epi: x / Bacteria: x

## 2024-04-14 NOTE — PROGRESS NOTE ADULT - SUBJECTIVE AND OBJECTIVE BOX
pt resting in bed, offers no complaints        acetaminophen   IVPB .. 1000 milliGRAM(s) IV Intermittent every 6 hours  amLODIPine   Tablet 10 milliGRAM(s) Oral daily  atorvastatin 40 milliGRAM(s) Oral at bedtime  heparin   Injectable 5000 Unit(s) SubCutaneous every 8 hours  influenza   Vaccine 0.5 milliLiter(s) IntraMuscular once  losartan 25 milliGRAM(s) Oral daily  metoprolol succinate ER 25 milliGRAM(s) Oral daily  nortriptyline 10 milliGRAM(s) Oral daily  oxyCODONE    IR 2.5 milliGRAM(s) Oral every 4 hours PRN  oxyCODONE    IR 5 milliGRAM(s) Oral every 4 hours PRN  pantoprazole    Tablet 40 milliGRAM(s) Oral before breakfast                            9.8    9.08  )-----------( 281      ( 14 Apr 2024 05:53 )             32.4       Hemoglobin: 9.8 g/dL (04-14 @ 05:53)  Hemoglobin: 9.9 g/dL (04-13 @ 05:54)      04-14    140  |  102  |  20  ----------------------------<  108<H>  3.9   |  23  |  2.21<H>    Ca    8.7      14 Apr 2024 05:53  Phos  3.2     04-14  Mg     2.40     04-14      Creatinine Trend: 2.21<--, 1.69<--    COAGS:           T(C): 36.7 (04-14-24 @ 05:05), Max: 36.9 (04-13-24 @ 11:51)  HR: 77 (04-14-24 @ 05:05) (73 - 90)  BP: 156/80 (04-14-24 @ 05:05) (109/68 - 156/80)  RR: 18 (04-14-24 @ 05:05) (17 - 18)  SpO2: 98% (04-14-24 @ 05:05) (96% - 100%)  Wt(kg): --    I&O's Summary    13 Apr 2024 07:01  -  14 Apr 2024 07:00  --------------------------------------------------------  IN: 760 mL / OUT: 1250 mL / NET: -490 mL      Gen: Appearscomfortable  HEENT:  (-)icterus (-)pallor  CV: N S1 S2 1/6 DONALDO (+)2 Pulses B/l  Resp:  Clear to ausculatation B/L, normal effort  GI: deferred  Lymph:  (-)Edema  Skin: Warm to touch, Normal turgor    	  64 year old male PMH HTN, HLD, CKD (last creatinine 1.74 on 3/8/24) and anxiety, with last NST 11/2023 with no ischemia or infarct and last TTE 11/2023 with Normal LV/RV function, who was recently dx with colon cancer now s/p left hemicolectomy.     Tele stable   inc ambulation   peter BB   GI / DVT prophylaxis.   keep K>4, mag >2.0   Pain management  bp stable on present meds Date of service 4/14  pt resting in bed, offers no complaints        acetaminophen   IVPB .. 1000 milliGRAM(s) IV Intermittent every 6 hours  amLODIPine   Tablet 10 milliGRAM(s) Oral daily  atorvastatin 40 milliGRAM(s) Oral at bedtime  heparin   Injectable 5000 Unit(s) SubCutaneous every 8 hours  influenza   Vaccine 0.5 milliLiter(s) IntraMuscular once  losartan 25 milliGRAM(s) Oral daily  metoprolol succinate ER 25 milliGRAM(s) Oral daily  nortriptyline 10 milliGRAM(s) Oral daily  oxyCODONE    IR 2.5 milliGRAM(s) Oral every 4 hours PRN  oxyCODONE    IR 5 milliGRAM(s) Oral every 4 hours PRN  pantoprazole    Tablet 40 milliGRAM(s) Oral before breakfast                            9.8    9.08  )-----------( 281      ( 14 Apr 2024 05:53 )             32.4       Hemoglobin: 9.8 g/dL (04-14 @ 05:53)  Hemoglobin: 9.9 g/dL (04-13 @ 05:54)      04-14    140  |  102  |  20  ----------------------------<  108<H>  3.9   |  23  |  2.21<H>    Ca    8.7      14 Apr 2024 05:53  Phos  3.2     04-14  Mg     2.40     04-14      Creatinine Trend: 2.21<--, 1.69<--    COAGS:           T(C): 36.7 (04-14-24 @ 05:05), Max: 36.9 (04-13-24 @ 11:51)  HR: 77 (04-14-24 @ 05:05) (73 - 90)  BP: 156/80 (04-14-24 @ 05:05) (109/68 - 156/80)  RR: 18 (04-14-24 @ 05:05) (17 - 18)  SpO2: 98% (04-14-24 @ 05:05) (96% - 100%)  Wt(kg): --    I&O's Summary    13 Apr 2024 07:01  -  14 Apr 2024 07:00  --------------------------------------------------------  IN: 760 mL / OUT: 1250 mL / NET: -490 mL      Gen: Appearscomfortable  HEENT:  (-)icterus (-)pallor  CV: N S1 S2 1/6 DONALDO (+)2 Pulses B/l  Resp:  Clear to ausculatation B/L, normal effort  GI: deferred  Lymph:  (-)Edema  Skin: Warm to touch, Normal turgor    	  64 year old male PMH HTN, HLD, CKD (last creatinine 1.74 on 3/8/24) and anxiety, with last NST 11/2023 with no ischemia or infarct and last TTE 11/2023 with Normal LV/RV function, who was recently dx with colon cancer now s/p left hemicolectomy.     Tele stable   inc ambulation   peter BB   GI / DVT prophylaxis.   keep K>4, mag >2.0   Pain management  bp stable on present meds

## 2024-04-14 NOTE — PROGRESS NOTE ADULT - ASSESSMENT
64y Male s/p robo-assited lap L hemicolectomy. Recovering appropriately after Surgery.     Plan:  -Advance to full liquids, keep IVF  -Continue home meds, Hold Losartan (d/t elevated Cr on 4/14)  -Pain control: tylenol q6,  oxy prn  (dc'd toradol)  -OOB/AAT  -Strict I&O's  - Aquacel dc'd 4/14 am d/t leak, replaced with gauze+tape  -Dispo: surgical floor    E Team Surgery,  [id number]

## 2024-04-15 LAB
ANION GAP SERPL CALC-SCNC: 17 MMOL/L — HIGH (ref 7–14)
BUN SERPL-MCNC: 18 MG/DL — SIGNIFICANT CHANGE UP (ref 7–23)
CALCIUM SERPL-MCNC: 8.8 MG/DL — SIGNIFICANT CHANGE UP (ref 8.4–10.5)
CHLORIDE SERPL-SCNC: 103 MMOL/L — SIGNIFICANT CHANGE UP (ref 98–107)
CO2 SERPL-SCNC: 21 MMOL/L — LOW (ref 22–31)
CREAT SERPL-MCNC: 1.76 MG/DL — HIGH (ref 0.5–1.3)
EGFR: 43 ML/MIN/1.73M2 — LOW
GLUCOSE SERPL-MCNC: 109 MG/DL — HIGH (ref 70–99)
HCT VFR BLD CALC: 31.6 % — LOW (ref 39–50)
HGB BLD-MCNC: 9.8 G/DL — LOW (ref 13–17)
MAGNESIUM SERPL-MCNC: 2.2 MG/DL — SIGNIFICANT CHANGE UP (ref 1.6–2.6)
MCHC RBC-ENTMCNC: 24.1 PG — LOW (ref 27–34)
MCHC RBC-ENTMCNC: 31 GM/DL — LOW (ref 32–36)
MCV RBC AUTO: 77.8 FL — LOW (ref 80–100)
NRBC # BLD: 0 /100 WBCS — SIGNIFICANT CHANGE UP (ref 0–0)
NRBC # FLD: 0 K/UL — SIGNIFICANT CHANGE UP (ref 0–0)
PHOSPHATE SERPL-MCNC: 2.9 MG/DL — SIGNIFICANT CHANGE UP (ref 2.5–4.5)
PLATELET # BLD AUTO: 324 K/UL — SIGNIFICANT CHANGE UP (ref 150–400)
POTASSIUM SERPL-MCNC: 3.8 MMOL/L — SIGNIFICANT CHANGE UP (ref 3.5–5.3)
POTASSIUM SERPL-SCNC: 3.8 MMOL/L — SIGNIFICANT CHANGE UP (ref 3.5–5.3)
RBC # BLD: 4.06 M/UL — LOW (ref 4.2–5.8)
RBC # FLD: 14.5 % — SIGNIFICANT CHANGE UP (ref 10.3–14.5)
SODIUM SERPL-SCNC: 141 MMOL/L — SIGNIFICANT CHANGE UP (ref 135–145)
WBC # BLD: 8.95 K/UL — SIGNIFICANT CHANGE UP (ref 3.8–10.5)
WBC # FLD AUTO: 8.95 K/UL — SIGNIFICANT CHANGE UP (ref 3.8–10.5)

## 2024-04-15 PROCEDURE — 74018 RADEX ABDOMEN 1 VIEW: CPT | Mod: 26,XE

## 2024-04-15 PROCEDURE — 74019 RADEX ABDOMEN 2 VIEWS: CPT | Mod: 26

## 2024-04-15 RX ORDER — ONDANSETRON 8 MG/1
4 TABLET, FILM COATED ORAL EVERY 6 HOURS
Refills: 0 | Status: DISCONTINUED | OUTPATIENT
Start: 2024-04-15 | End: 2024-04-18

## 2024-04-15 RX ORDER — SODIUM,POTASSIUM PHOSPHATES 278-250MG
2 POWDER IN PACKET (EA) ORAL ONCE
Refills: 0 | Status: COMPLETED | OUTPATIENT
Start: 2024-04-15 | End: 2024-04-15

## 2024-04-15 RX ADMIN — Medication 2 PACKET(S): at 11:55

## 2024-04-15 RX ADMIN — AMLODIPINE BESYLATE 10 MILLIGRAM(S): 2.5 TABLET ORAL at 05:11

## 2024-04-15 RX ADMIN — Medication 25 MILLIGRAM(S): at 05:11

## 2024-04-15 RX ADMIN — ONDANSETRON 4 MILLIGRAM(S): 8 TABLET, FILM COATED ORAL at 02:18

## 2024-04-15 RX ADMIN — PANTOPRAZOLE SODIUM 40 MILLIGRAM(S): 20 TABLET, DELAYED RELEASE ORAL at 05:11

## 2024-04-15 RX ADMIN — HEPARIN SODIUM 5000 UNIT(S): 5000 INJECTION INTRAVENOUS; SUBCUTANEOUS at 13:58

## 2024-04-15 RX ADMIN — NORTRIPTYLINE HYDROCHLORIDE 10 MILLIGRAM(S): 10 CAPSULE ORAL at 11:55

## 2024-04-15 RX ADMIN — HEPARIN SODIUM 5000 UNIT(S): 5000 INJECTION INTRAVENOUS; SUBCUTANEOUS at 21:47

## 2024-04-15 RX ADMIN — ONDANSETRON 4 MILLIGRAM(S): 8 TABLET, FILM COATED ORAL at 10:17

## 2024-04-15 RX ADMIN — HEPARIN SODIUM 5000 UNIT(S): 5000 INJECTION INTRAVENOUS; SUBCUTANEOUS at 05:10

## 2024-04-15 RX ADMIN — ONDANSETRON 4 MILLIGRAM(S): 8 TABLET, FILM COATED ORAL at 23:51

## 2024-04-15 RX ADMIN — ONDANSETRON 4 MILLIGRAM(S): 8 TABLET, FILM COATED ORAL at 17:07

## 2024-04-15 RX ADMIN — ATORVASTATIN CALCIUM 40 MILLIGRAM(S): 80 TABLET, FILM COATED ORAL at 21:48

## 2024-04-15 NOTE — PROGRESS NOTE ADULT - ASSESSMENT
64y Male s/p robo-assited lap L hemicolectomy (4/12).  Recovering appropriately after Surgery.     Plan:  -Full liquids, keep IVF  -Continue home meds, Hold Losartan (d/t elevated Cr on 4/14)  -Pain control: tylenol q6,  oxy prn  (dc'd toradol)  -Monitor Creatinine and urine output  -OOB/AAT  -Strict I&O's  - Aquacel dc'd 4/14 am d/t leak, replaced with gauze+tape      E Team Surgery,  [id number]

## 2024-04-15 NOTE — PROGRESS NOTE ADULT - SUBJECTIVE AND OBJECTIVE BOX
Date of service 4/15/24    reports mild nausea, not passing gas/no BM yet       Review of Systems:   Constitutional: [ ] fevers, [ ] chills.   Skin: [ ] dry skin. [ ] rashes.  Psychiatric: [ ] depression, [ ] anxiety.   Gastrointestinal: [ ] BRBPR, [ ] melena.   Neurological: [ ] confusion. [ ] seizures. [ ] shuffling gait.   Ears,Nose,Mouth and Throat: [ ] ear pain [ ] sore throat.   Eyes: [ ] diplopia.   Respiratory: [ ] hemoptysis. [ ] shortness of breath  Cardiovascular: See HPI above  Hematologic/Lymphatic: [ ] anemia. [ ] painful nodes. [ ] prolonged bleeding.   Genitourinary: [ ] hematuria. [ ] flank pain.   Endocrine: [ ] significant change in weight. [ ] intolerance to heat and cold.     Review of systems [x ] otherwise negative, [ ] otherwise unable to obtain    FH: no family history of sudden cardiac death in first degree relatives    SH: [ ] tobacco, [ ] alcohol, [ ] drugs    amLODIPine   Tablet 10 milliGRAM(s) Oral daily  atorvastatin 40 milliGRAM(s) Oral at bedtime  heparin   Injectable 5000 Unit(s) SubCutaneous every 8 hours  influenza   Vaccine 0.5 milliLiter(s) IntraMuscular once  metoprolol succinate ER 25 milliGRAM(s) Oral daily  nortriptyline 10 milliGRAM(s) Oral daily  ondansetron Injectable 4 milliGRAM(s) IV Push every 6 hours PRN  oxyCODONE    IR 2.5 milliGRAM(s) Oral every 4 hours PRN  oxyCODONE    IR 5 milliGRAM(s) Oral every 4 hours PRN  pantoprazole    Tablet 40 milliGRAM(s) Oral before breakfast                            9.8    8.95  )-----------( 324      ( 15 Apr 2024 07:07 )             31.6       04-15    141  |  103  |  18  ----------------------------<  109<H>  3.8   |  21<L>  |  1.76<H>    Ca    8.8      15 Apr 2024 07:07  Phos  2.9     04-15  Mg     2.20     04-15      T(C): 36.4 (04-15-24 @ 12:07), Max: 37.1 (04-14-24 @ 20:37)  HR: 90 (04-15-24 @ 12:07) (87 - 107)  BP: 128/86 (04-15-24 @ 12:07) (127/88 - 155/88)  RR: 18 (04-15-24 @ 12:07) (17 - 18)  SpO2: 100% (04-15-24 @ 12:07) (97% - 100%)  Wt(kg): --    I&O's Summary    14 Apr 2024 07:01  -  15 Apr 2024 07:00  --------------------------------------------------------  IN: 1520 mL / OUT: 1900 mL / NET: -380 mL    15 Apr 2024 07:01  -  15 Apr 2024 15:30  --------------------------------------------------------  IN: 0 mL / OUT: 200 mL / NET: -200 mL        General: Well nourished in no acute distress. Alert and Oriented * 3.   Head: Normocephalic and atraumatic.   Neck: No JVD. No bruits. Supple. Does not appear to be enlarged.   Cardiovascular: + S1,S2 ; RRR Soft systolic murmur at the left lower sternal border. No rubs noted.    Lungs: CTA b/l. No rhonchi, rales or wheezes.   Abdomen: + BS, soft. Non tender. Non distended. No rebound. No guarding.   Extremities: No clubbing/cyanosis/edema.   Neurologic: Moves all four extremities. Full range of motion.   Skin: Warm and moist. The patient's skin has normal elasticity and good skin turgor.   Psychiatric: Appropriate mood and affect.  Musculoskeletal: Normal range of motion, normal strength  	  ASSESSMENT/PLAN: 	  64 year old male PMH HTN, HLD, CKD (last creatinine 1.74 on 3/8/24) and anxiety, with last NST 11/2023 with no ischemia or infarct and last TTE 11/2023 with Normal LV/RV function, who was recently dx with colon cancer now s/p left hemicolectomy.     --tolerated procedure well from CV perspective  --will follow post op to assist with BP management, agree with only resuming 1-2 meds now and slowly resuming home meds as tolerated  --post op care per surgery      [facility] BOB  [phone]

## 2024-04-15 NOTE — PROGRESS NOTE ADULT - SUBJECTIVE AND OBJECTIVE BOX
D Team Surgery Daily Progress Note    Subjective:   Patient seen at bedside this AM, resting comfortably in bed. Reports no abdominal pain, nausea overnight now improved, -Flatus/-BM. Amenable to self-regulating diet, no CP/SOB reported. Ambulating appropriately. No other concerns reported.     24h Events:   - Overnight, x1 mild nausea, mild abdominal distension no pain, no vomiting, responsive to zofran    Objective:  Vital Signs  T(C): 36.8 (04-15 @ 04:19), Max: 37.1 (04-14 @ 20:37)  HR: 98 (04-15 @ 04:19) (76 - 107)  BP: 139/71 (04-15 @ 04:19) (139/71 - 155/88)  RR: 18 (04-15 @ 04:19) (18 - 18)  SpO2: 97% (04-15 @ 04:19) (97% - 99%)  04-14-24 @ 07:01  -  04-15-24 @ 07:00  --------------------------------------------------------  IN:  Total IN: 0 mL    OUT:    Voided (mL): 1900 mL  Total OUT: 1900 mL    Total NET: -1900 mL          Physical Exam:  GEN: resting in bed comfortably in NAD  RESP: no increased WOB  ABD:  Soft, mildly distended, appropriately tender, gauze and tape over incision c/d/i, incision healing appropriately  EXTR: grossly intact  NEURO: AAOx4    Labs:                        9.8    8.95  )-----------( 324      ( 15 Apr 2024 07:07 )             31.6   04-14    140  |  102  |  20  ----------------------------<  108<H>  3.9   |  23  |  2.21<H>    Ca    8.7      14 Apr 2024 05:53  Phos  3.2     04-14  Mg     2.40     04-14      CAPILLARY BLOOD GLUCOSE          Medications:   MEDICATIONS  (STANDING):  amLODIPine   Tablet 10 milliGRAM(s) Oral daily  atorvastatin 40 milliGRAM(s) Oral at bedtime  heparin   Injectable 5000 Unit(s) SubCutaneous every 8 hours  influenza   Vaccine 0.5 milliLiter(s) IntraMuscular once  metoprolol succinate ER 25 milliGRAM(s) Oral daily  nortriptyline 10 milliGRAM(s) Oral daily  pantoprazole    Tablet 40 milliGRAM(s) Oral before breakfast    MEDICATIONS  (PRN):  ondansetron Injectable 4 milliGRAM(s) IV Push every 6 hours PRN Nausea and/or Vomiting  oxyCODONE    IR 2.5 milliGRAM(s) Oral every 4 hours PRN Moderate Pain (4 - 6)  oxyCODONE    IR 5 milliGRAM(s) Oral every 4 hours PRN Severe Pain (7 - 10)       E Team Surgery Daily Progress Note    Subjective:   Patient seen at bedside this AM, resting comfortably in bed. Reports no abdominal pain, nausea overnight now improved, -Flatus/-BM. Amenable to self-regulating diet, no CP/SOB reported. Ambulating appropriately. No other concerns reported.     24h Events:   - Overnight, x1 mild nausea, mild abdominal distension no pain, no vomiting, responsive to zofran    Objective:  Vital Signs  T(C): 36.8 (04-15 @ 04:19), Max: 37.1 (04-14 @ 20:37)  HR: 98 (04-15 @ 04:19) (76 - 107)  BP: 139/71 (04-15 @ 04:19) (139/71 - 155/88)  RR: 18 (04-15 @ 04:19) (18 - 18)  SpO2: 97% (04-15 @ 04:19) (97% - 99%)  04-14-24 @ 07:01  -  04-15-24 @ 07:00  --------------------------------------------------------  IN:  Total IN: 0 mL    OUT:    Voided (mL): 1900 mL  Total OUT: 1900 mL    Total NET: -1900 mL          Physical Exam:  GEN: resting in bed comfortably in NAD  RESP: no increased WOB  ABD:  Soft, mildly distended, appropriately tender, gauze and tape over incision c/d/i, incision healing appropriately  EXTR: grossly intact  NEURO: AAOx4    Labs:                        9.8    8.95  )-----------( 324      ( 15 Apr 2024 07:07 )             31.6   04-14    140  |  102  |  20  ----------------------------<  108<H>  3.9   |  23  |  2.21<H>    Ca    8.7      14 Apr 2024 05:53  Phos  3.2     04-14  Mg     2.40     04-14      CAPILLARY BLOOD GLUCOSE          Medications:   MEDICATIONS  (STANDING):  amLODIPine   Tablet 10 milliGRAM(s) Oral daily  atorvastatin 40 milliGRAM(s) Oral at bedtime  heparin   Injectable 5000 Unit(s) SubCutaneous every 8 hours  influenza   Vaccine 0.5 milliLiter(s) IntraMuscular once  metoprolol succinate ER 25 milliGRAM(s) Oral daily  nortriptyline 10 milliGRAM(s) Oral daily  pantoprazole    Tablet 40 milliGRAM(s) Oral before breakfast    MEDICATIONS  (PRN):  ondansetron Injectable 4 milliGRAM(s) IV Push every 6 hours PRN Nausea and/or Vomiting  oxyCODONE    IR 2.5 milliGRAM(s) Oral every 4 hours PRN Moderate Pain (4 - 6)  oxyCODONE    IR 5 milliGRAM(s) Oral every 4 hours PRN Severe Pain (7 - 10)

## 2024-04-16 ENCOUNTER — TRANSCRIPTION ENCOUNTER (OUTPATIENT)
Age: 64
End: 2024-04-16

## 2024-04-16 LAB
ANION GAP SERPL CALC-SCNC: 13 MMOL/L — SIGNIFICANT CHANGE UP (ref 7–14)
BUN SERPL-MCNC: 24 MG/DL — HIGH (ref 7–23)
CALCIUM SERPL-MCNC: 9.5 MG/DL — SIGNIFICANT CHANGE UP (ref 8.4–10.5)
CHLORIDE SERPL-SCNC: 100 MMOL/L — SIGNIFICANT CHANGE UP (ref 98–107)
CO2 SERPL-SCNC: 26 MMOL/L — SIGNIFICANT CHANGE UP (ref 22–31)
CREAT SERPL-MCNC: 1.78 MG/DL — HIGH (ref 0.5–1.3)
EGFR: 43 ML/MIN/1.73M2 — LOW
GLUCOSE SERPL-MCNC: 100 MG/DL — HIGH (ref 70–99)
HCT VFR BLD CALC: 34.5 % — LOW (ref 39–50)
HGB BLD-MCNC: 10.5 G/DL — LOW (ref 13–17)
MAGNESIUM SERPL-MCNC: 2.3 MG/DL — SIGNIFICANT CHANGE UP (ref 1.6–2.6)
MCHC RBC-ENTMCNC: 23.8 PG — LOW (ref 27–34)
MCHC RBC-ENTMCNC: 30.4 GM/DL — LOW (ref 32–36)
MCV RBC AUTO: 78.1 FL — LOW (ref 80–100)
NRBC # BLD: 0 /100 WBCS — SIGNIFICANT CHANGE UP (ref 0–0)
NRBC # FLD: 0 K/UL — SIGNIFICANT CHANGE UP (ref 0–0)
PHOSPHATE SERPL-MCNC: 3.2 MG/DL — SIGNIFICANT CHANGE UP (ref 2.5–4.5)
PLATELET # BLD AUTO: 370 K/UL — SIGNIFICANT CHANGE UP (ref 150–400)
POTASSIUM SERPL-MCNC: 3.8 MMOL/L — SIGNIFICANT CHANGE UP (ref 3.5–5.3)
POTASSIUM SERPL-SCNC: 3.8 MMOL/L — SIGNIFICANT CHANGE UP (ref 3.5–5.3)
RBC # BLD: 4.42 M/UL — SIGNIFICANT CHANGE UP (ref 4.2–5.8)
RBC # FLD: 14.6 % — HIGH (ref 10.3–14.5)
SODIUM SERPL-SCNC: 139 MMOL/L — SIGNIFICANT CHANGE UP (ref 135–145)
WBC # BLD: 9.64 K/UL — SIGNIFICANT CHANGE UP (ref 3.8–10.5)
WBC # FLD AUTO: 9.64 K/UL — SIGNIFICANT CHANGE UP (ref 3.8–10.5)

## 2024-04-16 PROCEDURE — 74018 RADEX ABDOMEN 1 VIEW: CPT | Mod: 26

## 2024-04-16 RX ORDER — ACETAMINOPHEN 500 MG
3 TABLET ORAL
Qty: 0 | Refills: 0 | DISCHARGE
Start: 2024-04-16

## 2024-04-16 RX ORDER — APIXABAN 2.5 MG/1
1 TABLET, FILM COATED ORAL
Qty: 60 | Refills: 0
Start: 2024-04-16 | End: 2024-05-15

## 2024-04-16 RX ORDER — PANTOPRAZOLE SODIUM 20 MG/1
1 TABLET, DELAYED RELEASE ORAL
Qty: 30 | Refills: 0
Start: 2024-04-16 | End: 2024-05-15

## 2024-04-16 RX ORDER — OXYCODONE HYDROCHLORIDE 5 MG/1
1 TABLET ORAL
Qty: 10 | Refills: 0
Start: 2024-04-16

## 2024-04-16 RX ORDER — ACETAMINOPHEN 500 MG
975 TABLET ORAL EVERY 6 HOURS
Refills: 0 | Status: DISCONTINUED | OUTPATIENT
Start: 2024-04-16 | End: 2024-04-18

## 2024-04-16 RX ADMIN — Medication 975 MILLIGRAM(S): at 12:00

## 2024-04-16 RX ADMIN — ONDANSETRON 4 MILLIGRAM(S): 8 TABLET, FILM COATED ORAL at 06:54

## 2024-04-16 RX ADMIN — ATORVASTATIN CALCIUM 40 MILLIGRAM(S): 80 TABLET, FILM COATED ORAL at 22:06

## 2024-04-16 RX ADMIN — Medication 975 MILLIGRAM(S): at 17:17

## 2024-04-16 RX ADMIN — HEPARIN SODIUM 5000 UNIT(S): 5000 INJECTION INTRAVENOUS; SUBCUTANEOUS at 05:26

## 2024-04-16 RX ADMIN — AMLODIPINE BESYLATE 10 MILLIGRAM(S): 2.5 TABLET ORAL at 05:26

## 2024-04-16 RX ADMIN — Medication 975 MILLIGRAM(S): at 11:19

## 2024-04-16 RX ADMIN — Medication 25 MILLIGRAM(S): at 05:26

## 2024-04-16 RX ADMIN — NORTRIPTYLINE HYDROCHLORIDE 10 MILLIGRAM(S): 10 CAPSULE ORAL at 11:18

## 2024-04-16 RX ADMIN — HEPARIN SODIUM 5000 UNIT(S): 5000 INJECTION INTRAVENOUS; SUBCUTANEOUS at 22:04

## 2024-04-16 RX ADMIN — PANTOPRAZOLE SODIUM 40 MILLIGRAM(S): 20 TABLET, DELAYED RELEASE ORAL at 05:26

## 2024-04-16 RX ADMIN — HEPARIN SODIUM 5000 UNIT(S): 5000 INJECTION INTRAVENOUS; SUBCUTANEOUS at 13:17

## 2024-04-16 RX ADMIN — Medication 975 MILLIGRAM(S): at 17:48

## 2024-04-16 NOTE — DISCHARGE NOTE PROVIDER - NSDCMRMEDTOKEN_GEN_ALL_CORE_FT
allopurinol 100 mg oral tablet: 1 tab(s) orally once a day  amLODIPine 10 mg oral tablet: 1 tab(s) orally once a day  chlorthalidone 25 mg oral tablet: 1 tab(s) orally once a day  fenofibrate 48 mg oral tablet: 1 tab(s) orally once a day  losartan 25 mg oral tablet: 1 tab(s) orally once a day  metoprolol succinate 25 mg oral tablet, extended release: 1 tab(s) orally once a day  nortriptyline 10 mg oral capsule: 1 cap(s) orally once a day (at bedtime)  simvastatin 20 mg oral tablet: 1 tab(s) orally once a day   acetaminophen 325 mg oral tablet: 3 tab(s) orally every 6 hours  allopurinol 100 mg oral tablet: 1 tab(s) orally once a day  amLODIPine 10 mg oral tablet: 1 tab(s) orally once a day  chlorthalidone 25 mg oral tablet: 1 tab(s) orally once a day  Eliquis 2.5 mg oral tablet: 1 tab(s) orally 2 times a day MDD: 2  fenofibrate 48 mg oral tablet: 1 tab(s) orally once a day  losartan 25 mg oral tablet: 1 tab(s) orally once a day  metoprolol succinate 25 mg oral tablet, extended release: 1 tab(s) orally once a day  nortriptyline 10 mg oral capsule: 1 cap(s) orally once a day (at bedtime)  Oxaydo 5 mg oral tablet: 1 tab(s) orally every 6 hours MDD: 20  pantoprazole 40 mg oral delayed release tablet: 1 tab(s) orally once a day (before a meal) MDD: 1  simvastatin 20 mg oral tablet: 1 tab(s) orally once a day

## 2024-04-16 NOTE — DISCHARGE NOTE PROVIDER - CARE PROVIDERS DIRECT ADDRESSES
[email or web address]

## 2024-04-16 NOTE — DISCHARGE NOTE PROVIDER - NSDCCPTREATMENT_GEN_ALL_CORE_FT
PRINCIPAL PROCEDURE  Procedure: Robot-assisted laparoscopic left hemicolectomy  Findings and Treatment:

## 2024-04-16 NOTE — DISCHARGE NOTE PROVIDER - HOSPITAL COURSE
64 year old male PMH HTN, HLD, CKD and anxiety, c/o constipation 1/2024, assoc with abdominal pain and rectal bleeding, for which underwent colonoscopy, diagnosed w/ a cancerous tumor in the colon. 4/12 pt same day admission for scheduled robot-assisted laparoscopic left hemicolectomy. Pt tolerated procedure well. Cardiology and medicine consulted to optimize pt post operatively. Diet slowly advanced as tolerated. When tolerating diet transitioned from IV to po medications. Per Attending pt now stable for discharge home. Pt hemodynamically stable, tolerating diet, and pain well controlled. Pt to follow up with surgeon Dr. Guillen as an outpatient, instructed to call to schedule appointment 64 year old male PMH HTN, HLD, CKD and anxiety, c/o constipation 1/2024, assoc with abdominal pain and rectal bleeding, for which underwent colonoscopy, diagnosed w/ a cancerous tumor in the colon. 4/12 pt same day admission for scheduled robot-assisted laparoscopic left hemicolectomy. Pt tolerated procedure well. Cardiology and medicine consulted to optimize pt post operatively. Diet slowly advanced as tolerated. When tolerating diet transitioned from IV to po medications. Per Attending pt now stable for discharge home. Pt hemodynamically stable, tolerating diet, and pain well controlled. Pt to follow up with surgeon Dr. Guillen as an outpatient, instructed to call to schedule appointment. Pt with Caprini score 8, being sent home with dvt ppx.

## 2024-04-16 NOTE — PROGRESS NOTE ADULT - SUBJECTIVE AND OBJECTIVE BOX
E Team Surgery Daily Progress Note    Subjective:   Patient seen at bedside this AM. Reporting no abdominal pain. Reports improvement in nausea, tolerating full liquid diet, +Flatus/  . Ambulating multiple times daily. No CP/SOB/Dyspnea reported.   24h Events:   - Overnight, no acute events    Objective:  Vital Signs  T(C): 36.8 (04-16 @ 04:50), Max: 36.9 (04-15 @ 20:49)  HR: 83 (04-16 @ 04:50) (83 - 96)  BP: 132/92 (04-16 @ 04:50) (127/74 - 139/99)  RR: 17 (04-16 @ 04:50) (17 - 18)  SpO2: 98% (04-16 @ 04:50) (97% - 100%)  04-14-24 @ 07:01  -  04-15-24 @ 07:00  --------------------------------------------------------  IN:  Total IN: 0 mL    OUT:    Voided (mL): 1900 mL  Total OUT: 1900 mL    Total NET: -1900 mL      04-15-24 @ 07:01  -  04-16-24 @ 06:08  --------------------------------------------------------  IN:  Total IN: 0 mL    OUT:    Voided (mL): 600 mL  Total OUT: 600 mL    Total NET: -600 mL          Physical Exam:  GEN: resting in bed comfortably in NAD  RESP: no increased WOB  ABD:   EXTR:   NEURO: AAOx4    Labs:                        9.8    8.95  )-----------( 324      ( 15 Apr 2024 07:07 )             31.6   04-15    141  |  103  |  18  ----------------------------<  109<H>  3.8   |  21<L>  |  1.76<H>    Ca    8.8      15 Apr 2024 07:07  Phos  2.9     04-15  Mg     2.20     04-15      CAPILLARY BLOOD GLUCOSE          Medications:   MEDICATIONS  (STANDING):  amLODIPine   Tablet 10 milliGRAM(s) Oral daily  atorvastatin 40 milliGRAM(s) Oral at bedtime  heparin   Injectable 5000 Unit(s) SubCutaneous every 8 hours  influenza   Vaccine 0.5 milliLiter(s) IntraMuscular once  metoprolol succinate ER 25 milliGRAM(s) Oral daily  nortriptyline 10 milliGRAM(s) Oral daily  pantoprazole    Tablet 40 milliGRAM(s) Oral before breakfast    MEDICATIONS  (PRN):  ondansetron Injectable 4 milliGRAM(s) IV Push every 6 hours PRN Nausea and/or Vomiting  oxyCODONE    IR 2.5 milliGRAM(s) Oral every 4 hours PRN Moderate Pain (4 - 6)  oxyCODONE    IR 5 milliGRAM(s) Oral every 4 hours PRN Severe Pain (7 - 10)      Imaging:       E Team Surgery Daily Progress Note    Subjective:   Patient seen at bedside this AM. Reporting no abdominal pain. Reports improvement in nausea, tolerating full liquid diet, +Flatus/+BM (several eps diarrhea O/N)  . Ambulating. No CP/SOB/Dyspnea reported.       Objective:  Vital Signs  T(C): 36.8 (04-16 @ 04:50), Max: 36.9 (04-15 @ 20:49)  HR: 83 (04-16 @ 04:50) (83 - 96)  BP: 132/92 (04-16 @ 04:50) (127/74 - 139/99)  RR: 17 (04-16 @ 04:50) (17 - 18)  SpO2: 98% (04-16 @ 04:50) (97% - 100%)  04-14-24 @ 07:01  -  04-15-24 @ 07:00  --------------------------------------------------------  IN:  Total IN: 0 mL    OUT:    Voided (mL): 1900 mL  Total OUT: 1900 mL    Total NET: -1900 mL      04-15-24 @ 07:01  -  04-16-24 @ 06:08  --------------------------------------------------------  IN:  Total IN: 0 mL    OUT:    Voided (mL): 600 mL  Total OUT: 600 mL    Total NET: -600 mL    Physical Exam:  GEN: resting in bed comfortably in NAD  RESP: no increased WOB  ABD:  Soft, mildly distended, appropriately tender, gauze and tape over incision c/d/i, incision healing appropriately  EXTR: grossly intact  NEURO: AAOx4        Labs:                        9.8    8.95  )-----------( 324      ( 15 Apr 2024 07:07 )             31.6   04-15    141  |  103  |  18  ----------------------------<  109<H>  3.8   |  21<L>  |  1.76<H>    Ca    8.8      15 Apr 2024 07:07  Phos  2.9     04-15  Mg     2.20     04-15      CAPILLARY BLOOD GLUCOSE          Medications:   MEDICATIONS  (STANDING):  amLODIPine   Tablet 10 milliGRAM(s) Oral daily  atorvastatin 40 milliGRAM(s) Oral at bedtime  heparin   Injectable 5000 Unit(s) SubCutaneous every 8 hours  influenza   Vaccine 0.5 milliLiter(s) IntraMuscular once  metoprolol succinate ER 25 milliGRAM(s) Oral daily  nortriptyline 10 milliGRAM(s) Oral daily  pantoprazole    Tablet 40 milliGRAM(s) Oral before breakfast    MEDICATIONS  (PRN):  ondansetron Injectable 4 milliGRAM(s) IV Push every 6 hours PRN Nausea and/or Vomiting  oxyCODONE    IR 2.5 milliGRAM(s) Oral every 4 hours PRN Moderate Pain (4 - 6)  oxyCODONE    IR 5 milliGRAM(s) Oral every 4 hours PRN Severe Pain (7 - 10)      Imaging:

## 2024-04-16 NOTE — DISCHARGE NOTE PROVIDER - CARE PROVIDER_API CALL
Mervin Guillen-Yeou  Surgery  [address] [place] [id number]  Phone: [phone]  Fax: [phone]  Follow Up Time:    Mervin Guillen-Yeou  Surgery  [address] [place] [address]  Phone: [phone]  Fax: [phone]  Follow Up Time:     Diana Cadena  Cardiovascular Disease  [address] [place] [address], [address]  [place] [address]  Phone: [phone]  Fax: [phone]  Follow Up Time:

## 2024-04-16 NOTE — DISCHARGE NOTE PROVIDER - PROVIDER TOKENS
PROVIDER:[TOKEN:[[id number]]] PROVIDER:[TOKEN:[[id number]]],PROVIDER:[TOKEN:[[id number]]]

## 2024-04-16 NOTE — PROGRESS NOTE ADULT - SUBJECTIVE AND OBJECTIVE BOX
Date of service 4/16/24    reports +BM, no N/V, ambulating    Review of Systems:   Constitutional: [ ] fevers, [ ] chills.   Skin: [ ] dry skin. [ ] rashes.  Psychiatric: [ ] depression, [ ] anxiety.   Gastrointestinal: [ ] BRBPR, [ ] melena.   Neurological: [ ] confusion. [ ] seizures. [ ] shuffling gait.   Ears,Nose,Mouth and Throat: [ ] ear pain [ ] sore throat.   Eyes: [ ] diplopia.   Respiratory: [ ] hemoptysis. [ ] shortness of breath  Cardiovascular: See HPI above  Hematologic/Lymphatic: [ ] anemia. [ ] painful nodes. [ ] prolonged bleeding.   Genitourinary: [ ] hematuria. [ ] flank pain.   Endocrine: [ ] significant change in weight. [ ] intolerance to heat and cold.     Review of systems [x ] otherwise negative, [ ] otherwise unable to obtain    FH: no family history of sudden cardiac death in first degree relatives    SH: [ ] tobacco, [ ] alcohol, [ ] drugs    acetaminophen     Tablet .. 975 milliGRAM(s) Oral every 6 hours  amLODIPine   Tablet 10 milliGRAM(s) Oral daily  atorvastatin 40 milliGRAM(s) Oral at bedtime  heparin   Injectable 5000 Unit(s) SubCutaneous every 8 hours  influenza   Vaccine 0.5 milliLiter(s) IntraMuscular once  metoprolol succinate ER 25 milliGRAM(s) Oral daily  nortriptyline 10 milliGRAM(s) Oral daily  ondansetron Injectable 4 milliGRAM(s) IV Push every 6 hours PRN  oxyCODONE    IR 2.5 milliGRAM(s) Oral every 4 hours PRN  oxyCODONE    IR 5 milliGRAM(s) Oral every 4 hours PRN  pantoprazole    Tablet 40 milliGRAM(s) Oral before breakfast                            10.5   9.64  )-----------( 370      ( 16 Apr 2024 07:03 )             34.5       04-16    139  |  100  |  24<H>  ----------------------------<  100<H>  3.8   |  26  |  1.78<H>    Ca    9.5      16 Apr 2024 07:03  Phos  3.2     04-16  Mg     2.30     04-16        T(C): 36.7 (04-16-24 @ 12:05), Max: 36.9 (04-15-24 @ 20:49)  HR: 72 (04-16-24 @ 12:05) (72 - 87)  BP: 124/80 (04-16-24 @ 12:05) (124/80 - 139/99)  RR: 18 (04-16-24 @ 12:05) (17 - 18)  SpO2: 99% (04-16-24 @ 12:05) (97% - 100%)  Wt(kg): --    I&O's Summary    15 Apr 2024 07:01  -  16 Apr 2024 07:00  --------------------------------------------------------  IN: 350 mL / OUT: 600 mL / NET: -250 mL    16 Apr 2024 07:01  -  16 Apr 2024 12:31  --------------------------------------------------------  IN: 240 mL / OUT: 0 mL / NET: 240 mL      General: Well nourished in no acute distress. Alert and Oriented * 3.   Head: Normocephalic and atraumatic.   Neck: No JVD. No bruits. Supple. Does not appear to be enlarged.   Cardiovascular: + S1,S2 ; RRR Soft systolic murmur at the left lower sternal border. No rubs noted.    Lungs: CTA b/l. No rhonchi, rales or wheezes.   Abdomen: + BS, soft. Non tender. Non distended. No rebound. No guarding.   Extremities: No clubbing/cyanosis/edema.   Neurologic: Moves all four extremities. Full range of motion.   Skin: Warm and moist. The patient's skin has normal elasticity and good skin turgor.   Psychiatric: Appropriate mood and affect.  Musculoskeletal: Normal range of motion, normal strength  	  ASSESSMENT/PLAN: 	  64 year old male PMH HTN, HLD, CKD (last creatinine 1.74 on 3/8/24) and anxiety, with last NST 11/2023 with no ischemia or infarct and last TTE 11/2023 with Normal LV/RV function, who was recently dx with colon cancer now s/p left hemicolectomy.     --tolerated procedure well from CV perspective  --BP HR stable, cont Toprol and Norvasc  --resume Losartan and Chlorthalidone at D/C  --post op care per surgery  --F/U with Dr Murray as scheduled, [phone]      Eden BOB  [phone]

## 2024-04-16 NOTE — PROGRESS NOTE ADULT - ASSESSMENT
64y Male s/p robo-assited lap L hemicolectomy (4/12).  Recovering appropriately after Surgery.     Plan:  -Full liquids, keep IVF  -Continue home meds, Hold Losartan (d/t elevated Cr on 4/14)  -Pain control: tylenol q6,  oxy prn  (dc'd toradol)  -Monitor Creatinine and urine output  -OOB/AAT  -Strict I&O's  - Aquacel dc'd 4/14 am d/t leak, replaced with gauze+tape      E Team Surgery,  [id number]   Assessment:  64y Male s/p robo-assited lap L hemicolectomy (4/12).  Recovering appropriately after Surgery.     Plan:  -FLD  -Continue home meds, Hold Losartan (d/t elevated Cr on 4/14)  -Pain control: tylenol q6,  oxy prn  (dc'd toradol)  -Monitor Creatinine and urine output  -OOB/AAT  -Strict I&O's  - Aquacel dc'd 4/14 am d/t leak, replaced with gauze+tape      E Team Surgery,  [id number]   Assessment:  64y Male s/p robo-assited lap L hemicolectomy (4/12).  Recovering appropriately after Surgery.     Plan:  -LRD  -Continue home meds, Hold Losartan (d/t elevated Cr on 4/14)  -Pain control: tylenol q6,  oxy prn  (dc'd toradol)  -Monitor Creatinine and urine output  -OOB/AAT  -Strict I&O's  - Aquacel dc'd 4/14 am d/t leak, replaced with gauze+tape      E Team Surgery,  [id number]

## 2024-04-16 NOTE — DISCHARGE NOTE PROVIDER - NSDCCPCAREPLAN_GEN_ALL_CORE_FT
PRINCIPAL DISCHARGE DIAGNOSIS  Diagnosis: Malignant neoplasm of colon  Assessment and Plan of Treatment: WOUND CARE:  Please keep incisions clean and dry. Please do not Scrub or rub incisions. Do not use lotion or powder on incisions.   BATHING: You may shower and/or sponge bathe. You may use warm soapy water in the shower and rinse, pat dry.  ACTIVITY: No heavy lifting or straining. Otherwise, you may return to your usual level of physical activity. If you are taking narcotic pain medication DO NOT drive a car, operate machinery or make important decisions.  DIET: Return to your usual diet.  NOTIFY YOUR SURGEON IF YOU HAVE: any bleeding that does not stop, any pus draining from your wound(s), any fever (over 100.4 F) persistent nausea/vomiting, or if your pain is not controlled on your discharge pain medications, unable to urinate.  FOLLOW UP:  1. Please follow up with your primary care physician in one week regarding your hospitalization, bring copies of your discharge paperwork.  2. Please follow up with your surgeon, Dr. Guillen as an outpatient, please call [phone] to schedule appointment        PRINCIPAL DISCHARGE DIAGNOSIS  Diagnosis: Malignant neoplasm of colon  Assessment and Plan of Treatment: WOUND CARE:  Please keep incisions clean and dry. Please do not Scrub or rub incisions. Do not use lotion or powder on incisions.   BATHING: You may shower and/or sponge bathe. You may use warm soapy water in the shower and rinse, pat dry.  ACTIVITY: No heavy lifting or straining. Otherwise, you may return to your usual level of physical activity. If you are taking narcotic pain medication DO NOT drive a car, operate machinery or make important decisions.  DIET: Return to your usual diet.  NOTIFY YOUR SURGEON IF YOU HAVE: any bleeding that does not stop, any pus draining from your wound(s), any fever (over 100.4 F) persistent nausea/vomiting, or if your pain is not controlled on your discharge pain medications, unable to urinate.  FOLLOW UP:  1. Please follow up with your primary care physician in one week regarding your hospitalization, bring copies of your discharge paperwork.  2. Please follow up with your cardiologist as a outpatient, please call to schedule appointment. While in the hospital you home medicatiosnwere slowly restarted.At this time your home dose of losartan is still being held, please follow up with your cardiologist to determine when to restart  3. Please follow up with your surgeon, Dr. Guillen as an outpatient, please call [phone] to schedule appointment        PRINCIPAL DISCHARGE DIAGNOSIS  Diagnosis: Malignant neoplasm of colon  Assessment and Plan of Treatment: WOUND CARE:  Please keep incisions clean and dry. Please do not Scrub or rub incisions. Do not use lotion or powder on incisions.   BATHING: You may shower and/or sponge bathe. You may use warm soapy water in the shower and rinse, pat dry.  ACTIVITY: No heavy lifting or straining. Otherwise, you may return to your usual level of physical activity. If you are taking narcotic pain medication DO NOT drive a car, operate machinery or make important decisions.  DIET: Return to your usual diet.  NOTIFY YOUR SURGEON IF YOU HAVE: any bleeding that does not stop, any pus draining from your wound(s), any fever (over 100.4 F) persistent nausea/vomiting, or if your pain is not controlled on your discharge pain medications, unable to urinate.  FOLLOW UP:  1. Please follow up with your primary care physician in one week regarding your hospitalization, bring copies of your discharge paperwork.  2. Please follow up with your cardiologist as a outpatient, please call to schedule appointment. While in the hospital you home medicatiosnwere slowly restarted.At this time your home dose of losartan is still being held, please follow up with your cardiologist to determine when to restart  3. Please follow up with your surgeon, Dr. Guillen as an outpatient, please call [phone] to schedule appointment   You were prescribed a low dose of the medication called Eliquis (Apixaban). This is a blood thinner used to prevent the formation of blood clot (DVT) after surgery. Continue to take this medication (1 tab, twice a day) until it is finished.   If you develop any signs of bleeding (bloody nose, vomiting blood, blood in stool or urine) STOP this medication and call to notify Dr. Guillen's office. If you develop lightheadedness, dizziness, loss of consciousness in addition to signs of bleeding above, STOP this medication and seek immediate medical attention.  Limit NSAID use while on this medication as they both increase bleeding risk.       PRINCIPAL DISCHARGE DIAGNOSIS  Diagnosis: Malignant neoplasm of colon  Assessment and Plan of Treatment: WOUND CARE:  Please keep incisions clean and dry. Please do not Scrub or rub incisions. Do not use lotion or powder on incisions.   BATHING: You may shower and/or sponge bathe. You may use warm soapy water in the shower and rinse, pat dry.  ACTIVITY: No heavy lifting or straining. Otherwise, you may return to your usual level of physical activity. If you are taking narcotic pain medication DO NOT drive a car, operate machinery or make important decisions.  DIET: Return to your low fiber diet.  NOTIFY YOUR SURGEON IF YOU HAVE: any bleeding that does not stop, any pus draining from your wound(s), any fever (over 100.4 F) persistent nausea/vomiting, or if your pain is not controlled on your discharge pain medications, unable to urinate.  FOLLOW UP:  1. Please follow up with your primary care physician in one week regarding your hospitalization, bring copies of your discharge paperwork.  2. Please follow up with your cardiologist as a outpatient, please call to schedule appointment. While in the hospital you home medicatiosnwere slowly restarted.At this time your home dose of losartan is still being held, please follow up with your cardiologist to determine when to restart  3. Please follow up with your surgeon, Dr. Guillen as an outpatient, please call [phone] to schedule appointment   You were prescribed a low dose of the medication called Eliquis (Apixaban). This is a blood thinner used to prevent the formation of blood clot (DVT) after surgery. Continue to take this medication (1 tab, twice a day) until it is finished.   If you develop any signs of bleeding (bloody nose, vomiting blood, blood in stool or urine) STOP this medication and call to notify Dr. Guillen's office. If you develop lightheadedness, dizziness, loss of consciousness in addition to signs of bleeding above, STOP this medication and seek immediate medical attention.  Limit NSAID use while on this medication as they both increase bleeding risk.

## 2024-04-17 LAB
ANION GAP SERPL CALC-SCNC: 14 MMOL/L — SIGNIFICANT CHANGE UP (ref 7–14)
BUN SERPL-MCNC: 24 MG/DL — HIGH (ref 7–23)
CALCIUM SERPL-MCNC: 9 MG/DL — SIGNIFICANT CHANGE UP (ref 8.4–10.5)
CHLORIDE SERPL-SCNC: 100 MMOL/L — SIGNIFICANT CHANGE UP (ref 98–107)
CO2 SERPL-SCNC: 24 MMOL/L — SIGNIFICANT CHANGE UP (ref 22–31)
CREAT SERPL-MCNC: 1.69 MG/DL — HIGH (ref 0.5–1.3)
EGFR: 45 ML/MIN/1.73M2 — LOW
GLUCOSE SERPL-MCNC: 87 MG/DL — SIGNIFICANT CHANGE UP (ref 70–99)
HCT VFR BLD CALC: 30.6 % — LOW (ref 39–50)
HGB BLD-MCNC: 9.6 G/DL — LOW (ref 13–17)
MAGNESIUM SERPL-MCNC: 2 MG/DL — SIGNIFICANT CHANGE UP (ref 1.6–2.6)
MCHC RBC-ENTMCNC: 24.5 PG — LOW (ref 27–34)
MCHC RBC-ENTMCNC: 31.4 GM/DL — LOW (ref 32–36)
MCV RBC AUTO: 78.1 FL — LOW (ref 80–100)
NRBC # BLD: 0 /100 WBCS — SIGNIFICANT CHANGE UP (ref 0–0)
NRBC # FLD: 0 K/UL — SIGNIFICANT CHANGE UP (ref 0–0)
PHOSPHATE SERPL-MCNC: 2.9 MG/DL — SIGNIFICANT CHANGE UP (ref 2.5–4.5)
PLATELET # BLD AUTO: 309 K/UL — SIGNIFICANT CHANGE UP (ref 150–400)
POTASSIUM SERPL-MCNC: 3.7 MMOL/L — SIGNIFICANT CHANGE UP (ref 3.5–5.3)
POTASSIUM SERPL-SCNC: 3.7 MMOL/L — SIGNIFICANT CHANGE UP (ref 3.5–5.3)
RBC # BLD: 3.92 M/UL — LOW (ref 4.2–5.8)
RBC # FLD: 14.2 % — SIGNIFICANT CHANGE UP (ref 10.3–14.5)
SODIUM SERPL-SCNC: 138 MMOL/L — SIGNIFICANT CHANGE UP (ref 135–145)
WBC # BLD: 8.38 K/UL — SIGNIFICANT CHANGE UP (ref 3.8–10.5)
WBC # FLD AUTO: 8.38 K/UL — SIGNIFICANT CHANGE UP (ref 3.8–10.5)

## 2024-04-17 RX ORDER — PANTOPRAZOLE SODIUM 20 MG/1
40 TABLET, DELAYED RELEASE ORAL ONCE
Refills: 0 | Status: COMPLETED | OUTPATIENT
Start: 2024-04-17 | End: 2024-04-17

## 2024-04-17 RX ADMIN — HEPARIN SODIUM 5000 UNIT(S): 5000 INJECTION INTRAVENOUS; SUBCUTANEOUS at 06:41

## 2024-04-17 RX ADMIN — Medication 975 MILLIGRAM(S): at 23:40

## 2024-04-17 RX ADMIN — Medication 25 MILLIGRAM(S): at 06:41

## 2024-04-17 RX ADMIN — Medication 975 MILLIGRAM(S): at 23:10

## 2024-04-17 RX ADMIN — Medication 975 MILLIGRAM(S): at 11:12

## 2024-04-17 RX ADMIN — Medication 975 MILLIGRAM(S): at 12:00

## 2024-04-17 RX ADMIN — HEPARIN SODIUM 5000 UNIT(S): 5000 INJECTION INTRAVENOUS; SUBCUTANEOUS at 22:32

## 2024-04-17 RX ADMIN — AMLODIPINE BESYLATE 10 MILLIGRAM(S): 2.5 TABLET ORAL at 06:41

## 2024-04-17 RX ADMIN — NORTRIPTYLINE HYDROCHLORIDE 10 MILLIGRAM(S): 10 CAPSULE ORAL at 11:12

## 2024-04-17 RX ADMIN — ATORVASTATIN CALCIUM 40 MILLIGRAM(S): 80 TABLET, FILM COATED ORAL at 22:32

## 2024-04-17 RX ADMIN — Medication 975 MILLIGRAM(S): at 17:37

## 2024-04-17 RX ADMIN — HEPARIN SODIUM 5000 UNIT(S): 5000 INJECTION INTRAVENOUS; SUBCUTANEOUS at 13:55

## 2024-04-17 RX ADMIN — Medication 975 MILLIGRAM(S): at 18:20

## 2024-04-17 RX ADMIN — PANTOPRAZOLE SODIUM 40 MILLIGRAM(S): 20 TABLET, DELAYED RELEASE ORAL at 06:41

## 2024-04-17 RX ADMIN — PANTOPRAZOLE SODIUM 40 MILLIGRAM(S): 20 TABLET, DELAYED RELEASE ORAL at 22:32

## 2024-04-17 NOTE — PROGRESS NOTE ADULT - SUBJECTIVE AND OBJECTIVE BOX
Name of Patient : CATHRYN AYALA  MRN: [id number]  Date of visit: 04-18-24       Subjective: Patient seen and examined. No new events except as noted.     REVIEW OF SYSTEMS:    CONSTITUTIONAL: No weakness, fevers or chills  EYES/ENT: No visual changes;  No vertigo or throat pain   NECK: No pain or stiffness  RESPIRATORY: No cough, wheezing, hemoptysis; No shortness of breath  CARDIOVASCULAR: No chest pain or palpitations  GASTROINTESTINAL: No abdominal or epigastric pain. No nausea, vomiting, or hematemesis; No diarrhea or constipation. No melena or hematochezia.  GENITOURINARY: No dysuria, frequency or hematuria  NEUROLOGICAL: No numbness or weakness  SKIN: No itching, burning, rashes, or lesions   All other review of systems is negative unless indicated above.    MEDICATIONS:  MEDICATIONS  (STANDING):  acetaminophen     Tablet .. 975 milliGRAM(s) Oral every 6 hours  amLODIPine   Tablet 10 milliGRAM(s) Oral daily  atorvastatin 40 milliGRAM(s) Oral at bedtime  heparin   Injectable 5000 Unit(s) SubCutaneous every 8 hours  influenza   Vaccine 0.5 milliLiter(s) IntraMuscular once  metoprolol succinate ER 25 milliGRAM(s) Oral daily  nortriptyline 10 milliGRAM(s) Oral daily  pantoprazole    Tablet 40 milliGRAM(s) Oral before breakfast      PHYSICAL EXAM:  T(C): 36.7 (04-18-24 @ 08:15), Max: 36.8 (04-18-24 @ 00:03)  HR: 95 (04-18-24 @ 08:15) (80 - 95)  BP: 140/81 (04-18-24 @ 08:15) (137/73 - 140/81)  RR: 20 (04-18-24 @ 08:15) (18 - 20)  SpO2: 100% (04-18-24 @ 08:15) (98% - 100%)  Wt(kg): --  I&O's Summary    17 Apr 2024 07:01  -  18 Apr 2024 07:00  --------------------------------------------------------  IN: 1420 mL / OUT: 1350 mL / NET: 70 mL    18 Apr 2024 07:01  -  18 Apr 2024 20:52  --------------------------------------------------------  IN: 460 mL / OUT: 300 mL / NET: 160 mL          Appearance: Normal	  HEENT:  PERRLA   Lymphatic: No lymphadenopathy   Cardiovascular: Normal S1 S2, no JVD  Respiratory: normal effort , clear  Gastrointestinal:  Soft, Non-tender  Skin: No rashes,  warm to touch  Psychiatry:  Mood & affect appropriate  Musculuskeletal: No edema    recent labs, Imaging and EKGs personally reviewed             04-17-24 @ 07:01  -  04-18-24 @ 07:00  --------------------------------------------------------  IN: 1420 mL / OUT: 1350 mL / NET: 70 mL    04-18-24 @ 07:01  -  04-18-24 @ 20:52  --------------------------------------------------------  IN: 460 mL / OUT: 300 mL / NET: 160 mL

## 2024-04-17 NOTE — PROGRESS NOTE ADULT - SUBJECTIVE AND OBJECTIVE BOX
Date of service 4/17/24    tolerating PO, having BMs, and ambulating, but reports indigestion after eating    Review of Systems:   Constitutional: [ ] fevers, [ ] chills.   Skin: [ ] dry skin. [ ] rashes.  Psychiatric: [ ] depression, [ ] anxiety.   Gastrointestinal: [ ] BRBPR, [ ] melena.   Neurological: [ ] confusion. [ ] seizures. [ ] shuffling gait.   Ears,Nose,Mouth and Throat: [ ] ear pain [ ] sore throat.   Eyes: [ ] diplopia.   Respiratory: [ ] hemoptysis. [ ] shortness of breath  Cardiovascular: See HPI above  Hematologic/Lymphatic: [ ] anemia. [ ] painful nodes. [ ] prolonged bleeding.   Genitourinary: [ ] hematuria. [ ] flank pain.   Endocrine: [ ] significant change in weight. [ ] intolerance to heat and cold.     Review of systems [ x] otherwise negative, [ ] otherwise unable to obtain    FH: no family history of sudden cardiac death in first degree relatives    SH: [ ] tobacco, [ ] alcohol, [ ] drugs    acetaminophen     Tablet .. 975 milliGRAM(s) Oral every 6 hours  amLODIPine   Tablet 10 milliGRAM(s) Oral daily  atorvastatin 40 milliGRAM(s) Oral at bedtime  heparin   Injectable 5000 Unit(s) SubCutaneous every 8 hours  influenza   Vaccine 0.5 milliLiter(s) IntraMuscular once  metoprolol succinate ER 25 milliGRAM(s) Oral daily  nortriptyline 10 milliGRAM(s) Oral daily  ondansetron Injectable 4 milliGRAM(s) IV Push every 6 hours PRN  oxyCODONE    IR 2.5 milliGRAM(s) Oral every 4 hours PRN  oxyCODONE    IR 5 milliGRAM(s) Oral every 4 hours PRN  pantoprazole    Tablet 40 milliGRAM(s) Oral before breakfast                            9.6    8.38  )-----------( 309      ( 17 Apr 2024 06:16 )             30.6       04-17    138  |  100  |  24<H>  ----------------------------<  87  3.7   |  24  |  1.69<H>    Ca    9.0      17 Apr 2024 06:16  Phos  2.9     04-17  Mg     2.00     04-17      T(C): 36.9 (04-17-24 @ 12:23), Max: 37.1 (04-17-24 @ 04:10)  HR: 94 (04-17-24 @ 12:23) (76 - 94)  BP: 139/86 (04-17-24 @ 12:23) (112/78 - 139/86)  RR: 18 (04-17-24 @ 12:23) (18 - 19)  SpO2: 99% (04-17-24 @ 12:23) (97% - 99%)  Wt(kg): --    I&O's Summary    16 Apr 2024 07:01  -  17 Apr 2024 07:00  --------------------------------------------------------  IN: 1040 mL / OUT: 0 mL / NET: 1040 mL    17 Apr 2024 07:01  -  17 Apr 2024 13:15  --------------------------------------------------------  IN: 420 mL / OUT: 500 mL / NET: -80 mL    General: Well nourished in no acute distress. Alert and Oriented * 3.   Head: Normocephalic and atraumatic.   Neck: No JVD. No bruits. Supple. Does not appear to be enlarged.   Cardiovascular: + S1,S2 ; RRR Soft systolic murmur at the left lower sternal border. No rubs noted.    Lungs: CTA b/l. No rhonchi, rales or wheezes.   Abdomen: + BS, soft. Non tender. Non distended. No rebound. No guarding.   Extremities: No clubbing/cyanosis/edema.   Neurologic: Moves all four extremities. Full range of motion.   Skin: Warm and moist. The patient's skin has normal elasticity and good skin turgor.   Psychiatric: Appropriate mood and affect.  Musculoskeletal: Normal range of motion, normal strength  	  ASSESSMENT/PLAN: 	  64 year old male PMH HTN, HLD, CKD (last creatinine 1.74 on 3/8/24) and anxiety, with last NST 11/2023 with no ischemia or infarct and last TTE 11/2023 with Normal LV/RV function, who was recently dx with colon cancer now s/p left hemicolectomy.     --tolerated procedure well from CV perspective  --BP HR stable, cont Toprol and Norvasc  --resume Losartan and Chlorthalidone at D/C  --post op care per surgery  --F/U with Dr Murray as scheduled, [phone]

## 2024-04-17 NOTE — PROGRESS NOTE ADULT - SUBJECTIVE AND OBJECTIVE BOX
E Team Surgery Daily Progress Note  =====================================================    SUBJECTIVE: Patient seen and examined at bedside on AM rounds. Patient states tolerated lunch yesterday but then later in the afternoon developed nausea, was still able to eat small amount dinner and tolerated. Pt notes improvement nausea this morning. Pt +multiple episodes diarrhea, has not passed flatus this morning, had small amount yesterday.     ALLERGIES:  No Known Allergies      --------------------------------------------------------------------------------------    MEDICATIONS:    Neurologic Medications  acetaminophen     Tablet .. 975 milliGRAM(s) Oral every 6 hours  nortriptyline 10 milliGRAM(s) Oral daily  ondansetron Injectable 4 milliGRAM(s) IV Push every 6 hours PRN Nausea and/or Vomiting  oxyCODONE    IR 2.5 milliGRAM(s) Oral every 4 hours PRN Moderate Pain (4 - 6)  oxyCODONE    IR 5 milliGRAM(s) Oral every 4 hours PRN Severe Pain (7 - 10)    Respiratory Medications    Cardiovascular Medications  amLODIPine   Tablet 10 milliGRAM(s) Oral daily  metoprolol succinate ER 25 milliGRAM(s) Oral daily    Gastrointestinal Medications  pantoprazole    Tablet 40 milliGRAM(s) Oral before breakfast    Genitourinary Medications    Hematologic/Oncologic Medications  heparin   Injectable 5000 Unit(s) SubCutaneous every 8 hours  influenza   Vaccine 0.5 milliLiter(s) IntraMuscular once    Antimicrobial/Immunologic Medications    Endocrine/Metabolic Medications  atorvastatin 40 milliGRAM(s) Oral at bedtime    Topical/Other Medications    --------------------------------------------------------------------------------------    VITAL SIGNS:  T(C): 37.1 (04-17-24 @ 04:10), Max: 37.1 (04-17-24 @ 04:10)  HR: 86 (04-17-24 @ 04:10) (72 - 86)  BP: 126/88 (04-17-24 @ 04:10) (112/78 - 138/83)  RR: 18 (04-17-24 @ 04:10) (18 - 18)  SpO2: 97% (04-17-24 @ 04:10) (97% - 99%)  --------------------------------------------------------------------------------------    INS AND OUTS:    04-16-24 @ 07:01  -  04-17-24 @ 07:00  --------------------------------------------------------  IN: 1040 mL / OUT: 0 mL / NET: 1040 mL      --------------------------------------------------------------------------------------  Physical Exam:  GEN: resting in bed comfortably in NAD  RESP: no increased WOB  ABD:  Soft, mildly distended, appropriately tender, gauze and tape over incision c/d/i, incision healing appropriately  EXTR: grossly intact  NEURO: AAOx4    --------------------------------------------------------------------------------------    LABS

## 2024-04-17 NOTE — PROGRESS NOTE ADULT - ASSESSMENT
Assessment:  64y Male s/p robo-assited lap L hemicolectomy (4/12).  Recovering appropriately after Surgery.     Plan:  -LRD  -Continue home meds, per cards restart Losartan and Chlorthalidone at D/C  -Pain control: tylenol q6,  oxy prn    -Monitor Creatinine and urine output  -OOB/AAT  -Strict I&O's    E Team Surgery,  [id number]

## 2024-04-17 NOTE — PROGRESS NOTE ADULT - ASSESSMENT
Patient is a 64 year old male PMH HTN, HLD, CKD (last creatinine 1.74 on 3/8/24) and anxiety, with last NST 11/2023 with no ischemia or infarct and last TTE 11/2023 with Normal LV/RV function, who was recently dx with colon cancer now s/p left hemicolectomy.       # S/P L Hemicolectomy   surg care appreciated   Diet as tolerated   monitor electrolytes    # Anemia  Monitor hgb level   transfuse to keep hgb above 7     # HTN/HLD  resume oral meds as tolertaed   lipid panel  statin     # CKD  monitor renal function  hydration  monitor uribne output     DVT and GI PPX

## 2024-04-18 ENCOUNTER — TRANSCRIPTION ENCOUNTER (OUTPATIENT)
Age: 64
End: 2024-04-18

## 2024-04-18 VITALS
TEMPERATURE: 98 F | HEART RATE: 95 BPM | SYSTOLIC BLOOD PRESSURE: 140 MMHG | OXYGEN SATURATION: 100 % | RESPIRATION RATE: 20 BRPM | DIASTOLIC BLOOD PRESSURE: 81 MMHG

## 2024-04-18 LAB
ANION GAP SERPL CALC-SCNC: 16 MMOL/L — HIGH (ref 7–14)
BUN SERPL-MCNC: 23 MG/DL — SIGNIFICANT CHANGE UP (ref 7–23)
CALCIUM SERPL-MCNC: 9.4 MG/DL — SIGNIFICANT CHANGE UP (ref 8.4–10.5)
CHLORIDE SERPL-SCNC: 98 MMOL/L — SIGNIFICANT CHANGE UP (ref 98–107)
CO2 SERPL-SCNC: 22 MMOL/L — SIGNIFICANT CHANGE UP (ref 22–31)
CREAT SERPL-MCNC: 1.66 MG/DL — HIGH (ref 0.5–1.3)
EGFR: 46 ML/MIN/1.73M2 — LOW
GLUCOSE SERPL-MCNC: 86 MG/DL — SIGNIFICANT CHANGE UP (ref 70–99)
HCT VFR BLD CALC: 32.8 % — LOW (ref 39–50)
HGB BLD-MCNC: 10.3 G/DL — LOW (ref 13–17)
MAGNESIUM SERPL-MCNC: 1.9 MG/DL — SIGNIFICANT CHANGE UP (ref 1.6–2.6)
MCHC RBC-ENTMCNC: 24.4 PG — LOW (ref 27–34)
MCHC RBC-ENTMCNC: 31.4 GM/DL — LOW (ref 32–36)
MCV RBC AUTO: 77.7 FL — LOW (ref 80–100)
NRBC # BLD: 0 /100 WBCS — SIGNIFICANT CHANGE UP (ref 0–0)
NRBC # FLD: 0 K/UL — SIGNIFICANT CHANGE UP (ref 0–0)
PHOSPHATE SERPL-MCNC: 3.1 MG/DL — SIGNIFICANT CHANGE UP (ref 2.5–4.5)
PLATELET # BLD AUTO: 365 K/UL — SIGNIFICANT CHANGE UP (ref 150–400)
POTASSIUM SERPL-MCNC: 4.3 MMOL/L — SIGNIFICANT CHANGE UP (ref 3.5–5.3)
POTASSIUM SERPL-SCNC: 4.3 MMOL/L — SIGNIFICANT CHANGE UP (ref 3.5–5.3)
RBC # BLD: 4.22 M/UL — SIGNIFICANT CHANGE UP (ref 4.2–5.8)
RBC # FLD: 14.3 % — SIGNIFICANT CHANGE UP (ref 10.3–14.5)
SODIUM SERPL-SCNC: 136 MMOL/L — SIGNIFICANT CHANGE UP (ref 135–145)
WBC # BLD: 9.43 K/UL — SIGNIFICANT CHANGE UP (ref 3.8–10.5)
WBC # FLD AUTO: 9.43 K/UL — SIGNIFICANT CHANGE UP (ref 3.8–10.5)

## 2024-04-18 RX ORDER — ALLOPURINOL 300 MG
1 TABLET ORAL
Refills: 0 | DISCHARGE

## 2024-04-18 RX ADMIN — PANTOPRAZOLE SODIUM 40 MILLIGRAM(S): 20 TABLET, DELAYED RELEASE ORAL at 05:28

## 2024-04-18 RX ADMIN — AMLODIPINE BESYLATE 10 MILLIGRAM(S): 2.5 TABLET ORAL at 05:28

## 2024-04-18 RX ADMIN — Medication 975 MILLIGRAM(S): at 05:27

## 2024-04-18 RX ADMIN — Medication 25 MILLIGRAM(S): at 05:28

## 2024-04-18 RX ADMIN — Medication 975 MILLIGRAM(S): at 05:57

## 2024-04-18 RX ADMIN — HEPARIN SODIUM 5000 UNIT(S): 5000 INJECTION INTRAVENOUS; SUBCUTANEOUS at 05:28

## 2024-04-18 NOTE — DISCHARGE NOTE NURSING/CASE MANAGEMENT/SOCIAL WORK - PATIENT PORTAL LINK FT
You can access the FollowMyHealth Patient Portal offered by [facility] by registering at the following website: [email or web address]. By joining [facility]’s FollowMyHealth portal, you will also be able to view your health information using other applications (apps) compatible with our system.

## 2024-04-18 NOTE — PROGRESS NOTE ADULT - PROVIDER SPECIALTY LIST ADULT
Cardiology
Cardiology
Internal Medicine
Internal Medicine
Surgery
Anesthesia
Cardiology
Surgery
Internal Medicine

## 2024-04-18 NOTE — PROGRESS NOTE ADULT - NS ATTEND AMEND GEN_ALL_CORE FT
ambulating well. good appetite.  no flatus yet.  no SOB on exertion, no leg cramping/pain.  BP well controlled on losartan.  will follow with you.
flatus + bowel movements.  feeling better overall now.  continues to ambulate well, with no chest or leg cramping or pain.  awaiting timing for discharge.
having some acid reflux and diarrhea, but no vomiting, no abd pain.  feeling overall better since surgery.
doing well recovering from colorectal surgery. no changes in above plan of care. will follow with you.
having expected incisional pain and some splinting with deep breathing post op.  no leg pain/cramping, no angina with exertion.  has been up walking in the hallway. no flatus yet.  overall in good spirits.  will follow with you.
frustrated by lack of flatus.  a bit nauseated.  no angina or palpitations, no leg cramping.  ambulating better every day.

## 2024-04-18 NOTE — PROGRESS NOTE ADULT - SUBJECTIVE AND OBJECTIVE BOX
Date of service 4/18/24    no pain or SOB, awaiting DC    Review of Systems:   Constitutional: [ ] fevers, [ ] chills.   Skin: [ ] dry skin. [ ] rashes.  Psychiatric: [ ] depression, [ ] anxiety.   Gastrointestinal: [ ] BRBPR, [ ] melena.   Neurological: [ ] confusion. [ ] seizures. [ ] shuffling gait.   Ears,Nose,Mouth and Throat: [ ] ear pain [ ] sore throat.   Eyes: [ ] diplopia.   Respiratory: [ ] hemoptysis. [ ] shortness of breath  Cardiovascular: See HPI above  Hematologic/Lymphatic: [ ] anemia. [ ] painful nodes. [ ] prolonged bleeding.   Genitourinary: [ ] hematuria. [ ] flank pain.   Endocrine: [ ] significant change in weight. [ ] intolerance to heat and cold.     Review of systems [ x] otherwise negative, [ ] otherwise unable to obtain    FH: no family history of sudden cardiac death in first degree relatives    SH: [ ] tobacco, [ ] alcohol, [ ] drugs    acetaminophen     Tablet .. 975 milliGRAM(s) Oral every 6 hours  amLODIPine   Tablet 10 milliGRAM(s) Oral daily  atorvastatin 40 milliGRAM(s) Oral at bedtime  heparin   Injectable 5000 Unit(s) SubCutaneous every 8 hours  influenza   Vaccine 0.5 milliLiter(s) IntraMuscular once  metoprolol succinate ER 25 milliGRAM(s) Oral daily  nortriptyline 10 milliGRAM(s) Oral daily  ondansetron Injectable 4 milliGRAM(s) IV Push every 6 hours PRN  oxyCODONE    IR 2.5 milliGRAM(s) Oral every 4 hours PRN  oxyCODONE    IR 5 milliGRAM(s) Oral every 4 hours PRN  pantoprazole    Tablet 40 milliGRAM(s) Oral before breakfast                            10.3   9.43  )-----------( 365      ( 18 Apr 2024 06:43 )             32.8       04-18    136  |  98  |  23  ----------------------------<  86  4.3   |  22  |  1.66<H>    Ca    9.4      18 Apr 2024 06:43  Phos  3.1     04-18  Mg     1.90     04-18      T(C): 36.7 (04-18-24 @ 08:15), Max: 36.9 (04-17-24 @ 12:23)  HR: 95 (04-18-24 @ 08:15) (80 - 95)  BP: 140/81 (04-18-24 @ 08:15) (135/88 - 144/89)  RR: 20 (04-18-24 @ 08:15) (18 - 20)  SpO2: 100% (04-18-24 @ 08:15) (97% - 100%)  Wt(kg): --    I&O's Summary    17 Apr 2024 07:01  -  18 Apr 2024 07:00  --------------------------------------------------------  IN: 1420 mL / OUT: 1350 mL / NET: 70 mL    18 Apr 2024 07:01  -  18 Apr 2024 11:55  --------------------------------------------------------  IN: 460 mL / OUT: 300 mL / NET: 160 mL    General: Well nourished in no acute distress. Alert and Oriented * 3.   Head: Normocephalic and atraumatic.   Neck: No JVD. No bruits. Supple. Does not appear to be enlarged.   Cardiovascular: + S1,S2 ; RRR Soft systolic murmur at the left lower sternal border. No rubs noted.    Lungs: CTA b/l. No rhonchi, rales or wheezes.   Abdomen: + BS, soft. Non tender. Non distended. No rebound. No guarding.   Extremities: No clubbing/cyanosis/edema.   Neurologic: Moves all four extremities. Full range of motion.   Skin: Warm and moist. The patient's skin has normal elasticity and good skin turgor.   Psychiatric: Appropriate mood and affect.  Musculoskeletal: Normal range of motion, normal strength  	  ASSESSMENT/PLAN: 	  64 year old male PMH HTN, HLD, CKD (last creatinine 1.74 on 3/8/24) and anxiety, with last NST 11/2023 with no ischemia or infarct and last TTE 11/2023 with Normal LV/RV function, who was recently dx with colon cancer now s/p left hemicolectomy.     --tolerated procedure well from CV perspective  --BP HR stable, cont Toprol and Norvasc  --resume Losartan and Chlorthalidone at D/C  --post op care per surgery  --F/U with Dr Murray as scheduled, [phone]

## 2024-04-18 NOTE — PROGRESS NOTE ADULT - SUBJECTIVE AND OBJECTIVE BOX
E Team Surgery Daily Progress Note    Subjective:   Patient seen at bedside this AM.     24h Events:   - Overnight, no acute events    Objective:  Vital Signs  T(C): 36.8 (04-18 @ 00:03), Max: 36.9 (04-17 @ 08:25)  HR: 80 (04-18 @ 00:03) (80 - 94)  BP: 137/73 (04-18 @ 00:03) (114/90 - 144/89)  RR: 18 (04-18 @ 00:03) (18 - 19)  SpO2: 98% (04-18 @ 00:03) (97% - 99%)  04-17-24 @ 07:01  -  04-18-24 @ 06:36  --------------------------------------------------------  IN:  Total IN: 0 mL    OUT:    Voided (mL): 1350 mL  Total OUT: 1350 mL    Total NET: -1350 mL          Physical Exam:  GEN: resting in bed comfortably in NAD  RESP: no increased WOB  ABD:  EXTR: warm, grossly intact  NEURO: AAOx4    Labs:                        9.6    8.38  )-----------( 309      ( 17 Apr 2024 06:16 )             30.6   04-17    138  |  100  |  24<H>  ----------------------------<  87  3.7   |  24  |  1.69<H>    Ca    9.0      17 Apr 2024 06:16  Phos  2.9     04-17  Mg     2.00     04-17      CAPILLARY BLOOD GLUCOSE          Medications:   MEDICATIONS  (STANDING):  acetaminophen     Tablet .. 975 milliGRAM(s) Oral every 6 hours  amLODIPine   Tablet 10 milliGRAM(s) Oral daily  atorvastatin 40 milliGRAM(s) Oral at bedtime  heparin   Injectable 5000 Unit(s) SubCutaneous every 8 hours  influenza   Vaccine 0.5 milliLiter(s) IntraMuscular once  metoprolol succinate ER 25 milliGRAM(s) Oral daily  nortriptyline 10 milliGRAM(s) Oral daily  pantoprazole    Tablet 40 milliGRAM(s) Oral before breakfast    MEDICATIONS  (PRN):  ondansetron Injectable 4 milliGRAM(s) IV Push every 6 hours PRN Nausea and/or Vomiting  oxyCODONE    IR 2.5 milliGRAM(s) Oral every 4 hours PRN Moderate Pain (4 - 6)  oxyCODONE    IR 5 milliGRAM(s) Oral every 4 hours PRN Severe Pain (7 - 10)      Imaging:       E Team Surgery Daily Progress Note    Subjective:   Patient seen and examined at bedside this AM. Pt states feeling better today, improvement in nausea, tolerating diet, +flatus/+BM    Objective:  Vital Signs  T(C): 36.8 (04-18 @ 00:03), Max: 36.9 (04-17 @ 08:25)  HR: 80 (04-18 @ 00:03) (80 - 94)  BP: 137/73 (04-18 @ 00:03) (114/90 - 144/89)  RR: 18 (04-18 @ 00:03) (18 - 19)  SpO2: 98% (04-18 @ 00:03) (97% - 99%)  04-17-24 @ 07:01  -  04-18-24 @ 06:36  --------------------------------------------------------  IN:  Total IN: 0 mL    OUT:    Voided (mL): 1350 mL  Total OUT: 1350 mL    Total NET: -1350 mL      Physical Exam:  GEN: resting in bed comfortably in NAD  RESP: no increased WOB  ABD:  Soft, mildly distended, appropriately tender, gauze and tape over incision c/d/i, incision healing appropriately  EXTR: grossly intact  NEURO: AAOx4      Labs:                        9.6    8.38  )-----------( 309      ( 17 Apr 2024 06:16 )             30.6   04-17    138  |  100  |  24<H>  ----------------------------<  87  3.7   |  24  |  1.69<H>    Ca    9.0      17 Apr 2024 06:16  Phos  2.9     04-17  Mg     2.00     04-17      CAPILLARY BLOOD GLUCOSE          Medications:   MEDICATIONS  (STANDING):  acetaminophen     Tablet .. 975 milliGRAM(s) Oral every 6 hours  amLODIPine   Tablet 10 milliGRAM(s) Oral daily  atorvastatin 40 milliGRAM(s) Oral at bedtime  heparin   Injectable 5000 Unit(s) SubCutaneous every 8 hours  influenza   Vaccine 0.5 milliLiter(s) IntraMuscular once  metoprolol succinate ER 25 milliGRAM(s) Oral daily  nortriptyline 10 milliGRAM(s) Oral daily  pantoprazole    Tablet 40 milliGRAM(s) Oral before breakfast    MEDICATIONS  (PRN):  ondansetron Injectable 4 milliGRAM(s) IV Push every 6 hours PRN Nausea and/or Vomiting  oxyCODONE    IR 2.5 milliGRAM(s) Oral every 4 hours PRN Moderate Pain (4 - 6)  oxyCODONE    IR 5 milliGRAM(s) Oral every 4 hours PRN Severe Pain (7 - 10)      Imaging:       E Team Surgery Daily Progress Note    Subjective:   Patient seen and examined at bedside this AM. Pt states feeling better today, improvement in nausea, tolerating diet, +flatus/+BM    Objective:  Vital Signs  T(C): 36.8 (04-18 @ 00:03), Max: 36.9 (04-17 @ 08:25)  HR: 80 (04-18 @ 00:03) (80 - 94)  BP: 137/73 (04-18 @ 00:03) (114/90 - 144/89)  RR: 18 (04-18 @ 00:03) (18 - 19)  SpO2: 98% (04-18 @ 00:03) (97% - 99%)  04-17-24 @ 07:01  -  04-18-24 @ 06:36  --------------------------------------------------------  IN:  Total IN: 0 mL    OUT:    Voided (mL): 1350 mL  Total OUT: 1350 mL    Total NET: -1350 mL      Physical Exam:  GEN: resting in bed comfortably in NAD  RESP: no increased WOB  ABD:  Soft, mildly distended, appropriately tender, incision c/d/i, incision healing appropriately  EXTR: grossly intact  NEURO: AAOx4      Labs:                        9.6    8.38  )-----------( 309      ( 17 Apr 2024 06:16 )             30.6   04-17    138  |  100  |  24<H>  ----------------------------<  87  3.7   |  24  |  1.69<H>    Ca    9.0      17 Apr 2024 06:16  Phos  2.9     04-17  Mg     2.00     04-17      CAPILLARY BLOOD GLUCOSE          Medications:   MEDICATIONS  (STANDING):  acetaminophen     Tablet .. 975 milliGRAM(s) Oral every 6 hours  amLODIPine   Tablet 10 milliGRAM(s) Oral daily  atorvastatin 40 milliGRAM(s) Oral at bedtime  heparin   Injectable 5000 Unit(s) SubCutaneous every 8 hours  influenza   Vaccine 0.5 milliLiter(s) IntraMuscular once  metoprolol succinate ER 25 milliGRAM(s) Oral daily  nortriptyline 10 milliGRAM(s) Oral daily  pantoprazole    Tablet 40 milliGRAM(s) Oral before breakfast    MEDICATIONS  (PRN):  ondansetron Injectable 4 milliGRAM(s) IV Push every 6 hours PRN Nausea and/or Vomiting  oxyCODONE    IR 2.5 milliGRAM(s) Oral every 4 hours PRN Moderate Pain (4 - 6)  oxyCODONE    IR 5 milliGRAM(s) Oral every 4 hours PRN Severe Pain (7 - 10)      Imaging:

## 2024-04-25 LAB — SURGICAL PATHOLOGY STUDY: SIGNIFICANT CHANGE UP

## 2024-05-02 ENCOUNTER — APPOINTMENT (OUTPATIENT)
Dept: SURGICAL ONCOLOGY | Facility: CLINIC | Age: 64
End: 2024-05-02
Payer: MEDICARE

## 2024-05-02 VITALS
BODY MASS INDEX: 31.82 KG/M2 | SYSTOLIC BLOOD PRESSURE: 147 MMHG | OXYGEN SATURATION: 99 % | RESPIRATION RATE: 17 BRPM | HEIGHT: 66 IN | DIASTOLIC BLOOD PRESSURE: 88 MMHG | HEART RATE: 92 BPM | WEIGHT: 198 LBS

## 2024-05-02 PROCEDURE — 99024 POSTOP FOLLOW-UP VISIT: CPT

## 2024-05-02 NOTE — HISTORY OF PRESENT ILLNESS
[de-identified] : Mr. Isaacs is a 63 y/o male who developed blood per rectum and altered bowel habits/incomplete evacuation 2 months ago. He underwent his first colonoscopy with Dr. Phuc Calle 03/28/2024 which showed a fungating/infiltrating/partially obstructing mass at the splenic flexure occupying 80% of lumen concerning for malignancy with a separate polyp just distal to mass.  A separate sigmoid colon polyp at 30 cm was removed. He denies nausea/emesis but does report intermittent left abdominal pain. He reports feeling anxious and weak. He has not had imaging or blood work for workup. He denies history of colorectal cancer in the family.  05/02/2024: Patient is s/p robotic left colectomy 04/12/2024.  He is recovering well and reports markedly improved bowel movements.  He is tolerating diet with nausea/emesis. Pathology: negative margin pT3N0 (0/18) invasive moderately differentiated adenocarcinoma.

## 2024-05-02 NOTE — ASSESSMENT
[FreeTextEntry1] : Recovering well. Resume high fiber diet. Activity as tolerated. Medical oncology referral. Follow up in 3 months.

## 2024-05-03 ENCOUNTER — NON-APPOINTMENT (OUTPATIENT)
Age: 64
End: 2024-05-03

## 2024-05-06 ENCOUNTER — OUTPATIENT (OUTPATIENT)
Dept: OUTPATIENT SERVICES | Facility: HOSPITAL | Age: 64
LOS: 1 days | Discharge: HOME | End: 2024-05-06

## 2024-05-06 DIAGNOSIS — C18.9 MALIGNANT NEOPLASM OF COLON, UNSPECIFIED: ICD-10-CM

## 2024-05-06 DIAGNOSIS — Z98.890 OTHER SPECIFIED POSTPROCEDURAL STATES: Chronic | ICD-10-CM

## 2024-05-08 ENCOUNTER — NON-APPOINTMENT (OUTPATIENT)
Age: 64
End: 2024-05-08

## 2024-05-09 ENCOUNTER — APPOINTMENT (OUTPATIENT)
Dept: HEMATOLOGY ONCOLOGY | Facility: CLINIC | Age: 64
End: 2024-05-09
Payer: MEDICARE

## 2024-05-09 VITALS
OXYGEN SATURATION: 99 % | BODY MASS INDEX: 32.69 KG/M2 | TEMPERATURE: 97.2 F | DIASTOLIC BLOOD PRESSURE: 86 MMHG | RESPIRATION RATE: 18 BRPM | HEART RATE: 100 BPM | WEIGHT: 196.21 LBS | HEIGHT: 64.96 IN | SYSTOLIC BLOOD PRESSURE: 136 MMHG

## 2024-05-09 DIAGNOSIS — F41.9 ANXIETY DISORDER, UNSPECIFIED: ICD-10-CM

## 2024-05-09 DIAGNOSIS — Z78.9 OTHER SPECIFIED HEALTH STATUS: ICD-10-CM

## 2024-05-09 DIAGNOSIS — Z86.2 PERSONAL HISTORY OF DISEASES OF THE BLOOD AND BLOOD-FORMING ORGANS AND CERTAIN DISORDERS INVOLVING THE IMMUNE MECHANISM: ICD-10-CM

## 2024-05-09 DIAGNOSIS — Z56.0 UNEMPLOYMENT, UNSPECIFIED: ICD-10-CM

## 2024-05-09 DIAGNOSIS — Z87.438 PERSONAL HISTORY OF OTHER DISEASES OF MALE GENITAL ORGANS: ICD-10-CM

## 2024-05-09 DIAGNOSIS — Z63.5 DISRUPTION OF FAMILY BY SEPARATION AND DIVORCE: ICD-10-CM

## 2024-05-09 PROCEDURE — 99205 OFFICE O/P NEW HI 60 MIN: CPT

## 2024-05-09 RX ORDER — SIMVASTATIN 20 MG/1
20 TABLET, FILM COATED ORAL
Refills: 0 | Status: ACTIVE | COMMUNITY
Start: 2024-05-09

## 2024-05-09 RX ORDER — FENOFIBRATE 48 MG/1
48 TABLET ORAL DAILY
Refills: 0 | Status: ACTIVE | COMMUNITY
Start: 2024-05-09

## 2024-05-09 RX ORDER — NORTRIPTYLINE HYDROCHLORIDE 10 MG/1
10 CAPSULE ORAL
Refills: 0 | Status: ACTIVE | COMMUNITY
Start: 2024-05-09

## 2024-05-09 RX ORDER — LOSARTAN POTASSIUM 25 MG/1
25 TABLET, FILM COATED ORAL DAILY
Refills: 0 | Status: ACTIVE | COMMUNITY
Start: 2024-05-09

## 2024-05-09 RX ORDER — AMLODIPINE BESYLATE 10 MG/1
10 TABLET ORAL DAILY
Refills: 0 | Status: ACTIVE | COMMUNITY
Start: 2024-05-09

## 2024-05-09 RX ORDER — METOPROLOL TARTRATE 25 MG/1
25 TABLET, FILM COATED ORAL DAILY
Refills: 0 | Status: ACTIVE | COMMUNITY
Start: 2024-05-09

## 2024-05-09 RX ORDER — CHLORTHALIDONE 25 MG/1
25 TABLET ORAL DAILY
Refills: 0 | Status: ACTIVE | COMMUNITY
Start: 2024-05-09

## 2024-05-09 SDOH — ECONOMIC STABILITY - INCOME SECURITY: UNEMPLOYMENT, UNSPECIFIED: Z56.0

## 2024-05-09 SDOH — SOCIAL STABILITY - SOCIAL INSECURITY: DISRUPTION OF FAMILY BY SEPARATION AND DIVORCE: Z63.5

## 2024-05-09 NOTE — CONSULT LETTER
[Dear  ___] : Dear  [unfilled], [Consult Letter:] : I had the pleasure of evaluating your patient, [unfilled]. [Please see my note below.] : Please see my note below. [Consult Closing:] : Thank you very much for allowing me to participate in the care of this patient.  If you have any questions, please do not hesitate to contact me. [Sincerely,] : Sincerely, [FreeTextEntry2] : Dr. Mervin Guillen [FreeTextEntry3] : [facility] will be [DrBrayden  ___] : Dr. ARIAS [DrBrayden ___] : Dr. ARIAS

## 2024-05-09 NOTE — REASON FOR VISIT
[Initial Consultation] : an initial consultation [FreeTextEntry2] : left sided, colon ca splenic flexure

## 2024-05-09 NOTE — PHYSICAL EXAM
[Restricted in physically strenuous activity but ambulatory and able to carry out work of a light or sedentary nature] : Status 1- Restricted in physically strenuous activity but ambulatory and able to carry out work of a light or sedentary nature, e.g., light house work, office work [Thin] : thin [Normal] : bilateral breasts without nipple retraction, skin dimpling or palpable masses; the bilateral axillae are without adenopathy [de-identified] : + healing incision scar to lower abdomen, nondistended, soft, non-tender.  [de-identified] : h/o mental illness

## 2024-05-09 NOTE — REVIEW OF SYSTEMS
[Palpitations] : palpitations [Anxiety] : anxiety [Negative] : Allergic/Immunologic [Chest Pain] : no chest pain [Leg Claudication] : no intermittent leg claudication [Lower Ext Edema] : no lower extremity edema [Suicidal] : not suicidal [Insomnia] : no insomnia [FreeTextEntry5] : h/o Palpitations with anxiety [de-identified] : Mental illness

## 2024-05-09 NOTE — ASSESSMENT
[Curative] : Goals of care discussed with patient: Curative [FreeTextEntry1] : A discussion took place with the patient who came alone regarding further management.  The patient has undergone evaluation for his recent history of the development of blood per rectum and change in bowel habits over the past 2 months beginning January 2024.  He subsequently underwent colonoscopy on March 28, 2024 which revealed a splenic flexure mass in the left colon which on biopsy was invasive moderately differentiated adenocarcinoma.  The tumor was described as fungating infiltrating and partially obstructing occupying 80% of the lumen.  A separate polyp distal to the mass was removed which was a tubular adenoma.  The patient underwent CAT scans of the chest abdomen pelvis which revealed circumferential wall thickening of about 4 cm in the length of the splenic flexure consistent with neoplasm.  There is no obvious obstruction there was small nonenlarged mesocolic lymph nodes neck to the mass.  There was no evidence of metastatic disease or distant lymphadenopathy.  On April 12, 2024 the patient underwent left colectomy which revealed invasive moderately differentiated adenocarcinoma measuring 5 cm invading through the muscularis propria into the pericolonic tissue.  The margins were negative and 17 lymph nodes were negative for metastases.  The stage was T3 N0.  There is no evidence of perforation, lymphatic or vascular invasion or perineural invasion.  The tumor budding score was low.  There were no tumor deposits.  The patient is staged as low risk stage II tumor.  His postoperative blood testing revealed mild anemia with hemoglobin 10.5 hematocrit 34.  The creatinine was 1.78.  The patient has done well postoperatively.  It was explained that since the patient has clinical and pathologic findings of low risk stage II colon carcinoma this would indicate the risk for recurrence with the 15 to 20%.  Most likely he will continue to do well and even administering chemotherapy in the adjuvant setting would not significantly improve his survival and may be associated with side effects and toxicity.  As a result we will be recommending surveillance for this patient and in addition we will also offer circulating tumor DNA testing as a way to monitor for recurrence of his disease.  This would be drawn every 3 to 4 months for the first 2 years to ensure that there is no residual evidence of disease or lessen the chance for recurrent disease.  We also spoke about lifestyle ways to improve prevention of recurrent disease including low-fat diet along with physical activity and exercise.  We will offer the patient consultation with nutrition is/dietitians in order to instruct him on a low-fat diet in an attempt to lessen his a exposure to carcinogens.  Several studies suggest that the dietary factors and physical activity are ways that may decrease recurrence of disease in patients with colorectal tumors.  The patient seems to be motivated to improve his lifestyle ways and this will be monitored.  We recommend that he return every 3 months for the first 2 years and undergo CAT scans every 6 months and then for the next 3 years medical visits every 6 months and CAT scans once a year to complete a 5-year surveillance program.  The patient states he has an underlying anxiety condition as a result we spent time with him and going slowly over information so as not to overwhelm him with medical recommendations.  The patient will return in 3 months or sooner as needed and anticipate he will do well.

## 2024-05-09 NOTE — HISTORY OF PRESENT ILLNESS
[Disease: _____________________] : Disease: [unfilled] [T: ___] : T[unfilled] [N: ___] : N[unfilled] [M: ___] : M[unfilled] [AJCC Stage: ____] : AJCC Stage: [unfilled] [de-identified] : 64 years old male with PMHX Hypertension, Hyperlipidemia, Anxiety disorder, BPH, Mental illness comes evaluation of newly diagnosed Colon Cancer. As per patient, his symptoms began in January 2024 with increased constipation which led to straining and BRBPR. He was evaluated by his PCP who referred him to GI MD. He was scheduled for a Colonoscopy that was performed on March 28, 2024, which discovered partially obstructing mass in the splenic flexure. Patient was scheduled for surgical resection on 04/12.2024.   03/28/2024, Colonoscopy: The procedure was aborted due to partially obstructing mass. One 10 mm polyp in the sigmoid colon, removed with a hot snare. Resected and retrieved. Likely malignant partially obstructing tumor at the splenic flexure. Biopsied. Tattooed. One 12 mm polyp in the proximal descending colon. Resection not attempted. Non-bleeding internal hemorrhoids. Surgical pathology from that day was as follows: 1. Colon, splenic flexure mass, biopsy- Invasive moderately differentiated adenocarcinoma. 2. Colon, sigmoid, polyp, polypectomy-Tubular adenoma.   04/09/2024, CT-Scan of Abdomen/Pelvis/Chest: Circumferential colonic mass in the splenic flexure correlating with the finding reported on colonoscopy is highly concerning for colonic neoplasm. Small, nonenlarged mesocolic lymph nodes next to the mass are indeterminate. No evidence of distant metastatic disease or distant lymphadenopathy  04/12/2024, Surgical Resection report: 1. Lymph node, mesocolic, excision- Benign fibroadipose tissue 2. Lymph node, additional mesocolic, excision- One lymph node, negative for carcinoma (0/1) 3. Colon, left, colectomy- Invasive moderately differentiated adenocarcinoma (5.0 cm). Tumor invading through muscularis propria into pericolonic tissue. Resection margins negative for carcinoma.  Seventeen lymph nodes negative for carcinoma (0/17)  pTNM Stage (AJCC 8th ed.): pT3N0.  See synoptic summary.  4. Omentum, resection- Benign adipose tissue Synoptic Summary:  Colon and Rectum - Resection. Specimen. Procedure: left colectomy. Tumor, Tumor Site: Splenic flexure. Histologic Type: Adenocarcinoma. Histologic Grade:   G2, moderately differentiated. Tumor Size: 5.0 x 3.6 x 0.8 Centimeters (cm). Tumor Extent:  Invades through muscularis propria into the pericolonic or perirectal tissue. Macroscopic Tumor Perforation: Not identified. Lymphatic and / or Vascular Invasion: Not identified. Perineural Invasion: Not identified. Tumor Budding Score: Low (0-4). Number of Tumor Buds: 3 per 'hotspot' field. Type of Polyp in which Invasive Carcinoma Arose: None identified. Treatment Effect: No known presurgical therapy. Margins, Margin Status for Invasive Carcinoma:  All margins negative for invasive carcinoma. Closest Margin(s) to Invasive Carcinoma: Proximal. Distance from Invasive Carcinoma to Closest Margin:    3.7 cm. Margin Status for Non-Invasive Tumor: All margins negative for high-grade dysplasia / intramucosal carcinoma and low-grade dysplasia. Regional Lymph Nodes,Regional Lymph Node Status: All regional lymph nodes negative for tumor. Number of Lymph Nodes Examined: 17. Tumor Deposits: Not identified.  pTNM Classification (AJCC 8th Edition) pT Category: pT3, pN Category: pN0 Immunohistochemistry Testing (IHC) for Mismatch Repair Proteins performed with following results: MLH1: Intact nuclear expression. PMS2: Intact nuclear expression.  MSH2: Intact nuclear expression.  MSH6: Intact nuclear expression. Interpretation:  No loss of nuclear expression of MMR proteins (MLH1, MSH2, MSH6, and PMS2).  These results show low probability of microsatellite instability-high (MSI-H). There is no immunohistochemical evidence of DNA mismatch repair deficiency in the analyzed tissue, indicating there is a low probability for Snow syndrome.   Patient is divorce. He lives with his brother. He has 1 well adult child. He does not smoke, and he does not drink alcohol. He receives Disability 2nd Mental Illness. He has a positive family history of cancer. His father had Leukemia.  [de-identified] : Invasive moderately differentiated adenocarcinoma [de-identified] : 4/12/24-left colectomy low risk  stage II colon ca

## 2024-05-21 ENCOUNTER — NON-APPOINTMENT (OUTPATIENT)
Age: 64
End: 2024-05-21

## 2024-05-30 ENCOUNTER — APPOINTMENT (OUTPATIENT)
Dept: SURGICAL ONCOLOGY | Facility: CLINIC | Age: 64
End: 2024-05-30
Payer: MEDICARE

## 2024-05-30 VITALS
HEIGHT: 64 IN | SYSTOLIC BLOOD PRESSURE: 120 MMHG | BODY MASS INDEX: 33.97 KG/M2 | RESPIRATION RATE: 17 BRPM | OXYGEN SATURATION: 99 % | DIASTOLIC BLOOD PRESSURE: 82 MMHG | WEIGHT: 199 LBS | HEART RATE: 87 BPM

## 2024-05-30 DIAGNOSIS — C18.5 MALIGNANT NEOPLASM OF SPLENIC FLEXURE: ICD-10-CM

## 2024-05-30 PROCEDURE — 99024 POSTOP FOLLOW-UP VISIT: CPT

## 2024-05-30 NOTE — ASSESSMENT
[FreeTextEntry1] : Good recovery from partial colectomy. No contraindication to proceed with greenlight laser treatment at this time.

## 2024-05-30 NOTE — HISTORY OF PRESENT ILLNESS
[de-identified] : Mr. Isaacs is a 63 y/o male who developed blood per rectum and altered bowel habits/incomplete evacuation 2 months ago. He underwent his first colonoscopy with Dr. Phuc Calle 03/28/2024 which showed a fungating/infiltrating/partially obstructing mass at the splenic flexure occupying 80% of lumen concerning for malignancy with a separate polyp just distal to mass.  A separate sigmoid colon polyp at 30 cm was removed. He denies nausea/emesis but does report intermittent left abdominal pain. He reports feeling anxious and weak. He has not had imaging or blood work for workup. He denies history of colorectal cancer in the family.  05/02/2024: Patient is s/p robotic left colectomy 04/12/2024.  He is recovering well and reports markedly improved bowel movements.  He is tolerating diet with nausea/emesis. Pathology: negative margin pT3N0 (0/18) invasive moderately differentiated adenocarcinoma.  05/30/2024: Patient feels well and has recovered well from robotic left colectomy.  He is scheduled for greenlight laser treatment 06/12/2024 for BPH.

## 2024-08-04 ENCOUNTER — OUTPATIENT (OUTPATIENT)
Dept: OUTPATIENT SERVICES | Facility: HOSPITAL | Age: 64
LOS: 1 days | Discharge: HOME | End: 2024-08-04

## 2024-08-04 DIAGNOSIS — Z98.890 OTHER SPECIFIED POSTPROCEDURAL STATES: Chronic | ICD-10-CM

## 2024-08-04 DIAGNOSIS — C18.9 MALIGNANT NEOPLASM OF COLON, UNSPECIFIED: ICD-10-CM

## 2024-08-06 ENCOUNTER — APPOINTMENT (OUTPATIENT)
Dept: SURGICAL ONCOLOGY | Facility: CLINIC | Age: 64
End: 2024-08-06

## 2024-08-06 PROCEDURE — 99213 OFFICE O/P EST LOW 20 MIN: CPT

## 2024-08-06 NOTE — ASSESSMENT
[FreeTextEntry1] : Recovered well after robotic left hemicolectomy. To continue surveillance imaging with medical oncology to evaluate for recurrent/metastatic disease. Follow up with GI for routine colonoscopy. Return to office in 6 months. All questions answered.

## 2024-08-06 NOTE — HISTORY OF PRESENT ILLNESS
[de-identified] : Mr. Isaacs is a 65 y/o male who developed blood per rectum and altered bowel habits/incomplete evacuation 2 months ago. He underwent his first colonoscopy with Dr. Phuc Calle 03/28/2024 which showed a fungating/infiltrating/partially obstructing mass at the splenic flexure occupying 80% of lumen concerning for malignancy with a separate polyp just distal to mass.  A separate sigmoid colon polyp at 30 cm was removed. He denies nausea/emesis but does report intermittent left abdominal pain. He reports feeling anxious and weak. He has not had imaging or blood work for workup. He denies history of colorectal cancer in the family.  05/02/2024: Patient is s/p robotic left colectomy 04/12/2024.  He is recovering well and reports markedly improved bowel movements.  He is tolerating diet with nausea/emesis. Pathology: negative margin pT3N0 (0/18) invasive moderately differentiated adenocarcinoma.  05/30/2024: Patient feels well and has recovered well from robotic left colectomy.  He is scheduled for greenlight laser treatment 06/12/2024 for BPH.  08/06/2024: Patient is on surveillance for stage 2 left colon cancer.  He reports feeling well and is tolerating diet without nausea/emesis, abdominal pain or weight loss.  He reports regular bowel movements.  he has appointment next week to see medical oncology to schedule surveillance CT scan.  He denies other issues.

## 2024-08-06 NOTE — PHYSICAL EXAM
[FreeTextEntry1] : Abdomen: soft, nontender/nondistended.  Incisions healed well without incisional hernia. [Normal] : supple, no neck mass and thyroid not enlarged [Normal Neck Lymph Nodes] : normal neck lymph nodes  [Normal Supraclavicular Lymph Nodes] : normal supraclavicular lymph nodes [Normal Groin Lymph Nodes] : normal groin lymph nodes [Normal Axillary Lymph Nodes] : normal axillary lymph nodes [Normal] : oriented to person, place and time, with appropriate affect [de-identified] : obese

## 2024-08-08 ENCOUNTER — RESULT REVIEW (OUTPATIENT)
Age: 64
End: 2024-08-08

## 2024-08-08 ENCOUNTER — APPOINTMENT (OUTPATIENT)
Dept: HEMATOLOGY ONCOLOGY | Facility: CLINIC | Age: 64
End: 2024-08-08

## 2024-08-08 PROBLEM — C18.9 CARCINOMA OF COLON: Status: ACTIVE | Noted: 2024-08-08

## 2024-08-08 LAB
BASOPHILS # BLD AUTO: 0.05 K/UL — SIGNIFICANT CHANGE UP (ref 0–0.2)
BASOPHILS NFR BLD AUTO: 0.7 % — SIGNIFICANT CHANGE UP (ref 0–2)
EOSINOPHIL # BLD AUTO: 0.16 K/UL — SIGNIFICANT CHANGE UP (ref 0–0.5)
EOSINOPHIL NFR BLD AUTO: 2.3 % — SIGNIFICANT CHANGE UP (ref 0–6)
HCT VFR BLD CALC: 37.1 % — LOW (ref 39–50)
HGB BLD-MCNC: 11.9 G/DL — LOW (ref 13–17)
IMM GRANULOCYTES NFR BLD AUTO: 0.3 % — SIGNIFICANT CHANGE UP (ref 0–0.9)
LYMPHOCYTES # BLD AUTO: 1.64 K/UL — SIGNIFICANT CHANGE UP (ref 1–3.3)
LYMPHOCYTES # BLD AUTO: 23.8 % — SIGNIFICANT CHANGE UP (ref 13–44)
MCHC RBC-ENTMCNC: 25.2 PG — LOW (ref 27–34)
MCHC RBC-ENTMCNC: 32.1 G/DL — SIGNIFICANT CHANGE UP (ref 32–36)
MCV RBC AUTO: 78.4 FL — LOW (ref 80–100)
MONOCYTES # BLD AUTO: 0.59 K/UL — SIGNIFICANT CHANGE UP (ref 0–0.9)
MONOCYTES NFR BLD AUTO: 8.6 % — SIGNIFICANT CHANGE UP (ref 2–14)
NEUTROPHILS # BLD AUTO: 4.42 K/UL — SIGNIFICANT CHANGE UP (ref 1.8–7.4)
NEUTROPHILS NFR BLD AUTO: 64.3 % — SIGNIFICANT CHANGE UP (ref 43–77)
NRBC # BLD: 0 /100 WBCS — SIGNIFICANT CHANGE UP (ref 0–0)
PLATELET # BLD AUTO: 261 K/UL — SIGNIFICANT CHANGE UP (ref 150–400)
RBC # BLD: 4.73 M/UL — SIGNIFICANT CHANGE UP (ref 4.2–5.8)
RBC # FLD: 16.4 % — HIGH (ref 10.3–14.5)
WBC # BLD: 6.88 K/UL — SIGNIFICANT CHANGE UP (ref 3.8–10.5)
WBC # FLD AUTO: 6.88 K/UL — SIGNIFICANT CHANGE UP (ref 3.8–10.5)

## 2024-08-08 PROCEDURE — 99214 OFFICE O/P EST MOD 30 MIN: CPT

## 2024-08-08 NOTE — ASSESSMENT
[Curative] : Goals of care discussed with patient: Curative [FreeTextEntry1] : The patient is under surveillance for recurrent cancer. Presently, he is doing well without any symptoms of recurrent disease. He will do blood test with tumor markers, f/u. I have ordered a Ct-Scan of Abdomen/Pelvis/ Chest which is due in 10/09/2024. Also, The Ct-DNA test will be done to check for any residual disease today, and then every 3 months for 2 years. We recommend that he returns for medical evaluation every 3 months for the first 2 years, and also, he will get repeat CAT scans every 6 month for 2 years.  After 2 years, the CAT scans will be done once a year for 3 years with medical evaluation every 6 months to complete a 5-year surveillance program. He should continue to follow up with GI, Medical and Surgeon as directed. RTO in 3 months.

## 2024-08-08 NOTE — REVIEW OF SYSTEMS
[Palpitations] : palpitations [Anxiety] : anxiety [Negative] : Allergic/Immunologic [Chest Pain] : no chest pain [Leg Claudication] : no intermittent leg claudication [Lower Ext Edema] : no lower extremity edema [Suicidal] : not suicidal [Insomnia] : no insomnia [FreeTextEntry5] : h/o Palpitations with anxiety [de-identified] : Mental illness

## 2024-08-08 NOTE — PHYSICAL EXAM
[Restricted in physically strenuous activity but ambulatory and able to carry out work of a light or sedentary nature] : Status 1- Restricted in physically strenuous activity but ambulatory and able to carry out work of a light or sedentary nature, e.g., light house work, office work [Thin] : thin [Normal] : grossly intact [de-identified] : + healing incision scar to lower abdomen, nondistended, soft, non-tender.  [de-identified] : h/o mental illness

## 2024-08-08 NOTE — HISTORY OF PRESENT ILLNESS
[Disease: _____________________] : Disease: [unfilled] [T: ___] : T[unfilled] [N: ___] : N[unfilled] [M: ___] : M[unfilled] [AJCC Stage: ____] : AJCC Stage: [unfilled] [de-identified] : 64 years old male with PMHX Hypertension, Hyperlipidemia, Anxiety disorder, BPH, Mental illness comes evaluation of newly diagnosed Colon Cancer. As per patient, his symptoms began in January 2024 with increased constipation which led to straining and BRBPR. He was evaluated by his PCP who referred him to GI MD. He was scheduled for a Colonoscopy that was performed on March 28, 2024, which discovered partially obstructing mass in the splenic flexure. Patient was scheduled for surgical resection on 04/12.2024.   03/28/2024, Colonoscopy: The procedure was aborted due to partially obstructing mass. One 10 mm polyp in the sigmoid colon, removed with a hot snare. Resected and retrieved. Likely malignant partially obstructing tumor at the splenic flexure. Biopsied. Tattooed. One 12 mm polyp in the proximal descending colon. Resection not attempted. Non-bleeding internal hemorrhoids. Surgical pathology from that day was as follows: 1. Colon, splenic flexure mass, biopsy- Invasive moderately differentiated adenocarcinoma. 2. Colon, sigmoid, polyp, polypectomy-Tubular adenoma.   04/09/2024, CT-Scan of Abdomen/Pelvis/Chest: Circumferential colonic mass in the splenic flexure correlating with the finding reported on colonoscopy is highly concerning for colonic neoplasm. Small, nonenlarged mesocolic lymph nodes next to the mass are indeterminate. No evidence of distant metastatic disease or distant lymphadenopathy  04/12/2024, Surgical Resection report: 1. Lymph node, mesocolic, excision- Benign fibroadipose tissue 2. Lymph node, additional mesocolic, excision- One lymph node, negative for carcinoma (0/1) 3. Colon, left, colectomy- Invasive moderately differentiated adenocarcinoma (5.0 cm). Tumor invading through muscularis propria into pericolonic tissue. Resection margins negative for carcinoma.  Seventeen lymph nodes negative for carcinoma (0/17)  pTNM Stage (AJCC 8th ed.): pT3N0.  See synoptic summary.  4. Omentum, resection- Benign adipose tissue Synoptic Summary:  Colon and Rectum - Resection. Specimen. Procedure: left colectomy. Tumor, Tumor Site: Splenic flexure. Histologic Type: Adenocarcinoma. Histologic Grade:   G2, moderately differentiated. Tumor Size: 5.0 x 3.6 x 0.8 Centimeters (cm). Tumor Extent:  Invades through muscularis propria into the pericolonic or perirectal tissue. Macroscopic Tumor Perforation: Not identified. Lymphatic and / or Vascular Invasion: Not identified. Perineural Invasion: Not identified. Tumor Budding Score: Low (0-4). Number of Tumor Buds: 3 per 'hotspot' field. Type of Polyp in which Invasive Carcinoma Arose: None identified. Treatment Effect: No known presurgical therapy. Margins, Margin Status for Invasive Carcinoma:  All margins negative for invasive carcinoma. Closest Margin(s) to Invasive Carcinoma: Proximal. Distance from Invasive Carcinoma to Closest Margin:    3.7 cm. Margin Status for Non-Invasive Tumor: All margins negative for high-grade dysplasia / intramucosal carcinoma and low-grade dysplasia. Regional Lymph Nodes,Regional Lymph Node Status: All regional lymph nodes negative for tumor. Number of Lymph Nodes Examined: 17. Tumor Deposits: Not identified.  pTNM Classification (AJCC 8th Edition) pT Category: pT3, pN Category: pN0 Immunohistochemistry Testing (IHC) for Mismatch Repair Proteins performed with following results: MLH1: Intact nuclear expression. PMS2: Intact nuclear expression.  MSH2: Intact nuclear expression.  MSH6: Intact nuclear expression. Interpretation:  No loss of nuclear expression of MMR proteins (MLH1, MSH2, MSH6, and PMS2).  These results show low probability of microsatellite instability-high (MSI-H). There is no immunohistochemical evidence of DNA mismatch repair deficiency in the analyzed tissue, indicating there is a low probability for Snow syndrome.   Patient is divorce. He lives with his brother. He has 1 well adult child. He does not smoke, and he does not drink alcohol. He receives Disability 2nd Mental Illness. He has a positive family history of cancer. His father had Leukemia.  [de-identified] : Invasive moderately differentiated adenocarcinoma [de-identified] : 4/12/24-left colectomy low risk  stage II colon ca [de-identified] : Patient is here for f/u. He feels well. He denies any weakness, fatigue, or abdominal pain. He has no diarrhea, constipation, bloody or melena stools.

## 2024-08-08 NOTE — PHYSICAL EXAM
[Restricted in physically strenuous activity but ambulatory and able to carry out work of a light or sedentary nature] : Status 1- Restricted in physically strenuous activity but ambulatory and able to carry out work of a light or sedentary nature, e.g., light house work, office work [Thin] : thin [Normal] : grossly intact [de-identified] : + healing incision scar to lower abdomen, nondistended, soft, non-tender.  [de-identified] : h/o mental illness

## 2024-08-08 NOTE — REVIEW OF SYSTEMS
[Palpitations] : palpitations [Anxiety] : anxiety [Negative] : Allergic/Immunologic [Chest Pain] : no chest pain [Leg Claudication] : no intermittent leg claudication [Lower Ext Edema] : no lower extremity edema [Suicidal] : not suicidal [Insomnia] : no insomnia [FreeTextEntry5] : h/o Palpitations with anxiety [de-identified] : Mental illness

## 2024-08-08 NOTE — HISTORY OF PRESENT ILLNESS
[Disease: _____________________] : Disease: [unfilled] [T: ___] : T[unfilled] [N: ___] : N[unfilled] [M: ___] : M[unfilled] [AJCC Stage: ____] : AJCC Stage: [unfilled] [de-identified] : 64 years old male with PMHX Hypertension, Hyperlipidemia, Anxiety disorder, BPH, Mental illness comes evaluation of newly diagnosed Colon Cancer. As per patient, his symptoms began in January 2024 with increased constipation which led to straining and BRBPR. He was evaluated by his PCP who referred him to GI MD. He was scheduled for a Colonoscopy that was performed on March 28, 2024, which discovered partially obstructing mass in the splenic flexure. Patient was scheduled for surgical resection on 04/12.2024.   03/28/2024, Colonoscopy: The procedure was aborted due to partially obstructing mass. One 10 mm polyp in the sigmoid colon, removed with a hot snare. Resected and retrieved. Likely malignant partially obstructing tumor at the splenic flexure. Biopsied. Tattooed. One 12 mm polyp in the proximal descending colon. Resection not attempted. Non-bleeding internal hemorrhoids. Surgical pathology from that day was as follows: 1. Colon, splenic flexure mass, biopsy- Invasive moderately differentiated adenocarcinoma. 2. Colon, sigmoid, polyp, polypectomy-Tubular adenoma.   04/09/2024, CT-Scan of Abdomen/Pelvis/Chest: Circumferential colonic mass in the splenic flexure correlating with the finding reported on colonoscopy is highly concerning for colonic neoplasm. Small, nonenlarged mesocolic lymph nodes next to the mass are indeterminate. No evidence of distant metastatic disease or distant lymphadenopathy  04/12/2024, Surgical Resection report: 1. Lymph node, mesocolic, excision- Benign fibroadipose tissue 2. Lymph node, additional mesocolic, excision- One lymph node, negative for carcinoma (0/1) 3. Colon, left, colectomy- Invasive moderately differentiated adenocarcinoma (5.0 cm). Tumor invading through muscularis propria into pericolonic tissue. Resection margins negative for carcinoma.  Seventeen lymph nodes negative for carcinoma (0/17)  pTNM Stage (AJCC 8th ed.): pT3N0.  See synoptic summary.  4. Omentum, resection- Benign adipose tissue Synoptic Summary:  Colon and Rectum - Resection. Specimen. Procedure: left colectomy. Tumor, Tumor Site: Splenic flexure. Histologic Type: Adenocarcinoma. Histologic Grade:   G2, moderately differentiated. Tumor Size: 5.0 x 3.6 x 0.8 Centimeters (cm). Tumor Extent:  Invades through muscularis propria into the pericolonic or perirectal tissue. Macroscopic Tumor Perforation: Not identified. Lymphatic and / or Vascular Invasion: Not identified. Perineural Invasion: Not identified. Tumor Budding Score: Low (0-4). Number of Tumor Buds: 3 per 'hotspot' field. Type of Polyp in which Invasive Carcinoma Arose: None identified. Treatment Effect: No known presurgical therapy. Margins, Margin Status for Invasive Carcinoma:  All margins negative for invasive carcinoma. Closest Margin(s) to Invasive Carcinoma: Proximal. Distance from Invasive Carcinoma to Closest Margin:    3.7 cm. Margin Status for Non-Invasive Tumor: All margins negative for high-grade dysplasia / intramucosal carcinoma and low-grade dysplasia. Regional Lymph Nodes,Regional Lymph Node Status: All regional lymph nodes negative for tumor. Number of Lymph Nodes Examined: 17. Tumor Deposits: Not identified.  pTNM Classification (AJCC 8th Edition) pT Category: pT3, pN Category: pN0 Immunohistochemistry Testing (IHC) for Mismatch Repair Proteins performed with following results: MLH1: Intact nuclear expression. PMS2: Intact nuclear expression.  MSH2: Intact nuclear expression.  MSH6: Intact nuclear expression. Interpretation:  No loss of nuclear expression of MMR proteins (MLH1, MSH2, MSH6, and PMS2).  These results show low probability of microsatellite instability-high (MSI-H). There is no immunohistochemical evidence of DNA mismatch repair deficiency in the analyzed tissue, indicating there is a low probability for Snow syndrome.   Patient is divorce. He lives with his brother. He has 1 well adult child. He does not smoke, and he does not drink alcohol. He receives Disability 2nd Mental Illness. He has a positive family history of cancer. His father had Leukemia.  [de-identified] : Invasive moderately differentiated adenocarcinoma [de-identified] : 4/12/24-left colectomy low risk  stage II colon ca [de-identified] : Patient is here for f/u. He feels well. He denies any weakness, fatigue, or abdominal pain. He has no diarrhea, constipation, bloody or melena stools.

## 2024-10-01 NOTE — PATIENT PROFILE ADULT - TOBACCO USE
Pt had to wait overnight for [facility] paper work. Now completed. Pt DC'd this morning. Pt had to wait overnight for [facility] paper work. Now completed. Pt DC'd this morning.  Prednisone taper sent to [facility] yesterday. Other details per DC note Never smoker

## 2024-11-04 ENCOUNTER — OUTPATIENT (OUTPATIENT)
Dept: OUTPATIENT SERVICES | Facility: HOSPITAL | Age: 64
LOS: 1 days | Discharge: HOME | End: 2024-11-04

## 2024-11-04 DIAGNOSIS — C18.9 MALIGNANT NEOPLASM OF COLON, UNSPECIFIED: ICD-10-CM

## 2024-11-04 DIAGNOSIS — Z98.890 OTHER SPECIFIED POSTPROCEDURAL STATES: Chronic | ICD-10-CM

## 2024-11-07 ENCOUNTER — RESULT REVIEW (OUTPATIENT)
Age: 64
End: 2024-11-07

## 2024-11-07 ENCOUNTER — APPOINTMENT (OUTPATIENT)
Dept: HEMATOLOGY ONCOLOGY | Facility: CLINIC | Age: 64
End: 2024-11-07
Payer: MEDICARE

## 2024-11-07 VITALS
RESPIRATION RATE: 18 BRPM | BODY MASS INDEX: 36.05 KG/M2 | TEMPERATURE: 97.5 F | WEIGHT: 210 LBS | HEART RATE: 89 BPM | DIASTOLIC BLOOD PRESSURE: 97 MMHG | SYSTOLIC BLOOD PRESSURE: 147 MMHG | OXYGEN SATURATION: 96 %

## 2024-11-07 DIAGNOSIS — C18.9 MALIGNANT NEOPLASM OF COLON, UNSPECIFIED: ICD-10-CM

## 2024-11-07 LAB
BASOPHILS # BLD AUTO: 0.04 K/UL — SIGNIFICANT CHANGE UP (ref 0–0.2)
BASOPHILS NFR BLD AUTO: 0.6 % — SIGNIFICANT CHANGE UP (ref 0–2)
EOSINOPHIL # BLD AUTO: 0.15 K/UL — SIGNIFICANT CHANGE UP (ref 0–0.5)
EOSINOPHIL NFR BLD AUTO: 2.2 % — SIGNIFICANT CHANGE UP (ref 0–6)
HCT VFR BLD CALC: 38.5 % — LOW (ref 39–50)
HGB BLD-MCNC: 12.5 G/DL — LOW (ref 13–17)
IMM GRANULOCYTES NFR BLD AUTO: 0.4 % — SIGNIFICANT CHANGE UP (ref 0–0.9)
LYMPHOCYTES # BLD AUTO: 1.65 K/UL — SIGNIFICANT CHANGE UP (ref 1–3.3)
LYMPHOCYTES # BLD AUTO: 23.9 % — SIGNIFICANT CHANGE UP (ref 13–44)
MCHC RBC-ENTMCNC: 26.7 PG — LOW (ref 27–34)
MCHC RBC-ENTMCNC: 32.5 G/DL — SIGNIFICANT CHANGE UP (ref 32–36)
MCV RBC AUTO: 82.1 FL — SIGNIFICANT CHANGE UP (ref 80–100)
MONOCYTES # BLD AUTO: 0.49 K/UL — SIGNIFICANT CHANGE UP (ref 0–0.9)
MONOCYTES NFR BLD AUTO: 7.1 % — SIGNIFICANT CHANGE UP (ref 2–14)
NEUTROPHILS # BLD AUTO: 4.54 K/UL — SIGNIFICANT CHANGE UP (ref 1.8–7.4)
NEUTROPHILS NFR BLD AUTO: 65.8 % — SIGNIFICANT CHANGE UP (ref 43–77)
NRBC # BLD: 0 /100 WBCS — SIGNIFICANT CHANGE UP (ref 0–0)
NRBC BLD-RTO: 0 /100 WBCS — SIGNIFICANT CHANGE UP (ref 0–0)
PLATELET # BLD AUTO: 226 K/UL — SIGNIFICANT CHANGE UP (ref 150–400)
RBC # BLD: 4.69 M/UL — SIGNIFICANT CHANGE UP (ref 4.2–5.8)
RBC # FLD: 14.6 % — HIGH (ref 10.3–14.5)
WBC # BLD: 6.9 K/UL — SIGNIFICANT CHANGE UP (ref 3.8–10.5)
WBC # FLD AUTO: 6.9 K/UL — SIGNIFICANT CHANGE UP (ref 3.8–10.5)

## 2024-11-07 PROCEDURE — 99213 OFFICE O/P EST LOW 20 MIN: CPT

## 2024-11-08 LAB
ALBUMIN SERPL ELPH-MCNC: 4.1 G/DL
ALP BLD-CCNC: 90 U/L
ALT SERPL-CCNC: 21 U/L
ANION GAP SERPL CALC-SCNC: 13 MMOL/L
AST SERPL-CCNC: 22 U/L
BILIRUB SERPL-MCNC: 0.4 MG/DL
BUN SERPL-MCNC: 19 MG/DL
CALCIUM SERPL-MCNC: 9.5 MG/DL
CEA SERPL-MCNC: 1.2 NG/ML
CHLORIDE SERPL-SCNC: 106 MMOL/L
CO2 SERPL-SCNC: 24 MMOL/L
CREAT SERPL-MCNC: 1.54 MG/DL
EGFR: 51 ML/MIN/1.73M2
GLUCOSE SERPL-MCNC: 87 MG/DL
POTASSIUM SERPL-SCNC: 4.5 MMOL/L
PROT SERPL-MCNC: 6.6 G/DL
SODIUM SERPL-SCNC: 143 MMOL/L

## 2024-11-19 ENCOUNTER — APPOINTMENT (OUTPATIENT)
Dept: SURGICAL ONCOLOGY | Facility: CLINIC | Age: 64
End: 2024-11-19
Payer: MEDICARE

## 2024-11-19 VITALS
SYSTOLIC BLOOD PRESSURE: 138 MMHG | OXYGEN SATURATION: 99 % | HEIGHT: 64 IN | HEART RATE: 96 BPM | BODY MASS INDEX: 35.85 KG/M2 | DIASTOLIC BLOOD PRESSURE: 90 MMHG | WEIGHT: 210 LBS

## 2024-11-19 DIAGNOSIS — C18.5 MALIGNANT NEOPLASM OF SPLENIC FLEXURE: ICD-10-CM

## 2024-11-19 PROCEDURE — 99213 OFFICE O/P EST LOW 20 MIN: CPT

## 2024-11-25 ENCOUNTER — RESULT REVIEW (OUTPATIENT)
Age: 64
End: 2024-11-25

## 2024-11-29 ENCOUNTER — OUTPATIENT (OUTPATIENT)
Dept: OUTPATIENT SERVICES | Facility: HOSPITAL | Age: 64
LOS: 1 days | End: 2024-11-29
Payer: MEDICARE

## 2024-11-29 ENCOUNTER — APPOINTMENT (OUTPATIENT)
Dept: CT IMAGING | Facility: CLINIC | Age: 64
End: 2024-11-29

## 2024-11-29 DIAGNOSIS — C18.9 MALIGNANT NEOPLASM OF COLON, UNSPECIFIED: ICD-10-CM

## 2024-11-29 DIAGNOSIS — Z98.890 OTHER SPECIFIED POSTPROCEDURAL STATES: Chronic | ICD-10-CM

## 2024-11-29 PROCEDURE — 74177 CT ABD & PELVIS W/CONTRAST: CPT | Mod: 26

## 2024-11-29 PROCEDURE — 71260 CT THORAX DX C+: CPT | Mod: 26

## 2024-11-29 PROCEDURE — 71260 CT THORAX DX C+: CPT

## 2024-11-29 PROCEDURE — 74177 CT ABD & PELVIS W/CONTRAST: CPT

## 2025-02-04 ENCOUNTER — APPOINTMENT (OUTPATIENT)
Dept: HEMATOLOGY ONCOLOGY | Facility: CLINIC | Age: 65
End: 2025-02-04

## 2025-02-05 ENCOUNTER — OUTPATIENT (OUTPATIENT)
Dept: OUTPATIENT SERVICES | Facility: HOSPITAL | Age: 65
LOS: 1 days | Discharge: HOME | End: 2025-02-05

## 2025-02-05 DIAGNOSIS — Z98.890 OTHER SPECIFIED POSTPROCEDURAL STATES: Chronic | ICD-10-CM

## 2025-02-05 DIAGNOSIS — C18.9 MALIGNANT NEOPLASM OF COLON, UNSPECIFIED: ICD-10-CM

## 2025-02-06 ENCOUNTER — NON-APPOINTMENT (OUTPATIENT)
Age: 65
End: 2025-02-06

## 2025-02-06 ENCOUNTER — APPOINTMENT (OUTPATIENT)
Dept: HEMATOLOGY ONCOLOGY | Facility: CLINIC | Age: 65
End: 2025-02-06
Payer: MEDICARE

## 2025-02-06 ENCOUNTER — RESULT REVIEW (OUTPATIENT)
Age: 65
End: 2025-02-06

## 2025-02-06 VITALS
RESPIRATION RATE: 17 BRPM | BODY MASS INDEX: 34.94 KG/M2 | DIASTOLIC BLOOD PRESSURE: 90 MMHG | OXYGEN SATURATION: 99 % | WEIGHT: 204.67 LBS | TEMPERATURE: 97.6 F | HEART RATE: 98 BPM | HEIGHT: 64 IN | SYSTOLIC BLOOD PRESSURE: 137 MMHG

## 2025-02-06 DIAGNOSIS — C18.9 MALIGNANT NEOPLASM OF COLON, UNSPECIFIED: ICD-10-CM

## 2025-02-06 DIAGNOSIS — C18.5 MALIGNANT NEOPLASM OF SPLENIC FLEXURE: ICD-10-CM

## 2025-02-06 LAB
BASOPHILS # BLD AUTO: 0.07 K/UL — SIGNIFICANT CHANGE UP (ref 0–0.2)
BASOPHILS NFR BLD AUTO: 0.9 % — SIGNIFICANT CHANGE UP (ref 0–2)
EOSINOPHIL # BLD AUTO: 0.13 K/UL — SIGNIFICANT CHANGE UP (ref 0–0.5)
EOSINOPHIL NFR BLD AUTO: 1.7 % — SIGNIFICANT CHANGE UP (ref 0–6)
HCT VFR BLD CALC: 43.2 % — SIGNIFICANT CHANGE UP (ref 39–50)
HGB BLD-MCNC: 14.2 G/DL — SIGNIFICANT CHANGE UP (ref 13–17)
IMM GRANULOCYTES NFR BLD AUTO: 0.4 % — SIGNIFICANT CHANGE UP (ref 0–0.9)
LYMPHOCYTES # BLD AUTO: 2 K/UL — SIGNIFICANT CHANGE UP (ref 1–3.3)
LYMPHOCYTES # BLD AUTO: 25.5 % — SIGNIFICANT CHANGE UP (ref 13–44)
MCHC RBC-ENTMCNC: 26.8 PG — LOW (ref 27–34)
MCHC RBC-ENTMCNC: 32.9 G/DL — SIGNIFICANT CHANGE UP (ref 32–36)
MCV RBC AUTO: 81.7 FL — SIGNIFICANT CHANGE UP (ref 80–100)
MONOCYTES # BLD AUTO: 0.54 K/UL — SIGNIFICANT CHANGE UP (ref 0–0.9)
MONOCYTES NFR BLD AUTO: 6.9 % — SIGNIFICANT CHANGE UP (ref 2–14)
NEUTROPHILS # BLD AUTO: 5.08 K/UL — SIGNIFICANT CHANGE UP (ref 1.8–7.4)
NEUTROPHILS NFR BLD AUTO: 64.6 % — SIGNIFICANT CHANGE UP (ref 43–77)
NRBC # BLD: 0 /100 WBCS — SIGNIFICANT CHANGE UP (ref 0–0)
NRBC BLD-RTO: 0 /100 WBCS — SIGNIFICANT CHANGE UP (ref 0–0)
PLATELET # BLD AUTO: 258 K/UL — SIGNIFICANT CHANGE UP (ref 150–400)
RBC # BLD: 5.29 M/UL — SIGNIFICANT CHANGE UP (ref 4.2–5.8)
RBC # FLD: 14.2 % — SIGNIFICANT CHANGE UP (ref 10.3–14.5)
WBC # BLD: 7.85 K/UL — SIGNIFICANT CHANGE UP (ref 3.8–10.5)
WBC # FLD AUTO: 7.85 K/UL — SIGNIFICANT CHANGE UP (ref 3.8–10.5)

## 2025-02-06 PROCEDURE — 99214 OFFICE O/P EST MOD 30 MIN: CPT

## 2025-02-07 LAB
ALBUMIN SERPL ELPH-MCNC: 4.4 G/DL
ALP BLD-CCNC: 87 U/L
ALT SERPL-CCNC: 21 U/L
ANION GAP SERPL CALC-SCNC: 13 MMOL/L
AST SERPL-CCNC: 21 U/L
BILIRUB SERPL-MCNC: 0.3 MG/DL
BUN SERPL-MCNC: 23 MG/DL
CALCIUM SERPL-MCNC: 10.2 MG/DL
CEA SERPL-MCNC: 1.3 NG/ML
CHLORIDE SERPL-SCNC: 101 MMOL/L
CO2 SERPL-SCNC: 28 MMOL/L
CREAT SERPL-MCNC: 1.56 MG/DL
EGFR: 50 ML/MIN/1.73M2
GLUCOSE SERPL-MCNC: 89 MG/DL
POTASSIUM SERPL-SCNC: 4 MMOL/L
PROT SERPL-MCNC: 6.7 G/DL
SODIUM SERPL-SCNC: 141 MMOL/L

## 2025-02-25 ENCOUNTER — APPOINTMENT (OUTPATIENT)
Dept: SURGICAL ONCOLOGY | Facility: CLINIC | Age: 65
End: 2025-02-25
Payer: MEDICARE

## 2025-02-25 VITALS
WEIGHT: 207 LBS | BODY MASS INDEX: 35.34 KG/M2 | OXYGEN SATURATION: 99 % | HEIGHT: 64 IN | SYSTOLIC BLOOD PRESSURE: 130 MMHG | HEART RATE: 89 BPM | DIASTOLIC BLOOD PRESSURE: 90 MMHG

## 2025-02-25 DIAGNOSIS — C18.5 MALIGNANT NEOPLASM OF SPLENIC FLEXURE: ICD-10-CM

## 2025-02-25 PROCEDURE — 99213 OFFICE O/P EST LOW 20 MIN: CPT

## 2025-03-06 ENCOUNTER — NON-APPOINTMENT (OUTPATIENT)
Age: 65
End: 2025-03-06

## 2025-05-03 ENCOUNTER — OUTPATIENT (OUTPATIENT)
Dept: OUTPATIENT SERVICES | Facility: HOSPITAL | Age: 65
LOS: 1 days | Discharge: HOME | End: 2025-05-03

## 2025-05-03 DIAGNOSIS — C18.9 MALIGNANT NEOPLASM OF COLON, UNSPECIFIED: ICD-10-CM

## 2025-05-03 DIAGNOSIS — Z98.890 OTHER SPECIFIED POSTPROCEDURAL STATES: Chronic | ICD-10-CM

## 2025-05-08 ENCOUNTER — APPOINTMENT (OUTPATIENT)
Dept: HEMATOLOGY ONCOLOGY | Facility: CLINIC | Age: 65
End: 2025-05-08
Payer: MEDICARE

## 2025-05-08 VITALS
RESPIRATION RATE: 16 BRPM | OXYGEN SATURATION: 98 % | DIASTOLIC BLOOD PRESSURE: 85 MMHG | TEMPERATURE: 97.3 F | HEART RATE: 77 BPM | WEIGHT: 202.82 LBS | SYSTOLIC BLOOD PRESSURE: 132 MMHG | BODY MASS INDEX: 34.81 KG/M2

## 2025-05-08 DIAGNOSIS — C18.9 MALIGNANT NEOPLASM OF COLON, UNSPECIFIED: ICD-10-CM

## 2025-05-08 PROCEDURE — 99213 OFFICE O/P EST LOW 20 MIN: CPT

## 2025-05-29 ENCOUNTER — APPOINTMENT (OUTPATIENT)
Dept: CT IMAGING | Facility: CLINIC | Age: 65
End: 2025-05-29
Payer: MEDICARE

## 2025-05-29 ENCOUNTER — OUTPATIENT (OUTPATIENT)
Dept: OUTPATIENT SERVICES | Facility: HOSPITAL | Age: 65
LOS: 1 days | End: 2025-05-29
Payer: MEDICARE

## 2025-05-29 DIAGNOSIS — C18.5 MALIGNANT NEOPLASM OF SPLENIC FLEXURE: ICD-10-CM

## 2025-05-29 DIAGNOSIS — Z98.890 OTHER SPECIFIED POSTPROCEDURAL STATES: Chronic | ICD-10-CM

## 2025-05-29 PROCEDURE — 71260 CT THORAX DX C+: CPT

## 2025-05-29 PROCEDURE — 74177 CT ABD & PELVIS W/CONTRAST: CPT

## 2025-05-29 PROCEDURE — 74177 CT ABD & PELVIS W/CONTRAST: CPT | Mod: 26

## 2025-05-29 PROCEDURE — 71260 CT THORAX DX C+: CPT | Mod: 26

## 2025-08-07 ENCOUNTER — APPOINTMENT (OUTPATIENT)
Dept: HEMATOLOGY ONCOLOGY | Facility: CLINIC | Age: 65
End: 2025-08-07

## 2025-08-25 ENCOUNTER — APPOINTMENT (OUTPATIENT)
Dept: SURGICAL ONCOLOGY | Facility: CLINIC | Age: 65
End: 2025-08-25
Payer: MEDICARE

## 2025-08-25 VITALS
SYSTOLIC BLOOD PRESSURE: 130 MMHG | HEIGHT: 64 IN | DIASTOLIC BLOOD PRESSURE: 77 MMHG | WEIGHT: 202 LBS | HEART RATE: 90 BPM | BODY MASS INDEX: 34.49 KG/M2 | OXYGEN SATURATION: 96 %

## 2025-08-25 DIAGNOSIS — C18.5 MALIGNANT NEOPLASM OF SPLENIC FLEXURE: ICD-10-CM

## 2025-08-25 PROCEDURE — 99213 OFFICE O/P EST LOW 20 MIN: CPT

## 2025-08-27 ENCOUNTER — RESULT REVIEW (OUTPATIENT)
Age: 65
End: 2025-08-27

## 2025-08-27 ENCOUNTER — APPOINTMENT (OUTPATIENT)
Dept: HEMATOLOGY ONCOLOGY | Facility: CLINIC | Age: 65
End: 2025-08-27
Payer: MEDICARE

## 2025-08-27 VITALS
HEART RATE: 83 BPM | TEMPERATURE: 97.3 F | RESPIRATION RATE: 16 BRPM | OXYGEN SATURATION: 98 % | WEIGHT: 204.22 LBS | DIASTOLIC BLOOD PRESSURE: 83 MMHG | BODY MASS INDEX: 34.86 KG/M2 | SYSTOLIC BLOOD PRESSURE: 145 MMHG | HEIGHT: 64 IN

## 2025-08-27 DIAGNOSIS — C18.9 MALIGNANT NEOPLASM OF COLON, UNSPECIFIED: ICD-10-CM

## 2025-08-27 PROCEDURE — 99213 OFFICE O/P EST LOW 20 MIN: CPT

## 2025-08-28 LAB
ALBUMIN SERPL ELPH-MCNC: 4.7 G/DL
ALP BLD-CCNC: 88 U/L
ALT SERPL-CCNC: 28 U/L
ANION GAP SERPL CALC-SCNC: 14 MMOL/L
AST SERPL-CCNC: 27 U/L
BILIRUB SERPL-MCNC: 0.4 MG/DL
BUN SERPL-MCNC: 19 MG/DL
CALCIUM SERPL-MCNC: 10 MG/DL
CEA SERPL-MCNC: 2 NG/ML
CHLORIDE SERPL-SCNC: 103 MMOL/L
CO2 SERPL-SCNC: 24 MMOL/L
CREAT SERPL-MCNC: 1.51 MG/DL
EGFRCR SERPLBLD CKD-EPI 2021: 52 ML/MIN/1.73M2
GLUCOSE SERPL-MCNC: 90 MG/DL
POTASSIUM SERPL-SCNC: 4 MMOL/L
PROT SERPL-MCNC: 7 G/DL
SODIUM SERPL-SCNC: 142 MMOL/L

## 2025-09-15 ENCOUNTER — NON-APPOINTMENT (OUTPATIENT)
Age: 65
End: 2025-09-15

## (undated) DEVICE — GOWN XL

## (undated) DEVICE — POSITIONER FOAM EGG CRATE ULNAR 2PCS (PINK)

## (undated) DEVICE — XI ARM CLIP APPLIER MEDIUM-LARGE

## (undated) DEVICE — XI ARM NEEDLE DRIVER SUTURECUT MEGA 8MM

## (undated) DEVICE — XI ARM SCISSOR MONO CURVED

## (undated) DEVICE — BLADE SCALPEL SAFETYLOCK #10

## (undated) DEVICE — XI VESSEL SEALER

## (undated) DEVICE — POSITIONER PURPLE ARM ONE STEP (LARGE)

## (undated) DEVICE — WARMING BLANKET FULL ADULT

## (undated) DEVICE — STAPLER COVIDIEN ENDO GIA STANDARD HANDLE

## (undated) DEVICE — TUBING STRYKEFLOW II SUCTION / IRRIGATOR

## (undated) DEVICE — VENODYNE/SCD SLEEVE CALF MEDIUM

## (undated) DEVICE — ELCTR BOVIE PENCIL SMOKE EVACUATION

## (undated) DEVICE — TUBING AIRSEAL TRI-LUMEN FILTERED

## (undated) DEVICE — XI ARM FORCEP FENESTRATED BIPOLAR 8MM

## (undated) DEVICE — DRSG AQUACEL 3.5 X 4"

## (undated) DEVICE — PACK ROBOTIC LIJ

## (undated) DEVICE — FOLEY TRAY 16FR 5CC LF UMETER CLOSED

## (undated) DEVICE — RETRACTOR LAPAROSCOPIC ALEXIS MEDIUM

## (undated) DEVICE — XI OBTURATOR OPTICAL BLADELESS 8MM

## (undated) DEVICE — POSITIONER PINK PAD PIGAZZI SYSTEM

## (undated) DEVICE — XI ARM PERMANENT CAUTERY SPATULA

## (undated) DEVICE — XI DRAPE COLUMN

## (undated) DEVICE — XI ARM FORCEP MARYLAND BIPOLAR

## (undated) DEVICE — ELCTR BOVIE TIP BLADE INSULATED 6.5" EDGE

## (undated) DEVICE — XI ARM CLIP APPLIER LARGE

## (undated) DEVICE — XI SHEARS HARMONIC CVD 8MM

## (undated) DEVICE — XI ARM PERMANENT CAUTERY HOOK

## (undated) DEVICE — XI ARM NEEDLE DRIVER LARGE

## (undated) DEVICE — ELCTR GROUNDING PAD ADULT COVIDIEN

## (undated) DEVICE — XI ARM FORCEP TENACULUM

## (undated) DEVICE — XI DRAPE ARM

## (undated) DEVICE — ELCTR BOVIE TIP BLADE INSULATED 2.75" EDGE WITH SAFETY

## (undated) DEVICE — TROCAR SURGIQUEST AIRSEAL 12MMX100MM

## (undated) DEVICE — XI SEAL UNIVERSIAL 5-12MM

## (undated) DEVICE — XI ARM GRASPER TIP UP FENESTRATED

## (undated) DEVICE — XI TIP COVER

## (undated) DEVICE — POSITIONER STRAP ARMBOARD VELCRO TS-30

## (undated) DEVICE — GOWN XXXL

## (undated) DEVICE — XI ARM DISSECTOR CURVED BIPOLAR 8MM

## (undated) DEVICE — XI ARM FORCEP PROGRASP 8MM

## (undated) DEVICE — DRSG TELFA 3 X 8

## (undated) DEVICE — POSITIONER FOAM HEAD CRADLE (PINK)